# Patient Record
Sex: MALE | Race: WHITE | Employment: OTHER
[De-identification: names, ages, dates, MRNs, and addresses within clinical notes are randomized per-mention and may not be internally consistent; named-entity substitution may affect disease eponyms.]

---

## 2019-01-01 ENCOUNTER — HOME CARE VISIT (OUTPATIENT)
Dept: SCHEDULING | Facility: HOME HEALTH | Age: 69
End: 2019-01-01
Payer: MEDICARE

## 2019-01-01 ENCOUNTER — APPOINTMENT (OUTPATIENT)
Dept: GENERAL RADIOLOGY | Age: 69
DRG: 603 | End: 2019-01-01
Attending: INTERNAL MEDICINE
Payer: MEDICARE

## 2019-01-01 ENCOUNTER — HOME CARE VISIT (OUTPATIENT)
Dept: HOSPICE | Facility: HOSPICE | Age: 69
End: 2019-01-01
Payer: MEDICARE

## 2019-01-01 ENCOUNTER — HOSPITAL ENCOUNTER (INPATIENT)
Age: 69
End: 2019-01-01
Attending: INTERNAL MEDICINE | Admitting: INTERNAL MEDICINE
Payer: MEDICARE

## 2019-01-01 ENCOUNTER — APPOINTMENT (OUTPATIENT)
Dept: CT IMAGING | Age: 69
DRG: 603 | End: 2019-01-01
Attending: EMERGENCY MEDICINE
Payer: MEDICARE

## 2019-01-01 ENCOUNTER — APPOINTMENT (OUTPATIENT)
Dept: MRI IMAGING | Age: 69
DRG: 603 | End: 2019-01-01
Attending: INTERNAL MEDICINE
Payer: MEDICARE

## 2019-01-01 ENCOUNTER — APPOINTMENT (OUTPATIENT)
Dept: GENERAL RADIOLOGY | Age: 69
DRG: 603 | End: 2019-01-01
Attending: EMERGENCY MEDICINE
Payer: MEDICARE

## 2019-01-01 ENCOUNTER — HOSPICE ADMISSION (OUTPATIENT)
Dept: HOSPICE | Facility: HOSPICE | Age: 69
End: 2019-01-01
Payer: MEDICARE

## 2019-01-01 ENCOUNTER — HOSPITAL ENCOUNTER (INPATIENT)
Age: 69
LOS: 5 days | Discharge: HOSPICE | DRG: 603 | End: 2019-09-30
Attending: EMERGENCY MEDICINE | Admitting: INTERNAL MEDICINE
Payer: MEDICARE

## 2019-01-01 ENCOUNTER — HOSPITAL ENCOUNTER (INPATIENT)
Age: 69
LOS: 7 days | Discharge: HOME | End: 2019-10-07
Attending: INTERNAL MEDICINE | Admitting: INTERNAL MEDICINE
Payer: MEDICARE

## 2019-01-01 VITALS
SYSTOLIC BLOOD PRESSURE: 122 MMHG | HEART RATE: 72 BPM | RESPIRATION RATE: 22 BRPM | TEMPERATURE: 98.2 F | DIASTOLIC BLOOD PRESSURE: 72 MMHG

## 2019-01-01 VITALS
HEART RATE: 80 BPM | SYSTOLIC BLOOD PRESSURE: 160 MMHG | TEMPERATURE: 98.4 F | RESPIRATION RATE: 32 BRPM | DIASTOLIC BLOOD PRESSURE: 90 MMHG

## 2019-01-01 VITALS
TEMPERATURE: 98.4 F | HEART RATE: 88 BPM | DIASTOLIC BLOOD PRESSURE: 80 MMHG | RESPIRATION RATE: 20 BRPM | SYSTOLIC BLOOD PRESSURE: 128 MMHG

## 2019-01-01 VITALS
HEART RATE: 92 BPM | TEMPERATURE: 98.9 F | RESPIRATION RATE: 24 BRPM | SYSTOLIC BLOOD PRESSURE: 160 MMHG | DIASTOLIC BLOOD PRESSURE: 90 MMHG

## 2019-01-01 VITALS
DIASTOLIC BLOOD PRESSURE: 80 MMHG | TEMPERATURE: 98.6 F | SYSTOLIC BLOOD PRESSURE: 130 MMHG | HEART RATE: 72 BPM | RESPIRATION RATE: 22 BRPM

## 2019-01-01 VITALS
HEART RATE: 96 BPM | RESPIRATION RATE: 24 BRPM | SYSTOLIC BLOOD PRESSURE: 120 MMHG | DIASTOLIC BLOOD PRESSURE: 70 MMHG | TEMPERATURE: 98.6 F

## 2019-01-01 VITALS
HEART RATE: 72 BPM | DIASTOLIC BLOOD PRESSURE: 80 MMHG | SYSTOLIC BLOOD PRESSURE: 130 MMHG | TEMPERATURE: 97.6 F | RESPIRATION RATE: 24 BRPM

## 2019-01-01 VITALS
HEART RATE: 68 BPM | SYSTOLIC BLOOD PRESSURE: 138 MMHG | DIASTOLIC BLOOD PRESSURE: 80 MMHG | OXYGEN SATURATION: 98 % | RESPIRATION RATE: 20 BRPM | TEMPERATURE: 97.6 F

## 2019-01-01 VITALS
TEMPERATURE: 98.4 F | SYSTOLIC BLOOD PRESSURE: 136 MMHG | RESPIRATION RATE: 22 BRPM | DIASTOLIC BLOOD PRESSURE: 76 MMHG | HEART RATE: 92 BPM

## 2019-01-01 VITALS
HEART RATE: 84 BPM | DIASTOLIC BLOOD PRESSURE: 80 MMHG | TEMPERATURE: 98.1 F | SYSTOLIC BLOOD PRESSURE: 120 MMHG | RESPIRATION RATE: 20 BRPM

## 2019-01-01 VITALS
DIASTOLIC BLOOD PRESSURE: 80 MMHG | SYSTOLIC BLOOD PRESSURE: 122 MMHG | HEART RATE: 108 BPM | RESPIRATION RATE: 20 BRPM | TEMPERATURE: 98.6 F

## 2019-01-01 VITALS
RESPIRATION RATE: 18 BRPM | SYSTOLIC BLOOD PRESSURE: 135 MMHG | TEMPERATURE: 96.5 F | HEART RATE: 90 BPM | DIASTOLIC BLOOD PRESSURE: 74 MMHG

## 2019-01-01 VITALS
SYSTOLIC BLOOD PRESSURE: 150 MMHG | TEMPERATURE: 46.9 F | RESPIRATION RATE: 32 BRPM | DIASTOLIC BLOOD PRESSURE: 90 MMHG | HEART RATE: 92 BPM

## 2019-01-01 VITALS
RESPIRATION RATE: 28 BRPM | DIASTOLIC BLOOD PRESSURE: 80 MMHG | SYSTOLIC BLOOD PRESSURE: 140 MMHG | HEART RATE: 92 BPM | TEMPERATURE: 97.9 F

## 2019-01-01 VITALS
SYSTOLIC BLOOD PRESSURE: 145 MMHG | TEMPERATURE: 98.1 F | HEART RATE: 65 BPM | RESPIRATION RATE: 16 BRPM | DIASTOLIC BLOOD PRESSURE: 90 MMHG

## 2019-01-01 VITALS
DIASTOLIC BLOOD PRESSURE: 86 MMHG | RESPIRATION RATE: 28 BRPM | SYSTOLIC BLOOD PRESSURE: 140 MMHG | HEART RATE: 100 BPM | TEMPERATURE: 98 F

## 2019-01-01 VITALS
HEART RATE: 76 BPM | HEIGHT: 68 IN | BODY MASS INDEX: 24.71 KG/M2 | DIASTOLIC BLOOD PRESSURE: 76 MMHG | OXYGEN SATURATION: 98 % | WEIGHT: 163 LBS | TEMPERATURE: 97.6 F | RESPIRATION RATE: 16 BRPM | SYSTOLIC BLOOD PRESSURE: 116 MMHG

## 2019-01-01 VITALS
RESPIRATION RATE: 20 BRPM | BODY MASS INDEX: 24.25 KG/M2 | SYSTOLIC BLOOD PRESSURE: 141 MMHG | DIASTOLIC BLOOD PRESSURE: 80 MMHG | HEIGHT: 68 IN | TEMPERATURE: 96 F | WEIGHT: 160 LBS | OXYGEN SATURATION: 98 % | HEART RATE: 78 BPM

## 2019-01-01 VITALS
TEMPERATURE: 97.7 F | DIASTOLIC BLOOD PRESSURE: 76 MMHG | SYSTOLIC BLOOD PRESSURE: 138 MMHG | HEART RATE: 100 BPM | RESPIRATION RATE: 24 BRPM

## 2019-01-01 VITALS
SYSTOLIC BLOOD PRESSURE: 94 MMHG | HEART RATE: 90 BPM | RESPIRATION RATE: 26 BRPM | DIASTOLIC BLOOD PRESSURE: 64 MMHG | TEMPERATURE: 98.6 F

## 2019-01-01 VITALS
SYSTOLIC BLOOD PRESSURE: 110 MMHG | TEMPERATURE: 98.3 F | HEART RATE: 84 BPM | RESPIRATION RATE: 20 BRPM | DIASTOLIC BLOOD PRESSURE: 60 MMHG

## 2019-01-01 VITALS
TEMPERATURE: 98.5 F | DIASTOLIC BLOOD PRESSURE: 60 MMHG | RESPIRATION RATE: 20 BRPM | HEART RATE: 76 BPM | SYSTOLIC BLOOD PRESSURE: 80 MMHG

## 2019-01-01 VITALS
HEART RATE: 108 BPM | SYSTOLIC BLOOD PRESSURE: 140 MMHG | TEMPERATURE: 98.4 F | RESPIRATION RATE: 36 BRPM | DIASTOLIC BLOOD PRESSURE: 80 MMHG

## 2019-01-01 VITALS
TEMPERATURE: 98.6 F | RESPIRATION RATE: 28 BRPM | HEART RATE: 96 BPM | DIASTOLIC BLOOD PRESSURE: 80 MMHG | SYSTOLIC BLOOD PRESSURE: 140 MMHG

## 2019-01-01 VITALS
SYSTOLIC BLOOD PRESSURE: 120 MMHG | DIASTOLIC BLOOD PRESSURE: 70 MMHG | HEART RATE: 60 BPM | TEMPERATURE: 98.2 F | RESPIRATION RATE: 20 BRPM

## 2019-01-01 VITALS
SYSTOLIC BLOOD PRESSURE: 134 MMHG | DIASTOLIC BLOOD PRESSURE: 80 MMHG | TEMPERATURE: 98.4 F | HEART RATE: 84 BPM | RESPIRATION RATE: 32 BRPM

## 2019-01-01 VITALS
HEART RATE: 96 BPM | SYSTOLIC BLOOD PRESSURE: 155 MMHG | RESPIRATION RATE: 22 BRPM | DIASTOLIC BLOOD PRESSURE: 113 MMHG | TEMPERATURE: 98.9 F

## 2019-01-01 VITALS
TEMPERATURE: 98.2 F | SYSTOLIC BLOOD PRESSURE: 130 MMHG | HEART RATE: 86 BPM | DIASTOLIC BLOOD PRESSURE: 85 MMHG | RESPIRATION RATE: 20 BRPM

## 2019-01-01 VITALS
TEMPERATURE: 98.2 F | SYSTOLIC BLOOD PRESSURE: 110 MMHG | RESPIRATION RATE: 32 BRPM | DIASTOLIC BLOOD PRESSURE: 80 MMHG | HEART RATE: 92 BPM

## 2019-01-01 VITALS
SYSTOLIC BLOOD PRESSURE: 124 MMHG | DIASTOLIC BLOOD PRESSURE: 80 MMHG | HEART RATE: 84 BPM | TEMPERATURE: 98.2 F | RESPIRATION RATE: 26 BRPM

## 2019-01-01 DIAGNOSIS — L03.115 CELLULITIS OF RIGHT LEG: ICD-10-CM

## 2019-01-01 DIAGNOSIS — L03.119 CELLULITIS AND ABSCESS OF LEG, EXCEPT FOOT: Primary | ICD-10-CM

## 2019-01-01 DIAGNOSIS — N31.9 NEUROGENIC BLADDER DISORDER: ICD-10-CM

## 2019-01-01 DIAGNOSIS — R53.81 DEBILITY: ICD-10-CM

## 2019-01-01 DIAGNOSIS — F13.20 BENZODIAZEPINE DEPENDENCE (HCC): Primary | ICD-10-CM

## 2019-01-01 DIAGNOSIS — J96.22 ACUTE ON CHRONIC RESPIRATORY FAILURE WITH HYPERCAPNIA (HCC): ICD-10-CM

## 2019-01-01 DIAGNOSIS — J96.11 CHRONIC RESPIRATORY FAILURE WITH HYPOXIA (HCC): ICD-10-CM

## 2019-01-01 DIAGNOSIS — J43.1 PANLOBULAR EMPHYSEMA (HCC): ICD-10-CM

## 2019-01-01 DIAGNOSIS — L02.419 CELLULITIS AND ABSCESS OF LEG, EXCEPT FOOT: Primary | ICD-10-CM

## 2019-01-01 LAB
ACC. NO. FROM MICRO ORDER, ACCP: ABNORMAL
ACC. NO. FROM MICRO ORDER, ACCP: ABNORMAL
ALBUMIN SERPL-MCNC: 3 G/DL (ref 3.2–4.6)
ALBUMIN/GLOB SERPL: 0.6 {RATIO} (ref 1.2–3.5)
ALP SERPL-CCNC: 114 U/L (ref 50–136)
ALT SERPL-CCNC: 18 U/L (ref 12–65)
ANION GAP SERPL CALC-SCNC: 0 MMOL/L (ref 7–16)
ANION GAP SERPL CALC-SCNC: 2 MMOL/L (ref 7–16)
ANION GAP SERPL CALC-SCNC: 3 MMOL/L (ref 7–16)
ANION GAP SERPL CALC-SCNC: 3 MMOL/L (ref 7–16)
ANION GAP SERPL CALC-SCNC: 4 MMOL/L (ref 7–16)
ANION GAP SERPL CALC-SCNC: ABNORMAL MMOL/L (ref 7–16)
APPEARANCE UR: ABNORMAL
ARTERIAL PATENCY WRIST A: YES
AST SERPL-CCNC: 20 U/L (ref 15–37)
BACTERIA SPEC CULT: ABNORMAL
BACTERIA SPEC CULT: NORMAL
BACTERIA SPEC CULT: NORMAL
BACTERIA URNS QL MICRO: ABNORMAL /HPF
BASE EXCESS BLD CALC-SCNC: 7 MMOL/L
BASOPHILS # BLD: 0 K/UL (ref 0–0.2)
BASOPHILS NFR BLD: 0 % (ref 0–2)
BDY SITE: ABNORMAL
BILIRUB SERPL-MCNC: 0.4 MG/DL (ref 0.2–1.1)
BILIRUB UR QL: NEGATIVE
BODY TEMPERATURE: 98.6
BUN SERPL-MCNC: 15 MG/DL (ref 8–23)
BUN SERPL-MCNC: 17 MG/DL (ref 8–23)
BUN SERPL-MCNC: 18 MG/DL (ref 8–23)
BUN SERPL-MCNC: 23 MG/DL (ref 8–23)
BUN SERPL-MCNC: 24 MG/DL (ref 8–23)
BUN SERPL-MCNC: 25 MG/DL (ref 8–23)
CALCIUM SERPL-MCNC: 8.9 MG/DL (ref 8.3–10.4)
CALCIUM SERPL-MCNC: 8.9 MG/DL (ref 8.3–10.4)
CALCIUM SERPL-MCNC: 9.1 MG/DL (ref 8.3–10.4)
CALCIUM SERPL-MCNC: 9.1 MG/DL (ref 8.3–10.4)
CALCIUM SERPL-MCNC: 9.2 MG/DL (ref 8.3–10.4)
CALCIUM SERPL-MCNC: 9.6 MG/DL (ref 8.3–10.4)
CASTS URNS QL MICRO: ABNORMAL /LPF
CHLORIDE SERPL-SCNC: 101 MMOL/L (ref 98–107)
CHLORIDE SERPL-SCNC: 102 MMOL/L (ref 98–107)
CHLORIDE SERPL-SCNC: 105 MMOL/L (ref 98–107)
CHLORIDE SERPL-SCNC: 105 MMOL/L (ref 98–107)
CHLORIDE SERPL-SCNC: 107 MMOL/L (ref 98–107)
CHLORIDE SERPL-SCNC: 99 MMOL/L (ref 98–107)
CO2 BLD-SCNC: 37 MMOL/L
CO2 SERPL-SCNC: 35 MMOL/L (ref 21–32)
CO2 SERPL-SCNC: 36 MMOL/L (ref 21–32)
CO2 SERPL-SCNC: 37 MMOL/L (ref 21–32)
CO2 SERPL-SCNC: 38 MMOL/L (ref 21–32)
COLLECT TIME,HTIME: 1530
COLOR UR: YELLOW
CREAT SERPL-MCNC: 0.84 MG/DL (ref 0.8–1.5)
CREAT SERPL-MCNC: 0.84 MG/DL (ref 0.8–1.5)
CREAT SERPL-MCNC: 1 MG/DL (ref 0.8–1.5)
CREAT SERPL-MCNC: 1.02 MG/DL (ref 0.8–1.5)
CREAT SERPL-MCNC: 1.15 MG/DL (ref 0.8–1.5)
CREAT SERPL-MCNC: 1.17 MG/DL (ref 0.8–1.5)
DIFFERENTIAL METHOD BLD: ABNORMAL
EOSINOPHIL # BLD: 0 K/UL (ref 0–0.8)
EOSINOPHIL # BLD: 0.1 K/UL (ref 0–0.8)
EOSINOPHIL NFR BLD: 0 % (ref 0.5–7.8)
EOSINOPHIL NFR BLD: 1 % (ref 0.5–7.8)
EPI CELLS #/AREA URNS HPF: 0 /HPF
ERYTHROCYTE [DISTWIDTH] IN BLOOD BY AUTOMATED COUNT: 13.8 % (ref 11.9–14.6)
ERYTHROCYTE [DISTWIDTH] IN BLOOD BY AUTOMATED COUNT: 13.9 % (ref 11.9–14.6)
ERYTHROCYTE [DISTWIDTH] IN BLOOD BY AUTOMATED COUNT: 14 % (ref 11.9–14.6)
ERYTHROCYTE [DISTWIDTH] IN BLOOD BY AUTOMATED COUNT: 14.1 % (ref 11.9–14.6)
ERYTHROCYTE [DISTWIDTH] IN BLOOD BY AUTOMATED COUNT: 14.3 % (ref 11.9–14.6)
ERYTHROCYTE [DISTWIDTH] IN BLOOD BY AUTOMATED COUNT: 14.3 % (ref 11.9–14.6)
ESCHERICHIA COLI: DETECTED
ESCHERICHIA COLI: DETECTED
FLOW RATE ISTAT,IFRATE: 3.5 L/MIN
GAS FLOW.O2 O2 DELIVERY SYS: ABNORMAL L/MIN
GLOBULIN SER CALC-MCNC: 4.9 G/DL (ref 2.3–3.5)
GLUCOSE SERPL-MCNC: 113 MG/DL (ref 65–100)
GLUCOSE SERPL-MCNC: 132 MG/DL (ref 65–100)
GLUCOSE SERPL-MCNC: 132 MG/DL (ref 65–100)
GLUCOSE SERPL-MCNC: 141 MG/DL (ref 65–100)
GLUCOSE SERPL-MCNC: 88 MG/DL (ref 65–100)
GLUCOSE SERPL-MCNC: 95 MG/DL (ref 65–100)
GLUCOSE UR STRIP.AUTO-MCNC: NEGATIVE MG/DL
GRAM STN SPEC: ABNORMAL
HCO3 BLD-SCNC: 35.1 MMOL/L (ref 22–26)
HCT VFR BLD AUTO: 36.3 % (ref 41.1–50.3)
HCT VFR BLD AUTO: 38.8 % (ref 41.1–50.3)
HCT VFR BLD AUTO: 39.1 % (ref 41.1–50.3)
HCT VFR BLD AUTO: 39.7 % (ref 41.1–50.3)
HCT VFR BLD AUTO: 40.1 % (ref 41.1–50.3)
HCT VFR BLD AUTO: 44.2 % (ref 41.1–50.3)
HGB BLD-MCNC: 10.7 G/DL (ref 13.6–17.2)
HGB BLD-MCNC: 11.4 G/DL (ref 13.6–17.2)
HGB BLD-MCNC: 11.5 G/DL (ref 13.6–17.2)
HGB BLD-MCNC: 13.3 G/DL (ref 13.6–17.2)
HGB UR QL STRIP: ABNORMAL
IMM GRANULOCYTES # BLD AUTO: 0.1 K/UL (ref 0–0.5)
IMM GRANULOCYTES # BLD AUTO: 0.2 K/UL (ref 0–0.5)
IMM GRANULOCYTES # BLD AUTO: 0.2 K/UL (ref 0–0.5)
IMM GRANULOCYTES NFR BLD AUTO: 1 % (ref 0–5)
INTERPRETATION: ABNORMAL
INTERPRETATION: ABNORMAL
KETONES UR QL STRIP.AUTO: NEGATIVE MG/DL
KPC (CARBAPENEM RESISTANCE GENE): NOT DETECTED
KPC (CARBAPENEM RESISTANCE GENE): NOT DETECTED
LACTATE BLD-SCNC: 0.99 MMOL/L (ref 0.5–1.9)
LEUKOCYTE ESTERASE UR QL STRIP.AUTO: ABNORMAL
LYMPHOCYTES # BLD: 0.3 K/UL (ref 0.5–4.6)
LYMPHOCYTES # BLD: 0.6 K/UL (ref 0.5–4.6)
LYMPHOCYTES # BLD: 0.7 K/UL (ref 0.5–4.6)
LYMPHOCYTES # BLD: 0.7 K/UL (ref 0.5–4.6)
LYMPHOCYTES # BLD: 0.8 K/UL (ref 0.5–4.6)
LYMPHOCYTES # BLD: 0.9 K/UL (ref 0.5–4.6)
LYMPHOCYTES NFR BLD: 2 % (ref 13–44)
LYMPHOCYTES NFR BLD: 4 % (ref 13–44)
LYMPHOCYTES NFR BLD: 5 % (ref 13–44)
LYMPHOCYTES NFR BLD: 5 % (ref 13–44)
LYMPHOCYTES NFR BLD: 7 % (ref 13–44)
LYMPHOCYTES NFR BLD: 9 % (ref 13–44)
MCH RBC QN AUTO: 28.2 PG (ref 26.1–32.9)
MCH RBC QN AUTO: 28.4 PG (ref 26.1–32.9)
MCH RBC QN AUTO: 28.5 PG (ref 26.1–32.9)
MCH RBC QN AUTO: 28.6 PG (ref 26.1–32.9)
MCH RBC QN AUTO: 28.6 PG (ref 26.1–32.9)
MCH RBC QN AUTO: 28.8 PG (ref 26.1–32.9)
MCHC RBC AUTO-ENTMCNC: 28.7 G/DL (ref 31.4–35)
MCHC RBC AUTO-ENTMCNC: 29 G/DL (ref 31.4–35)
MCHC RBC AUTO-ENTMCNC: 29.2 G/DL (ref 31.4–35)
MCHC RBC AUTO-ENTMCNC: 29.5 G/DL (ref 31.4–35)
MCHC RBC AUTO-ENTMCNC: 29.6 G/DL (ref 31.4–35)
MCHC RBC AUTO-ENTMCNC: 30.1 G/DL (ref 31.4–35)
MCV RBC AUTO: 95.1 FL (ref 79.6–97.8)
MCV RBC AUTO: 96.5 FL (ref 79.6–97.8)
MCV RBC AUTO: 96.8 FL (ref 79.6–97.8)
MCV RBC AUTO: 97.5 FL (ref 79.6–97.8)
MCV RBC AUTO: 98.3 FL (ref 79.6–97.8)
MCV RBC AUTO: 99.3 FL (ref 79.6–97.8)
MM INDURATION POC: 0 MM (ref 0–5)
MONOCYTES # BLD: 0.6 K/UL (ref 0.1–1.3)
MONOCYTES # BLD: 0.7 K/UL (ref 0.1–1.3)
MONOCYTES # BLD: 0.8 K/UL (ref 0.1–1.3)
MONOCYTES # BLD: 0.9 K/UL (ref 0.1–1.3)
MONOCYTES # BLD: 0.9 K/UL (ref 0.1–1.3)
MONOCYTES # BLD: 1.2 K/UL (ref 0.1–1.3)
MONOCYTES NFR BLD: 4 % (ref 4–12)
MONOCYTES NFR BLD: 5 % (ref 4–12)
MONOCYTES NFR BLD: 6 % (ref 4–12)
MONOCYTES NFR BLD: 7 % (ref 4–12)
MONOCYTES NFR BLD: 7 % (ref 4–12)
MONOCYTES NFR BLD: 8 % (ref 4–12)
NEUTS SEG # BLD: 10.4 K/UL (ref 1.7–8.2)
NEUTS SEG # BLD: 11.7 K/UL (ref 1.7–8.2)
NEUTS SEG # BLD: 11.9 K/UL (ref 1.7–8.2)
NEUTS SEG # BLD: 13.4 K/UL (ref 1.7–8.2)
NEUTS SEG # BLD: 17 K/UL (ref 1.7–8.2)
NEUTS SEG # BLD: 8.1 K/UL (ref 1.7–8.2)
NEUTS SEG NFR BLD: 83 % (ref 43–78)
NEUTS SEG NFR BLD: 85 % (ref 43–78)
NEUTS SEG NFR BLD: 87 % (ref 43–78)
NEUTS SEG NFR BLD: 87 % (ref 43–78)
NEUTS SEG NFR BLD: 88 % (ref 43–78)
NEUTS SEG NFR BLD: 93 % (ref 43–78)
NITRITE UR QL STRIP.AUTO: POSITIVE
NRBC # BLD: 0 K/UL (ref 0–0.2)
PCO2 BLD: 64.3 MMHG (ref 35–45)
PH BLD: 7.34 [PH] (ref 7.35–7.45)
PH UR STRIP: 5 [PH] (ref 5–9)
PLATELET # BLD AUTO: 273 K/UL (ref 150–450)
PLATELET # BLD AUTO: 291 K/UL (ref 150–450)
PLATELET # BLD AUTO: 302 K/UL (ref 150–450)
PLATELET # BLD AUTO: 314 K/UL (ref 150–450)
PLATELET # BLD AUTO: 330 K/UL (ref 150–450)
PLATELET # BLD AUTO: 374 K/UL (ref 150–450)
PMV BLD AUTO: 10 FL (ref 9.4–12.3)
PMV BLD AUTO: 9.9 FL (ref 9.4–12.3)
PMV BLD AUTO: 9.9 FL (ref 9.4–12.3)
PO2 BLD: 98 MMHG (ref 75–100)
POTASSIUM SERPL-SCNC: 3.5 MMOL/L (ref 3.5–5.1)
POTASSIUM SERPL-SCNC: 3.8 MMOL/L (ref 3.5–5.1)
POTASSIUM SERPL-SCNC: 4 MMOL/L (ref 3.5–5.1)
POTASSIUM SERPL-SCNC: 4 MMOL/L (ref 3.5–5.1)
POTASSIUM SERPL-SCNC: 4.1 MMOL/L (ref 3.5–5.1)
POTASSIUM SERPL-SCNC: 4.4 MMOL/L (ref 3.5–5.1)
PPD POC: NEGATIVE NEGATIVE
PROT SERPL-MCNC: 7.9 G/DL (ref 6.3–8.2)
PROT UR STRIP-MCNC: ABNORMAL MG/DL
RBC # BLD AUTO: 3.75 M/UL (ref 4.23–5.6)
RBC # BLD AUTO: 4 M/UL (ref 4.23–5.6)
RBC # BLD AUTO: 4.01 M/UL (ref 4.23–5.6)
RBC # BLD AUTO: 4.02 M/UL (ref 4.23–5.6)
RBC # BLD AUTO: 4.08 M/UL (ref 4.23–5.6)
RBC # BLD AUTO: 4.65 M/UL (ref 4.23–5.6)
RBC #/AREA URNS HPF: ABNORMAL /HPF
SAO2 % BLD: 97 % (ref 95–98)
SERVICE CMNT-IMP: ABNORMAL
SERVICE CMNT-IMP: NORMAL
SERVICE CMNT-IMP: NORMAL
SODIUM SERPL-SCNC: 137 MMOL/L (ref 136–145)
SODIUM SERPL-SCNC: 139 MMOL/L (ref 136–145)
SODIUM SERPL-SCNC: 139 MMOL/L (ref 136–145)
SODIUM SERPL-SCNC: 141 MMOL/L (ref 136–145)
SODIUM SERPL-SCNC: 145 MMOL/L (ref 136–145)
SODIUM SERPL-SCNC: 147 MMOL/L (ref 136–145)
SP GR UR REFRACTOMETRY: 1.03 (ref 1–1.02)
SPECIMEN TYPE: ABNORMAL
UROBILINOGEN UR QL STRIP.AUTO: 0.2 EU/DL (ref 0.2–1)
VANCOMYCIN TROUGH SERPL-MCNC: 12.8 UG/ML (ref 5–20)
VANCOMYCIN TROUGH SERPL-MCNC: 16.4 UG/ML (ref 5–20)
WBC # BLD AUTO: 12.3 K/UL (ref 4.3–11.1)
WBC # BLD AUTO: 13.3 K/UL (ref 4.3–11.1)
WBC # BLD AUTO: 13.7 K/UL (ref 4.3–11.1)
WBC # BLD AUTO: 15.3 K/UL (ref 4.3–11.1)
WBC # BLD AUTO: 18.2 K/UL (ref 4.3–11.1)
WBC # BLD AUTO: 9.7 K/UL (ref 4.3–11.1)
WBC URNS QL MICRO: ABNORMAL /HPF

## 2019-01-01 PROCEDURE — 74011000250 HC RX REV CODE- 250: Performed by: NURSE PRACTITIONER

## 2019-01-01 PROCEDURE — G0156 HHCP-SVS OF AIDE,EA 15 MIN: HCPCS

## 2019-01-01 PROCEDURE — 77010033678 HC OXYGEN DAILY

## 2019-01-01 PROCEDURE — 74011636637 HC RX REV CODE- 636/637: Performed by: INTERNAL MEDICINE

## 2019-01-01 PROCEDURE — 65270000029 HC RM PRIVATE

## 2019-01-01 PROCEDURE — 0651 HSPC ROUTINE HOME CARE

## 2019-01-01 PROCEDURE — HOSPICE MEDICATION HC HH HOSPICE MEDICATION

## 2019-01-01 PROCEDURE — G0299 HHS/HOSPICE OF RN EA 15 MIN: HCPCS

## 2019-01-01 PROCEDURE — 85025 COMPLETE CBC W/AUTO DIFF WBC: CPT

## 2019-01-01 PROCEDURE — T4527 ADULT SIZE PULL-ON LG: HCPCS

## 2019-01-01 PROCEDURE — 74011250637 HC RX REV CODE- 250/637: Performed by: FAMILY MEDICINE

## 2019-01-01 PROCEDURE — 36415 COLL VENOUS BLD VENIPUNCTURE: CPT

## 2019-01-01 PROCEDURE — 74011250636 HC RX REV CODE- 250/636: Performed by: INTERNAL MEDICINE

## 2019-01-01 PROCEDURE — 77010033711 HC HIGH FLOW OXYGEN

## 2019-01-01 PROCEDURE — 80048 BASIC METABOLIC PNL TOTAL CA: CPT

## 2019-01-01 PROCEDURE — 74011000258 HC RX REV CODE- 258: Performed by: INTERNAL MEDICINE

## 2019-01-01 PROCEDURE — 74011250637 HC RX REV CODE- 250/637: Performed by: INTERNAL MEDICINE

## 2019-01-01 PROCEDURE — 3336590001 HSPC ROOM AND BOARD

## 2019-01-01 PROCEDURE — 87150 DNA/RNA AMPLIFIED PROBE: CPT

## 2019-01-01 PROCEDURE — 83605 ASSAY OF LACTIC ACID: CPT

## 2019-01-01 PROCEDURE — 74011250637 HC RX REV CODE- 250/637: Performed by: NURSE PRACTITIONER

## 2019-01-01 PROCEDURE — 74011000250 HC RX REV CODE- 250: Performed by: INTERNAL MEDICINE

## 2019-01-01 PROCEDURE — 74011250636 HC RX REV CODE- 250/636: Performed by: NURSE PRACTITIONER

## 2019-01-01 PROCEDURE — 87086 URINE CULTURE/COLONY COUNT: CPT

## 2019-01-01 PROCEDURE — 0656 HSPC GENERAL INPATIENT

## 2019-01-01 PROCEDURE — 77030011256 HC DRSG MEPILEX <16IN NO BORD MOLN -A

## 2019-01-01 PROCEDURE — 80202 ASSAY OF VANCOMYCIN: CPT

## 2019-01-01 PROCEDURE — G0155 HHCP-SVS OF CSW,EA 15 MIN: HCPCS

## 2019-01-01 PROCEDURE — A6446 CONFORM BAND S W>=3" <5"/YD: HCPCS

## 2019-01-01 PROCEDURE — 80053 COMPREHEN METABOLIC PANEL: CPT

## 2019-01-01 PROCEDURE — 94760 N-INVAS EAR/PLS OXIMETRY 1: CPT

## 2019-01-01 PROCEDURE — 74011636637 HC RX REV CODE- 636/637: Performed by: NURSE PRACTITIONER

## 2019-01-01 PROCEDURE — 3336500001 HSPC ELECTION

## 2019-01-01 PROCEDURE — 87088 URINE BACTERIA CULTURE: CPT

## 2019-01-01 PROCEDURE — A4358 URINARY LEG OR ABDOMEN BAG: HCPCS

## 2019-01-01 PROCEDURE — 96365 THER/PROPH/DIAG IV INF INIT: CPT | Performed by: EMERGENCY MEDICINE

## 2019-01-01 PROCEDURE — 94640 AIRWAY INHALATION TREATMENT: CPT

## 2019-01-01 PROCEDURE — 36600 WITHDRAWAL OF ARTERIAL BLOOD: CPT

## 2019-01-01 PROCEDURE — T4523 ADULT SIZE BRIEF/DIAPER LG: HCPCS

## 2019-01-01 PROCEDURE — A9575 INJ GADOTERATE MEGLUMI 0.1ML: HCPCS | Performed by: INTERNAL MEDICINE

## 2019-01-01 PROCEDURE — 87040 BLOOD CULTURE FOR BACTERIA: CPT

## 2019-01-01 PROCEDURE — T4541 LARGE DISPOSABLE UNDERPAD: HCPCS

## 2019-01-01 PROCEDURE — 74011000258 HC RX REV CODE- 258: Performed by: EMERGENCY MEDICINE

## 2019-01-01 PROCEDURE — A6260 WOUND CLEANSER ANY TYPE/SIZE: HCPCS

## 2019-01-01 PROCEDURE — 94664 DEMO&/EVAL PT USE INHALER: CPT

## 2019-01-01 PROCEDURE — 74011250636 HC RX REV CODE- 250/636: Performed by: EMERGENCY MEDICINE

## 2019-01-01 PROCEDURE — A6216 NON-STERILE GAUZE<=16 SQ IN: HCPCS

## 2019-01-01 PROCEDURE — A9270 NON-COVERED ITEM OR SERVICE: HCPCS

## 2019-01-01 PROCEDURE — 99285 EMERGENCY DEPT VISIT HI MDM: CPT | Performed by: EMERGENCY MEDICINE

## 2019-01-01 PROCEDURE — 74011250636 HC RX REV CODE- 250/636: Performed by: FAMILY MEDICINE

## 2019-01-01 PROCEDURE — 73723 MRI JOINT LWR EXTR W/O&W/DYE: CPT

## 2019-01-01 PROCEDURE — 3331090004 HSPC SERVICE INTENSITY ADD-ON

## 2019-01-01 PROCEDURE — 77030013140 HC MSK NEB VYRM -A

## 2019-01-01 PROCEDURE — 87077 CULTURE AEROBIC IDENTIFY: CPT

## 2019-01-01 PROCEDURE — 86580 TB INTRADERMAL TEST: CPT | Performed by: INTERNAL MEDICINE

## 2019-01-01 PROCEDURE — A4357 BEDSIDE DRAINAGE BAG: HCPCS

## 2019-01-01 PROCEDURE — 71250 CT THORAX DX C-: CPT

## 2019-01-01 PROCEDURE — 81001 URINALYSIS AUTO W/SCOPE: CPT

## 2019-01-01 PROCEDURE — 87205 SMEAR GRAM STAIN: CPT

## 2019-01-01 PROCEDURE — 77030021668 HC NEB PREFIL KT VYRM -A

## 2019-01-01 PROCEDURE — 71045 X-RAY EXAM CHEST 1 VIEW: CPT

## 2019-01-01 PROCEDURE — 74011000302 HC RX REV CODE- 302: Performed by: INTERNAL MEDICINE

## 2019-01-01 PROCEDURE — 82803 BLOOD GASES ANY COMBINATION: CPT

## 2019-01-01 PROCEDURE — 96367 TX/PROPH/DG ADDL SEQ IV INF: CPT | Performed by: EMERGENCY MEDICINE

## 2019-01-01 PROCEDURE — 87186 SC STD MICRODIL/AGAR DIL: CPT

## 2019-01-01 RX ORDER — GLYCOPYRROLATE 0.2 MG/ML
0.2 INJECTION INTRAMUSCULAR; INTRAVENOUS
Status: DISCONTINUED | OUTPATIENT
Start: 2019-01-01 | End: 2019-01-01

## 2019-01-01 RX ORDER — CEPHALEXIN 500 MG/1
500 CAPSULE ORAL EVERY 6 HOURS
Status: DISCONTINUED | OUTPATIENT
Start: 2019-01-01 | End: 2019-01-01 | Stop reason: HOSPADM

## 2019-01-01 RX ORDER — SERTRALINE HYDROCHLORIDE 50 MG/1
100 TABLET, FILM COATED ORAL DAILY
Status: DISCONTINUED | OUTPATIENT
Start: 2019-01-01 | End: 2019-01-01 | Stop reason: HOSPADM

## 2019-01-01 RX ORDER — CLONAZEPAM 1 MG/1
1 TABLET ORAL 3 TIMES DAILY
Status: DISCONTINUED | OUTPATIENT
Start: 2019-01-01 | End: 2019-01-01 | Stop reason: HOSPADM

## 2019-01-01 RX ORDER — LORAZEPAM 2 MG/ML
1 INJECTION INTRAMUSCULAR EVERY 8 HOURS
Status: DISCONTINUED | OUTPATIENT
Start: 2019-01-01 | End: 2019-01-01

## 2019-01-01 RX ORDER — LORAZEPAM 1 MG/1
1 TABLET ORAL ONCE
Status: COMPLETED | OUTPATIENT
Start: 2019-01-01 | End: 2019-01-01

## 2019-01-01 RX ORDER — MORPHINE SULFATE 2 MG/ML
0.5 INJECTION, SOLUTION INTRAMUSCULAR; INTRAVENOUS ONCE
Status: COMPLETED | OUTPATIENT
Start: 2019-01-01 | End: 2019-01-01

## 2019-01-01 RX ORDER — HYDRALAZINE HYDROCHLORIDE 20 MG/ML
10 INJECTION INTRAMUSCULAR; INTRAVENOUS
Status: DISCONTINUED | OUTPATIENT
Start: 2019-01-01 | End: 2019-01-01 | Stop reason: HOSPADM

## 2019-01-01 RX ORDER — PREDNISONE 10 MG/1
10 TABLET ORAL
Status: DISCONTINUED | OUTPATIENT
Start: 2019-01-01 | End: 2019-01-01 | Stop reason: HOSPADM

## 2019-01-01 RX ORDER — MORPHINE SULFATE 20 MG/ML
10 SOLUTION ORAL
Qty: 30 ML | Refills: 0 | Status: SHIPPED | OUTPATIENT
Start: 2019-01-01 | End: 2019-01-01

## 2019-01-01 RX ORDER — MORPHINE SULFATE 2 MG/ML
0.5 INJECTION, SOLUTION INTRAMUSCULAR; INTRAVENOUS
Status: DISCONTINUED | OUTPATIENT
Start: 2019-01-01 | End: 2019-01-01

## 2019-01-01 RX ORDER — FACIAL-BODY WIPES
10 EACH TOPICAL AS NEEDED
Status: DISCONTINUED | OUTPATIENT
Start: 2019-01-01 | End: 2019-01-01 | Stop reason: HOSPADM

## 2019-01-01 RX ORDER — SODIUM CHLORIDE 0.9 % (FLUSH) 0.9 %
10 SYRINGE (ML) INJECTION
Status: COMPLETED | OUTPATIENT
Start: 2019-01-01 | End: 2019-01-01

## 2019-01-01 RX ORDER — ACETAMINOPHEN 325 MG/1
650 TABLET ORAL
Status: DISCONTINUED | OUTPATIENT
Start: 2019-01-01 | End: 2019-01-01 | Stop reason: HOSPADM

## 2019-01-01 RX ORDER — PRAMIPEXOLE DIHYDROCHLORIDE 1 MG/1
1 TABLET ORAL
Status: DISCONTINUED | OUTPATIENT
Start: 2019-01-01 | End: 2019-01-01 | Stop reason: HOSPADM

## 2019-01-01 RX ORDER — SULFAMETHOXAZOLE AND TRIMETHOPRIM 800; 160 MG/1; MG/1
1 TABLET ORAL EVERY 12 HOURS
Status: DISCONTINUED | OUTPATIENT
Start: 2019-01-01 | End: 2019-01-01 | Stop reason: HOSPADM

## 2019-01-01 RX ORDER — DOXYCYCLINE 100 MG/1
100 CAPSULE ORAL EVERY 12 HOURS
Status: DISCONTINUED | OUTPATIENT
Start: 2019-01-01 | End: 2019-01-01 | Stop reason: HOSPADM

## 2019-01-01 RX ORDER — SAME BUTANEDISULFONATE/BETAINE 400-600 MG
500 POWDER IN PACKET (EA) ORAL DAILY
Qty: 14 CAP | Refills: 0 | Status: SHIPPED | OUTPATIENT
Start: 2019-01-01 | End: 2019-01-01

## 2019-01-01 RX ORDER — SENNOSIDES 8.6 MG/1
1 TABLET ORAL DAILY
Status: DISCONTINUED | OUTPATIENT
Start: 2019-01-01 | End: 2019-01-01 | Stop reason: HOSPADM

## 2019-01-01 RX ORDER — SULFAMETHOXAZOLE AND TRIMETHOPRIM 800; 160 MG/1; MG/1
1 TABLET ORAL EVERY 12 HOURS
Qty: 12 TAB | Refills: 0 | Status: ON HOLD | OUTPATIENT
Start: 2019-01-01 | End: 2019-01-01

## 2019-01-01 RX ORDER — SENNOSIDES 8.6 MG/1
1 TABLET ORAL 2 TIMES DAILY
Status: DISCONTINUED | OUTPATIENT
Start: 2019-01-01 | End: 2019-01-01 | Stop reason: HOSPADM

## 2019-01-01 RX ORDER — IPRATROPIUM BROMIDE AND ALBUTEROL SULFATE 2.5; .5 MG/3ML; MG/3ML
3 SOLUTION RESPIRATORY (INHALATION)
Status: DISCONTINUED | OUTPATIENT
Start: 2019-01-01 | End: 2019-01-01 | Stop reason: HOSPADM

## 2019-01-01 RX ORDER — SODIUM CHLORIDE 0.9 % (FLUSH) 0.9 %
5-40 SYRINGE (ML) INJECTION EVERY 8 HOURS
Status: DISCONTINUED | OUTPATIENT
Start: 2019-01-01 | End: 2019-01-01 | Stop reason: HOSPADM

## 2019-01-01 RX ORDER — BENZONATATE 100 MG/1
200 CAPSULE ORAL 3 TIMES DAILY
Status: DISCONTINUED | OUTPATIENT
Start: 2019-01-01 | End: 2019-01-01 | Stop reason: HOSPADM

## 2019-01-01 RX ORDER — MORPHINE SULFATE 100 MG/5ML
5 SOLUTION ORAL
Status: DISCONTINUED | OUTPATIENT
Start: 2019-01-01 | End: 2019-01-01 | Stop reason: HOSPADM

## 2019-01-01 RX ORDER — SAME BUTANEDISULFONATE/BETAINE 400-600 MG
500 POWDER IN PACKET (EA) ORAL DAILY
Status: DISCONTINUED | OUTPATIENT
Start: 2019-01-01 | End: 2019-01-01 | Stop reason: HOSPADM

## 2019-01-01 RX ORDER — VANCOMYCIN 1.75 GRAM/500 ML IN 0.9 % SODIUM CHLORIDE INTRAVENOUS
1750
Status: COMPLETED | OUTPATIENT
Start: 2019-01-01 | End: 2019-01-01

## 2019-01-01 RX ORDER — HEPARIN SODIUM 5000 [USP'U]/ML
5000 INJECTION, SOLUTION INTRAVENOUS; SUBCUTANEOUS EVERY 8 HOURS
Status: DISCONTINUED | OUTPATIENT
Start: 2019-01-01 | End: 2019-01-01 | Stop reason: HOSPADM

## 2019-01-01 RX ORDER — LORAZEPAM 2 MG/ML
1 INJECTION INTRAMUSCULAR ONCE
Status: COMPLETED | OUTPATIENT
Start: 2019-01-01 | End: 2019-01-01

## 2019-01-01 RX ORDER — SENNOSIDES 8.6 MG/1
1 TABLET ORAL DAILY
Status: DISCONTINUED | OUTPATIENT
Start: 2019-01-01 | End: 2019-01-01

## 2019-01-01 RX ORDER — LORAZEPAM 2 MG/ML
1 INJECTION INTRAMUSCULAR
Status: DISCONTINUED | OUTPATIENT
Start: 2019-01-01 | End: 2019-01-01

## 2019-01-01 RX ORDER — FAMOTIDINE 20 MG/1
20 TABLET, FILM COATED ORAL 2 TIMES DAILY
Status: DISCONTINUED | OUTPATIENT
Start: 2019-01-01 | End: 2019-01-01

## 2019-01-01 RX ORDER — ACETAMINOPHEN 650 MG/1
650 SUPPOSITORY RECTAL
Status: DISCONTINUED | OUTPATIENT
Start: 2019-01-01 | End: 2019-01-01 | Stop reason: HOSPADM

## 2019-01-01 RX ORDER — MORPHINE SULFATE 2 MG/ML
1 INJECTION, SOLUTION INTRAMUSCULAR; INTRAVENOUS ONCE
Status: COMPLETED | OUTPATIENT
Start: 2019-01-01 | End: 2019-01-01

## 2019-01-01 RX ORDER — PANTOPRAZOLE SODIUM 40 MG/1
40 TABLET, DELAYED RELEASE ORAL
Status: DISCONTINUED | OUTPATIENT
Start: 2019-01-01 | End: 2019-01-01 | Stop reason: HOSPADM

## 2019-01-01 RX ORDER — SENNOSIDES 8.6 MG/1
1 TABLET ORAL 2 TIMES DAILY
Status: DISCONTINUED | OUTPATIENT
Start: 2019-01-01 | End: 2019-01-01

## 2019-01-01 RX ORDER — BENZONATATE 200 MG/1
200 CAPSULE ORAL 3 TIMES DAILY
Status: ON HOLD | COMMUNITY
End: 2019-01-01

## 2019-01-01 RX ORDER — FLUTICASONE PROPIONATE AND SALMETEROL 250; 50 UG/1; UG/1
1 POWDER RESPIRATORY (INHALATION) 2 TIMES DAILY
Status: DISCONTINUED | OUTPATIENT
Start: 2019-01-01 | End: 2019-01-01 | Stop reason: HOSPADM

## 2019-01-01 RX ORDER — MORPHINE SULFATE 2 MG/ML
2 INJECTION, SOLUTION INTRAMUSCULAR; INTRAVENOUS
Status: DISCONTINUED | OUTPATIENT
Start: 2019-01-01 | End: 2019-01-01 | Stop reason: HOSPADM

## 2019-01-01 RX ORDER — GUAIFENESIN/DEXTROMETHORPHAN 100-10MG/5
10 SYRUP ORAL
Qty: 1 BOTTLE | Refills: 0 | Status: SHIPPED | OUTPATIENT
Start: 2019-01-01 | End: 2019-01-01

## 2019-01-01 RX ORDER — TRAZODONE HYDROCHLORIDE 50 MG/1
150 TABLET ORAL
Status: DISCONTINUED | OUTPATIENT
Start: 2019-01-01 | End: 2019-01-01 | Stop reason: HOSPADM

## 2019-01-01 RX ORDER — GUAIFENESIN/DEXTROMETHORPHAN 100-10MG/5
10 SYRUP ORAL
Status: DISCONTINUED | OUTPATIENT
Start: 2019-01-01 | End: 2019-01-01 | Stop reason: HOSPADM

## 2019-01-01 RX ORDER — MORPHINE SULFATE 2 MG/ML
2 INJECTION, SOLUTION INTRAMUSCULAR; INTRAVENOUS
Status: DISCONTINUED | OUTPATIENT
Start: 2019-01-01 | End: 2019-01-01

## 2019-01-01 RX ORDER — BUDESONIDE 0.5 MG/2ML
500 INHALANT ORAL
Status: DISCONTINUED | OUTPATIENT
Start: 2019-01-01 | End: 2019-01-01 | Stop reason: HOSPADM

## 2019-01-01 RX ORDER — PREDNISONE 10 MG/1
20 TABLET ORAL DAILY
Status: DISCONTINUED | OUTPATIENT
Start: 2019-01-01 | End: 2019-01-01 | Stop reason: HOSPADM

## 2019-01-01 RX ORDER — IPRATROPIUM BROMIDE AND ALBUTEROL SULFATE 2.5; .5 MG/3ML; MG/3ML
3 SOLUTION RESPIRATORY (INHALATION)
Qty: 30 NEBULE | Refills: 0 | Status: SHIPPED | OUTPATIENT
Start: 2019-01-01 | End: 2019-11-06

## 2019-01-01 RX ORDER — FAMOTIDINE 20 MG/1
20 TABLET, FILM COATED ORAL DAILY
Status: DISCONTINUED | OUTPATIENT
Start: 2019-01-01 | End: 2019-01-01 | Stop reason: HOSPADM

## 2019-01-01 RX ORDER — SODIUM CHLORIDE 0.9 % (FLUSH) 0.9 %
3 SYRINGE (ML) INJECTION EVERY 12 HOURS
Status: DISCONTINUED | OUTPATIENT
Start: 2019-01-01 | End: 2019-01-01

## 2019-01-01 RX ORDER — CEPHALEXIN 500 MG/1
500 CAPSULE ORAL EVERY 6 HOURS
Qty: 24 CAP | Refills: 0 | Status: ON HOLD | OUTPATIENT
Start: 2019-01-01 | End: 2019-01-01

## 2019-01-01 RX ORDER — IPRATROPIUM BROMIDE AND ALBUTEROL SULFATE 2.5; .5 MG/3ML; MG/3ML
3 SOLUTION RESPIRATORY (INHALATION) EVERY 6 HOURS
Qty: 30 NEBULE | Refills: 0 | Status: SHIPPED | OUTPATIENT
Start: 2019-01-01

## 2019-01-01 RX ORDER — SODIUM CHLORIDE 0.9 % (FLUSH) 0.9 %
3 SYRINGE (ML) INJECTION AS NEEDED
Status: DISCONTINUED | OUTPATIENT
Start: 2019-01-01 | End: 2019-01-01

## 2019-01-01 RX ORDER — SAME BUTANEDISULFONATE/BETAINE 400-600 MG
250 POWDER IN PACKET (EA) ORAL 2 TIMES DAILY
Status: DISCONTINUED | OUTPATIENT
Start: 2019-01-01 | End: 2019-01-01 | Stop reason: HOSPADM

## 2019-01-01 RX ORDER — LORAZEPAM 1 MG/1
1 TABLET ORAL
Status: DISCONTINUED | OUTPATIENT
Start: 2019-01-01 | End: 2019-01-01 | Stop reason: HOSPADM

## 2019-01-01 RX ORDER — LORAZEPAM 2 MG/ML
0.5 INJECTION INTRAMUSCULAR
Status: DISCONTINUED | OUTPATIENT
Start: 2019-01-01 | End: 2019-01-01 | Stop reason: HOSPADM

## 2019-01-01 RX ORDER — FAMOTIDINE 20 MG/1
20 TABLET, FILM COATED ORAL 2 TIMES DAILY
Status: DISCONTINUED | OUTPATIENT
Start: 2019-01-01 | End: 2019-01-01 | Stop reason: HOSPADM

## 2019-01-01 RX ORDER — DEXTROSE MONOHYDRATE AND SODIUM CHLORIDE 5; .45 G/100ML; G/100ML
50 INJECTION, SOLUTION INTRAVENOUS CONTINUOUS
Status: DISCONTINUED | OUTPATIENT
Start: 2019-01-01 | End: 2019-01-01

## 2019-01-01 RX ORDER — SERTRALINE HYDROCHLORIDE 100 MG/1
100 TABLET, FILM COATED ORAL DAILY
Status: DISCONTINUED | OUTPATIENT
Start: 2019-01-01 | End: 2019-01-01 | Stop reason: HOSPADM

## 2019-01-01 RX ORDER — CIPROFLOXACIN 500 MG/1
500 TABLET ORAL EVERY 12 HOURS
Status: DISCONTINUED | OUTPATIENT
Start: 2019-01-01 | End: 2019-01-01 | Stop reason: HOSPADM

## 2019-01-01 RX ORDER — PREDNISONE 10 MG/1
10 TABLET ORAL DAILY
Qty: 5 TAB | Refills: 0 | Status: SHIPPED | OUTPATIENT
Start: 2019-01-01

## 2019-01-01 RX ORDER — HYDROCODONE BITARTRATE AND HOMATROPINE METHYLBROMIDE 1.5; 5 MG/5ML; MG/5ML
5 SYRUP ORAL ONCE
Status: COMPLETED | OUTPATIENT
Start: 2019-01-01 | End: 2019-01-01

## 2019-01-01 RX ORDER — PREDNISONE 10 MG/1
20 TABLET ORAL
Status: DISCONTINUED | OUTPATIENT
Start: 2019-01-01 | End: 2019-01-01

## 2019-01-01 RX ORDER — CIPROFLOXACIN 500 MG/1
500 TABLET ORAL EVERY 12 HOURS
Status: DISCONTINUED | OUTPATIENT
Start: 2019-01-01 | End: 2019-01-01

## 2019-01-01 RX ORDER — CLINDAMYCIN PHOSPHATE 600 MG/50ML
600 INJECTION INTRAVENOUS
Status: COMPLETED | OUTPATIENT
Start: 2019-01-01 | End: 2019-01-01

## 2019-01-01 RX ORDER — LORAZEPAM 1 MG/1
1 TABLET ORAL
Qty: 30 TAB | Refills: 0 | Status: SHIPPED | OUTPATIENT
Start: 2019-01-01

## 2019-01-01 RX ORDER — SODIUM CHLORIDE 0.9 % (FLUSH) 0.9 %
5-40 SYRINGE (ML) INJECTION AS NEEDED
Status: DISCONTINUED | OUTPATIENT
Start: 2019-01-01 | End: 2019-01-01 | Stop reason: HOSPADM

## 2019-01-01 RX ORDER — SODIUM CHLORIDE 9 MG/ML
150 INJECTION, SOLUTION INTRAVENOUS ONCE
Status: COMPLETED | OUTPATIENT
Start: 2019-01-01 | End: 2019-01-01

## 2019-01-01 RX ORDER — LORAZEPAM 1 MG/1
1 TABLET ORAL EVERY 8 HOURS
Status: DISCONTINUED | OUTPATIENT
Start: 2019-01-01 | End: 2019-01-01 | Stop reason: HOSPADM

## 2019-01-01 RX ORDER — GADOTERATE MEGLUMINE 376.9 MG/ML
15 INJECTION INTRAVENOUS
Status: COMPLETED | OUTPATIENT
Start: 2019-01-01 | End: 2019-01-01

## 2019-01-01 RX ADMIN — IPRATROPIUM BROMIDE AND ALBUTEROL SULFATE 3 ML: .5; 3 SOLUTION RESPIRATORY (INHALATION) at 21:26

## 2019-01-01 RX ADMIN — FAMOTIDINE 20 MG: 20 TABLET ORAL at 20:41

## 2019-01-01 RX ADMIN — CIPROFLOXACIN HYDROCHLORIDE 500 MG: 500 TABLET, FILM COATED ORAL at 21:46

## 2019-01-01 RX ADMIN — DOXYCYCLINE HYCLATE 100 MG: 100 CAPSULE, GELATIN COATED ORAL at 08:55

## 2019-01-01 RX ADMIN — CLONAZEPAM 1 MG: 1 TABLET ORAL at 14:26

## 2019-01-01 RX ADMIN — CLONAZEPAM 1 MG: 1 TABLET ORAL at 15:25

## 2019-01-01 RX ADMIN — PRAMIPEXOLE DIHYDROCHLORIDE 1 MG: 1 TABLET ORAL at 21:00

## 2019-01-01 RX ADMIN — PANTOPRAZOLE SODIUM 40 MG: 40 TABLET, DELAYED RELEASE ORAL at 08:17

## 2019-01-01 RX ADMIN — SENNOSIDES 8.6 MG: 8.6 TABLET, FILM COATED ORAL at 08:48

## 2019-01-01 RX ADMIN — PREDNISONE 20 MG: 10 TABLET ORAL at 09:34

## 2019-01-01 RX ADMIN — MORPHINE SULFATE 5 MG: 100 SOLUTION ORAL at 21:02

## 2019-01-01 RX ADMIN — DOXYCYCLINE HYCLATE 100 MG: 100 CAPSULE, GELATIN COATED ORAL at 10:10

## 2019-01-01 RX ADMIN — Medication 250 MG: at 21:00

## 2019-01-01 RX ADMIN — MORPHINE SULFATE 5 MG: 100 SOLUTION ORAL at 20:27

## 2019-01-01 RX ADMIN — SULFAMETHOXAZOLE AND TRIMETHOPRIM 1 TABLET: 800; 160 TABLET ORAL at 20:28

## 2019-01-01 RX ADMIN — CEPHALEXIN 500 MG: 500 CAPSULE ORAL at 12:14

## 2019-01-01 RX ADMIN — IPRATROPIUM BROMIDE AND ALBUTEROL SULFATE 3 ML: .5; 3 SOLUTION RESPIRATORY (INHALATION) at 07:36

## 2019-01-01 RX ADMIN — LORAZEPAM 1 MG: 1 TABLET ORAL at 16:14

## 2019-01-01 RX ADMIN — SERTRALINE HYDROCHLORIDE 100 MG: 50 TABLET ORAL at 11:05

## 2019-01-01 RX ADMIN — BUDESONIDE 500 MCG: 0.5 INHALANT RESPIRATORY (INHALATION) at 07:36

## 2019-01-01 RX ADMIN — CEPHALEXIN 500 MG: 500 CAPSULE ORAL at 05:29

## 2019-01-01 RX ADMIN — CEFTRIAXONE 1 G: 1 INJECTION, POWDER, FOR SOLUTION INTRAMUSCULAR; INTRAVENOUS at 19:47

## 2019-01-01 RX ADMIN — MORPHINE SULFATE 5 MG: 100 SOLUTION ORAL at 20:34

## 2019-01-01 RX ADMIN — SERTRALINE HYDROCHLORIDE 100 MG: 50 TABLET ORAL at 09:04

## 2019-01-01 RX ADMIN — IPRATROPIUM BROMIDE AND ALBUTEROL SULFATE 3 ML: .5; 3 SOLUTION RESPIRATORY (INHALATION) at 13:42

## 2019-01-01 RX ADMIN — MORPHINE SULFATE 0.5 MG: 2 INJECTION, SOLUTION INTRAMUSCULAR; INTRAVENOUS at 13:05

## 2019-01-01 RX ADMIN — FAMOTIDINE 20 MG: 20 TABLET, FILM COATED ORAL at 08:17

## 2019-01-01 RX ADMIN — Medication 10 ML: at 22:00

## 2019-01-01 RX ADMIN — HEPARIN SODIUM 5000 UNITS: 5000 INJECTION INTRAVENOUS; SUBCUTANEOUS at 11:37

## 2019-01-01 RX ADMIN — LORAZEPAM 1 MG: 1 TABLET ORAL at 02:21

## 2019-01-01 RX ADMIN — MORPHINE SULFATE 5 MG: 100 SOLUTION ORAL at 21:22

## 2019-01-01 RX ADMIN — Medication 10 ML: at 14:31

## 2019-01-01 RX ADMIN — MORPHINE SULFATE 0.5 MG: 2 INJECTION, SOLUTION INTRAMUSCULAR; INTRAVENOUS at 16:34

## 2019-01-01 RX ADMIN — IPRATROPIUM BROMIDE AND ALBUTEROL SULFATE 3 ML: .5; 3 SOLUTION RESPIRATORY (INHALATION) at 20:41

## 2019-01-01 RX ADMIN — SENNOSIDES 8.6 MG: 8.6 TABLET, FILM COATED ORAL at 10:05

## 2019-01-01 RX ADMIN — LORAZEPAM 1 MG: 1 TABLET ORAL at 22:10

## 2019-01-01 RX ADMIN — HYDROCODONE BITARTRATE AND HOMATROPINE METHYLBROMIDE 5 ML: 5; 1.5 SOLUTION ORAL at 21:55

## 2019-01-01 RX ADMIN — LORAZEPAM 1 MG: 1 TABLET ORAL at 00:49

## 2019-01-01 RX ADMIN — FAMOTIDINE 20 MG: 20 TABLET, FILM COATED ORAL at 08:35

## 2019-01-01 RX ADMIN — PREDNISONE 10 MG: 10 TABLET ORAL at 08:52

## 2019-01-01 RX ADMIN — LORAZEPAM 1 MG: 1 TABLET ORAL at 05:17

## 2019-01-01 RX ADMIN — FLUTICASONE PROPIONATE AND SALMETEROL 1 PUFF: 50; 250 POWDER RESPIRATORY (INHALATION) at 08:17

## 2019-01-01 RX ADMIN — CLONAZEPAM 1 MG: 1 TABLET ORAL at 20:26

## 2019-01-01 RX ADMIN — IPRATROPIUM BROMIDE AND ALBUTEROL SULFATE 3 ML: .5; 3 SOLUTION RESPIRATORY (INHALATION) at 11:03

## 2019-01-01 RX ADMIN — Medication 250 MG: at 08:35

## 2019-01-01 RX ADMIN — LORAZEPAM 1 MG: 1 TABLET ORAL at 16:27

## 2019-01-01 RX ADMIN — LORAZEPAM 1 MG: 1 TABLET ORAL at 20:06

## 2019-01-01 RX ADMIN — PREDNISONE 10 MG: 10 TABLET ORAL at 11:05

## 2019-01-01 RX ADMIN — CIPROFLOXACIN HYDROCHLORIDE 500 MG: 500 TABLET, FILM COATED ORAL at 11:04

## 2019-01-01 RX ADMIN — FAMOTIDINE 20 MG: 20 TABLET ORAL at 08:58

## 2019-01-01 RX ADMIN — LORAZEPAM 0.5 MG: 2 INJECTION INTRAMUSCULAR; INTRAVENOUS at 20:34

## 2019-01-01 RX ADMIN — LORAZEPAM 1 MG: 1 TABLET ORAL at 18:09

## 2019-01-01 RX ADMIN — LORAZEPAM 1 MG: 1 TABLET ORAL at 06:20

## 2019-01-01 RX ADMIN — FAMOTIDINE 20 MG: 20 TABLET ORAL at 19:59

## 2019-01-01 RX ADMIN — HEPARIN SODIUM 5000 UNITS: 5000 INJECTION INTRAVENOUS; SUBCUTANEOUS at 20:26

## 2019-01-01 RX ADMIN — LORAZEPAM 1 MG: 1 TABLET ORAL at 20:43

## 2019-01-01 RX ADMIN — SODIUM CHLORIDE, PRESERVATIVE FREE 3 ML: 5 INJECTION INTRAVENOUS at 20:14

## 2019-01-01 RX ADMIN — DOXYCYCLINE HYCLATE 100 MG: 100 CAPSULE, GELATIN COATED ORAL at 20:29

## 2019-01-01 RX ADMIN — MORPHINE SULFATE 2 MG: 2 INJECTION, SOLUTION INTRAMUSCULAR; INTRAVENOUS at 06:33

## 2019-01-01 RX ADMIN — Medication 250 MG: at 08:53

## 2019-01-01 RX ADMIN — SERTRALINE HYDROCHLORIDE 100 MG: 100 TABLET ORAL at 08:47

## 2019-01-01 RX ADMIN — LORAZEPAM 1 MG: 1 TABLET ORAL at 05:54

## 2019-01-01 RX ADMIN — IPRATROPIUM BROMIDE AND ALBUTEROL SULFATE 3 ML: .5; 3 SOLUTION RESPIRATORY (INHALATION) at 08:12

## 2019-01-01 RX ADMIN — PREDNISONE 10 MG: 10 TABLET ORAL at 09:04

## 2019-01-01 RX ADMIN — CIPROFLOXACIN HYDROCHLORIDE 500 MG: 500 TABLET, FILM COATED ORAL at 08:11

## 2019-01-01 RX ADMIN — IPRATROPIUM BROMIDE AND ALBUTEROL SULFATE 3 ML: .5; 3 SOLUTION RESPIRATORY (INHALATION) at 14:00

## 2019-01-01 RX ADMIN — LORAZEPAM 1 MG: 1 TABLET ORAL at 21:22

## 2019-01-01 RX ADMIN — VANCOMYCIN HYDROCHLORIDE 1000 MG: 1 INJECTION, POWDER, LYOPHILIZED, FOR SOLUTION INTRAVENOUS at 16:37

## 2019-01-01 RX ADMIN — SERTRALINE HYDROCHLORIDE 100 MG: 50 TABLET ORAL at 08:52

## 2019-01-01 RX ADMIN — MORPHINE SULFATE 5 MG: 100 SOLUTION ORAL at 16:36

## 2019-01-01 RX ADMIN — SENNOSIDES 8.6 MG: 8.6 TABLET, FILM COATED ORAL at 20:05

## 2019-01-01 RX ADMIN — MORPHINE SULFATE 5 MG: 100 SOLUTION ORAL at 00:51

## 2019-01-01 RX ADMIN — CLONAZEPAM 1 MG: 1 TABLET ORAL at 10:05

## 2019-01-01 RX ADMIN — SENNOSIDES 8.6 MG: 8.6 TABLET, FILM COATED ORAL at 08:12

## 2019-01-01 RX ADMIN — SENNOSIDES 8.6 MG: 8.6 TABLET, FILM COATED ORAL at 20:00

## 2019-01-01 RX ADMIN — PREDNISONE 20 MG: 10 TABLET ORAL at 08:34

## 2019-01-01 RX ADMIN — Medication 10 ML: at 23:40

## 2019-01-01 RX ADMIN — BENZONATATE 200 MG: 100 CAPSULE ORAL at 08:18

## 2019-01-01 RX ADMIN — BENZONATATE 200 MG: 100 CAPSULE ORAL at 14:25

## 2019-01-01 RX ADMIN — LORAZEPAM 1 MG: 1 TABLET ORAL at 21:46

## 2019-01-01 RX ADMIN — BUDESONIDE 500 MCG: 0.5 INHALANT RESPIRATORY (INHALATION) at 19:28

## 2019-01-01 RX ADMIN — MORPHINE SULFATE 0.5 MG: 2 INJECTION, SOLUTION INTRAMUSCULAR; INTRAVENOUS at 16:51

## 2019-01-01 RX ADMIN — CIPROFLOXACIN HYDROCHLORIDE 500 MG: 500 TABLET, FILM COATED ORAL at 20:00

## 2019-01-01 RX ADMIN — DOXYCYCLINE HYCLATE 100 MG: 100 CAPSULE, GELATIN COATED ORAL at 11:04

## 2019-01-01 RX ADMIN — FAMOTIDINE 20 MG: 20 TABLET ORAL at 20:29

## 2019-01-01 RX ADMIN — FLUTICASONE PROPIONATE AND SALMETEROL 1 PUFF: 50; 250 POWDER RESPIRATORY (INHALATION) at 20:42

## 2019-01-01 RX ADMIN — SENNOSIDES 8.6 MG: 8.6 TABLET, FILM COATED ORAL at 09:34

## 2019-01-01 RX ADMIN — MORPHINE SULFATE 5 MG: 100 SOLUTION ORAL at 03:02

## 2019-01-01 RX ADMIN — BENZONATATE 200 MG: 100 CAPSULE ORAL at 20:31

## 2019-01-01 RX ADMIN — IPRATROPIUM BROMIDE AND ALBUTEROL SULFATE 3 ML: .5; 3 SOLUTION RESPIRATORY (INHALATION) at 19:59

## 2019-01-01 RX ADMIN — Medication 5 ML: at 20:36

## 2019-01-01 RX ADMIN — CEFTRIAXONE 1 G: 1 INJECTION, POWDER, FOR SOLUTION INTRAMUSCULAR; INTRAVENOUS at 19:59

## 2019-01-01 RX ADMIN — VANCOMYCIN HYDROCHLORIDE 1000 MG: 1 INJECTION, POWDER, LYOPHILIZED, FOR SOLUTION INTRAVENOUS at 04:08

## 2019-01-01 RX ADMIN — FAMOTIDINE 20 MG: 20 TABLET, FILM COATED ORAL at 09:34

## 2019-01-01 RX ADMIN — MORPHINE SULFATE 5 MG: 100 SOLUTION ORAL at 10:11

## 2019-01-01 RX ADMIN — PANTOPRAZOLE SODIUM 40 MG: 40 TABLET, DELAYED RELEASE ORAL at 08:57

## 2019-01-01 RX ADMIN — IPRATROPIUM BROMIDE AND ALBUTEROL SULFATE 3 ML: .5; 3 SOLUTION RESPIRATORY (INHALATION) at 14:54

## 2019-01-01 RX ADMIN — HEPARIN SODIUM 5000 UNITS: 5000 INJECTION INTRAVENOUS; SUBCUTANEOUS at 05:29

## 2019-01-01 RX ADMIN — BENZONATATE 200 MG: 100 CAPSULE ORAL at 08:47

## 2019-01-01 RX ADMIN — SERTRALINE HYDROCHLORIDE 100 MG: 100 TABLET ORAL at 08:18

## 2019-01-01 RX ADMIN — SULFAMETHOXAZOLE AND TRIMETHOPRIM 1 TABLET: 800; 160 TABLET ORAL at 08:17

## 2019-01-01 RX ADMIN — LORAZEPAM 1 MG: 2 INJECTION INTRAMUSCULAR; INTRAVENOUS at 18:17

## 2019-01-01 RX ADMIN — MORPHINE SULFATE 5 MG: 100 SOLUTION ORAL at 18:08

## 2019-01-01 RX ADMIN — HEPARIN SODIUM 5000 UNITS: 5000 INJECTION INTRAVENOUS; SUBCUTANEOUS at 05:01

## 2019-01-01 RX ADMIN — LORAZEPAM 1 MG: 1 TABLET ORAL at 10:10

## 2019-01-01 RX ADMIN — PREDNISONE 20 MG: 10 TABLET ORAL at 10:05

## 2019-01-01 RX ADMIN — SODIUM CHLORIDE 150 ML/HR: 900 INJECTION, SOLUTION INTRAVENOUS at 15:20

## 2019-01-01 RX ADMIN — BENZONATATE 200 MG: 100 CAPSULE ORAL at 20:50

## 2019-01-01 RX ADMIN — BUDESONIDE 500 MCG: 0.5 INHALANT RESPIRATORY (INHALATION) at 08:17

## 2019-01-01 RX ADMIN — SODIUM CHLORIDE, PRESERVATIVE FREE 3 ML: 5 INJECTION INTRAVENOUS at 17:47

## 2019-01-01 RX ADMIN — TRAZODONE HYDROCHLORIDE 150 MG: 50 TABLET ORAL at 21:22

## 2019-01-01 RX ADMIN — TRAZODONE HYDROCHLORIDE 150 MG: 50 TABLET ORAL at 20:50

## 2019-01-01 RX ADMIN — LORAZEPAM 0.5 MG: 2 INJECTION INTRAMUSCULAR; INTRAVENOUS at 11:36

## 2019-01-01 RX ADMIN — Medication 5 ML: at 08:21

## 2019-01-01 RX ADMIN — IPRATROPIUM BROMIDE AND ALBUTEROL SULFATE 3 ML: .5; 3 SOLUTION RESPIRATORY (INHALATION) at 08:35

## 2019-01-01 RX ADMIN — LORAZEPAM 1 MG: 1 TABLET ORAL at 13:55

## 2019-01-01 RX ADMIN — CIPROFLOXACIN HYDROCHLORIDE 500 MG: 500 TABLET, FILM COATED ORAL at 08:34

## 2019-01-01 RX ADMIN — HEPARIN SODIUM 5000 UNITS: 5000 INJECTION INTRAVENOUS; SUBCUTANEOUS at 04:20

## 2019-01-01 RX ADMIN — CLONAZEPAM 1 MG: 1 TABLET ORAL at 16:54

## 2019-01-01 RX ADMIN — SENNOSIDES 8.6 MG: 8.6 TABLET, FILM COATED ORAL at 08:52

## 2019-01-01 RX ADMIN — MORPHINE SULFATE 0.5 MG: 2 INJECTION, SOLUTION INTRAMUSCULAR; INTRAVENOUS at 19:47

## 2019-01-01 RX ADMIN — MORPHINE SULFATE 0.5 MG: 2 INJECTION, SOLUTION INTRAMUSCULAR; INTRAVENOUS at 09:45

## 2019-01-01 RX ADMIN — FAMOTIDINE 20 MG: 20 TABLET ORAL at 20:25

## 2019-01-01 RX ADMIN — FLUTICASONE PROPIONATE AND SALMETEROL 1 PUFF: 50; 250 POWDER RESPIRATORY (INHALATION) at 09:00

## 2019-01-01 RX ADMIN — LORAZEPAM 1 MG: 1 TABLET ORAL at 13:28

## 2019-01-01 RX ADMIN — SERTRALINE HYDROCHLORIDE 100 MG: 50 TABLET ORAL at 08:17

## 2019-01-01 RX ADMIN — PRAMIPEXOLE DIHYDROCHLORIDE 1 MG: 1 TABLET ORAL at 20:07

## 2019-01-01 RX ADMIN — PRAMIPEXOLE DIHYDROCHLORIDE 1 MG: 1 TABLET ORAL at 20:24

## 2019-01-01 RX ADMIN — PANTOPRAZOLE SODIUM 40 MG: 40 TABLET, DELAYED RELEASE ORAL at 08:12

## 2019-01-01 RX ADMIN — PRAMIPEXOLE DIHYDROCHLORIDE 1 MG: 1 TABLET ORAL at 20:26

## 2019-01-01 RX ADMIN — MORPHINE SULFATE 2 MG: 2 INJECTION, SOLUTION INTRAMUSCULAR; INTRAVENOUS at 10:06

## 2019-01-01 RX ADMIN — MORPHINE SULFATE 5 MG: 100 SOLUTION ORAL at 11:06

## 2019-01-01 RX ADMIN — FLUTICASONE PROPIONATE AND SALMETEROL 1 PUFF: 50; 250 POWDER RESPIRATORY (INHALATION) at 10:11

## 2019-01-01 RX ADMIN — LORAZEPAM 1 MG: 1 TABLET ORAL at 11:05

## 2019-01-01 RX ADMIN — PRAMIPEXOLE DIHYDROCHLORIDE 1 MG: 1 TABLET ORAL at 21:46

## 2019-01-01 RX ADMIN — FLUTICASONE PROPIONATE AND SALMETEROL 1 PUFF: 50; 250 POWDER RESPIRATORY (INHALATION) at 20:00

## 2019-01-01 RX ADMIN — FLUTICASONE PROPIONATE AND SALMETEROL 1 PUFF: 50; 250 POWDER RESPIRATORY (INHALATION) at 11:04

## 2019-01-01 RX ADMIN — CIPROFLOXACIN HYDROCHLORIDE 500 MG: 500 TABLET, FILM COATED ORAL at 20:26

## 2019-01-01 RX ADMIN — Medication 10 ML: at 13:05

## 2019-01-01 RX ADMIN — CLONAZEPAM 1 MG: 1 TABLET ORAL at 20:50

## 2019-01-01 RX ADMIN — BENZONATATE 200 MG: 100 CAPSULE ORAL at 21:12

## 2019-01-01 RX ADMIN — Medication 250 MG: at 21:46

## 2019-01-01 RX ADMIN — FAMOTIDINE 20 MG: 20 TABLET ORAL at 09:04

## 2019-01-01 RX ADMIN — PRAMIPEXOLE DIHYDROCHLORIDE 1 MG: 1 TABLET ORAL at 20:31

## 2019-01-01 RX ADMIN — FLUTICASONE PROPIONATE AND SALMETEROL 1 PUFF: 50; 250 POWDER RESPIRATORY (INHALATION) at 20:07

## 2019-01-01 RX ADMIN — DOXYCYCLINE HYCLATE 100 MG: 100 CAPSULE, GELATIN COATED ORAL at 08:17

## 2019-01-01 RX ADMIN — IPRATROPIUM BROMIDE AND ALBUTEROL SULFATE 3 ML: .5; 3 SOLUTION RESPIRATORY (INHALATION) at 07:46

## 2019-01-01 RX ADMIN — DOXYCYCLINE HYCLATE 100 MG: 100 CAPSULE, GELATIN COATED ORAL at 08:36

## 2019-01-01 RX ADMIN — IPRATROPIUM BROMIDE AND ALBUTEROL SULFATE 3 ML: .5; 3 SOLUTION RESPIRATORY (INHALATION) at 20:29

## 2019-01-01 RX ADMIN — BENZONATATE 200 MG: 100 CAPSULE ORAL at 16:33

## 2019-01-01 RX ADMIN — IPRATROPIUM BROMIDE AND ALBUTEROL SULFATE 3 ML: .5; 3 SOLUTION RESPIRATORY (INHALATION) at 16:27

## 2019-01-01 RX ADMIN — Medication 250 MG: at 11:05

## 2019-01-01 RX ADMIN — CLONAZEPAM 1 MG: 1 TABLET ORAL at 16:33

## 2019-01-01 RX ADMIN — Medication 250 MG: at 20:00

## 2019-01-01 RX ADMIN — IPRATROPIUM BROMIDE AND ALBUTEROL SULFATE 3 ML: .5; 3 SOLUTION RESPIRATORY (INHALATION) at 19:42

## 2019-01-01 RX ADMIN — Medication 10 ML: at 11:13

## 2019-01-01 RX ADMIN — IPRATROPIUM BROMIDE AND ALBUTEROL SULFATE 3 ML: .5; 3 SOLUTION RESPIRATORY (INHALATION) at 08:17

## 2019-01-01 RX ADMIN — CIPROFLOXACIN HYDROCHLORIDE 500 MG: 500 TABLET, FILM COATED ORAL at 20:06

## 2019-01-01 RX ADMIN — LORAZEPAM 1 MG: 1 TABLET ORAL at 21:10

## 2019-01-01 RX ADMIN — MORPHINE SULFATE 1 MG: 2 INJECTION, SOLUTION INTRAMUSCULAR; INTRAVENOUS at 23:12

## 2019-01-01 RX ADMIN — FLUTICASONE PROPIONATE AND SALMETEROL 1 PUFF: 50; 250 POWDER RESPIRATORY (INHALATION) at 08:36

## 2019-01-01 RX ADMIN — FLUTICASONE PROPIONATE AND SALMETEROL 1 PUFF: 50; 250 POWDER RESPIRATORY (INHALATION) at 21:47

## 2019-01-01 RX ADMIN — FLUTICASONE PROPIONATE AND SALMETEROL 1 PUFF: 50; 250 POWDER RESPIRATORY (INHALATION) at 22:09

## 2019-01-01 RX ADMIN — TRAZODONE HYDROCHLORIDE 150 MG: 50 TABLET ORAL at 20:42

## 2019-01-01 RX ADMIN — BENZONATATE 200 MG: 100 CAPSULE ORAL at 16:55

## 2019-01-01 RX ADMIN — PREDNISONE 20 MG: 10 TABLET ORAL at 08:47

## 2019-01-01 RX ADMIN — CEFTRIAXONE 1 G: 1 INJECTION, POWDER, FOR SOLUTION INTRAMUSCULAR; INTRAVENOUS at 20:25

## 2019-01-01 RX ADMIN — SODIUM CHLORIDE, PRESERVATIVE FREE 3 ML: 5 INJECTION INTRAVENOUS at 12:27

## 2019-01-01 RX ADMIN — PREDNISONE 20 MG: 10 TABLET ORAL at 08:58

## 2019-01-01 RX ADMIN — SERTRALINE HYDROCHLORIDE 100 MG: 50 TABLET ORAL at 08:58

## 2019-01-01 RX ADMIN — SENNOSIDES 8.6 MG: 8.6 TABLET, FILM COATED ORAL at 08:18

## 2019-01-01 RX ADMIN — LORAZEPAM 1 MG: 1 TABLET ORAL at 13:33

## 2019-01-01 RX ADMIN — SENNOSIDES 8.6 MG: 8.6 TABLET, FILM COATED ORAL at 08:35

## 2019-01-01 RX ADMIN — Medication 500 MG: at 08:17

## 2019-01-01 RX ADMIN — FAMOTIDINE 20 MG: 20 TABLET ORAL at 20:06

## 2019-01-01 RX ADMIN — Medication 5 ML: at 21:00

## 2019-01-01 RX ADMIN — IPRATROPIUM BROMIDE AND ALBUTEROL SULFATE 3 ML: .5; 3 SOLUTION RESPIRATORY (INHALATION) at 14:27

## 2019-01-01 RX ADMIN — MORPHINE SULFATE 5 MG: 100 SOLUTION ORAL at 02:21

## 2019-01-01 RX ADMIN — FAMOTIDINE 20 MG: 20 TABLET ORAL at 08:12

## 2019-01-01 RX ADMIN — SERTRALINE HYDROCHLORIDE 100 MG: 100 TABLET ORAL at 09:34

## 2019-01-01 RX ADMIN — PANTOPRAZOLE SODIUM 40 MG: 40 TABLET, DELAYED RELEASE ORAL at 06:32

## 2019-01-01 RX ADMIN — IPRATROPIUM BROMIDE AND ALBUTEROL SULFATE 3 ML: .5; 3 SOLUTION RESPIRATORY (INHALATION) at 08:59

## 2019-01-01 RX ADMIN — CIPROFLOXACIN HYDROCHLORIDE 500 MG: 500 TABLET, FILM COATED ORAL at 20:29

## 2019-01-01 RX ADMIN — SERTRALINE HYDROCHLORIDE 100 MG: 50 TABLET ORAL at 08:34

## 2019-01-01 RX ADMIN — CIPROFLOXACIN HYDROCHLORIDE 500 MG: 500 TABLET, FILM COATED ORAL at 21:22

## 2019-01-01 RX ADMIN — TRAZODONE HYDROCHLORIDE 150 MG: 50 TABLET ORAL at 21:46

## 2019-01-01 RX ADMIN — IPRATROPIUM BROMIDE AND ALBUTEROL SULFATE 3 ML: .5; 3 SOLUTION RESPIRATORY (INHALATION) at 13:54

## 2019-01-01 RX ADMIN — MORPHINE SULFATE 2 MG: 2 INJECTION, SOLUTION INTRAMUSCULAR; INTRAVENOUS at 01:12

## 2019-01-01 RX ADMIN — IPRATROPIUM BROMIDE AND ALBUTEROL SULFATE 3 ML: .5; 3 SOLUTION RESPIRATORY (INHALATION) at 19:28

## 2019-01-01 RX ADMIN — IPRATROPIUM BROMIDE AND ALBUTEROL SULFATE 3 ML: .5; 3 SOLUTION RESPIRATORY (INHALATION) at 01:13

## 2019-01-01 RX ADMIN — MORPHINE SULFATE 2 MG: 2 INJECTION, SOLUTION INTRAMUSCULAR; INTRAVENOUS at 02:59

## 2019-01-01 RX ADMIN — LORAZEPAM 1 MG: 1 TABLET ORAL at 00:52

## 2019-01-01 RX ADMIN — Medication 10 ML: at 05:12

## 2019-01-01 RX ADMIN — LORAZEPAM 1 MG: 1 TABLET ORAL at 14:27

## 2019-01-01 RX ADMIN — FAMOTIDINE 20 MG: 20 TABLET ORAL at 11:04

## 2019-01-01 RX ADMIN — FAMOTIDINE 20 MG: 20 TABLET ORAL at 21:22

## 2019-01-01 RX ADMIN — MORPHINE SULFATE 5 MG: 100 SOLUTION ORAL at 22:55

## 2019-01-01 RX ADMIN — MORPHINE SULFATE 2 MG: 2 INJECTION, SOLUTION INTRAMUSCULAR; INTRAVENOUS at 14:29

## 2019-01-01 RX ADMIN — PREDNISONE 10 MG: 10 TABLET ORAL at 08:34

## 2019-01-01 RX ADMIN — MORPHINE SULFATE 5 MG: 100 SOLUTION ORAL at 13:32

## 2019-01-01 RX ADMIN — CLONAZEPAM 1 MG: 1 TABLET ORAL at 21:12

## 2019-01-01 RX ADMIN — MORPHINE SULFATE 0.5 MG: 2 INJECTION, SOLUTION INTRAMUSCULAR; INTRAVENOUS at 21:55

## 2019-01-01 RX ADMIN — DOXYCYCLINE HYCLATE 100 MG: 100 CAPSULE, GELATIN COATED ORAL at 20:42

## 2019-01-01 RX ADMIN — Medication 10 ML: at 06:00

## 2019-01-01 RX ADMIN — CLONAZEPAM 1 MG: 1 TABLET ORAL at 09:34

## 2019-01-01 RX ADMIN — CLONAZEPAM 1 MG: 1 TABLET ORAL at 20:24

## 2019-01-01 RX ADMIN — SERTRALINE HYDROCHLORIDE 100 MG: 100 TABLET ORAL at 08:35

## 2019-01-01 RX ADMIN — HEPARIN SODIUM 5000 UNITS: 5000 INJECTION INTRAVENOUS; SUBCUTANEOUS at 12:14

## 2019-01-01 RX ADMIN — FAMOTIDINE 20 MG: 20 TABLET, FILM COATED ORAL at 10:05

## 2019-01-01 RX ADMIN — HEPARIN SODIUM 5000 UNITS: 5000 INJECTION INTRAVENOUS; SUBCUTANEOUS at 20:25

## 2019-01-01 RX ADMIN — DOXYCYCLINE HYCLATE 100 MG: 100 CAPSULE, GELATIN COATED ORAL at 20:00

## 2019-01-01 RX ADMIN — SENNOSIDES 8.6 MG: 8.6 TABLET, FILM COATED ORAL at 11:05

## 2019-01-01 RX ADMIN — FAMOTIDINE 20 MG: 20 TABLET, FILM COATED ORAL at 08:47

## 2019-01-01 RX ADMIN — TRAZODONE HYDROCHLORIDE 150 MG: 50 TABLET ORAL at 21:12

## 2019-01-01 RX ADMIN — HEPARIN SODIUM 5000 UNITS: 5000 INJECTION INTRAVENOUS; SUBCUTANEOUS at 19:56

## 2019-01-01 RX ADMIN — CEPHALEXIN 500 MG: 500 CAPSULE ORAL at 00:42

## 2019-01-01 RX ADMIN — HEPARIN SODIUM 5000 UNITS: 5000 INJECTION INTRAVENOUS; SUBCUTANEOUS at 20:17

## 2019-01-01 RX ADMIN — SODIUM CHLORIDE, PRESERVATIVE FREE 3 ML: 5 INJECTION INTRAVENOUS at 21:31

## 2019-01-01 RX ADMIN — SENNOSIDES 8.6 MG: 8.6 TABLET, FILM COATED ORAL at 20:41

## 2019-01-01 RX ADMIN — FAMOTIDINE 20 MG: 20 TABLET ORAL at 21:45

## 2019-01-01 RX ADMIN — CEPHALEXIN 500 MG: 500 CAPSULE ORAL at 17:50

## 2019-01-01 RX ADMIN — SERTRALINE HYDROCHLORIDE 100 MG: 100 TABLET ORAL at 10:05

## 2019-01-01 RX ADMIN — CLONAZEPAM 1 MG: 1 TABLET ORAL at 08:47

## 2019-01-01 RX ADMIN — SENNOSIDES 8.6 MG: 8.6 TABLET, FILM COATED ORAL at 20:29

## 2019-01-01 RX ADMIN — SULFAMETHOXAZOLE AND TRIMETHOPRIM 1 TABLET: 800; 160 TABLET ORAL at 12:14

## 2019-01-01 RX ADMIN — MORPHINE SULFATE 5 MG: 100 SOLUTION ORAL at 00:53

## 2019-01-01 RX ADMIN — PANTOPRAZOLE SODIUM 40 MG: 40 TABLET, DELAYED RELEASE ORAL at 08:52

## 2019-01-01 RX ADMIN — HEPARIN SODIUM 5000 UNITS: 5000 INJECTION INTRAVENOUS; SUBCUTANEOUS at 04:12

## 2019-01-01 RX ADMIN — Medication 250 MG: at 10:11

## 2019-01-01 RX ADMIN — Medication 250 MG: at 08:17

## 2019-01-01 RX ADMIN — CLONAZEPAM 1 MG: 1 TABLET ORAL at 08:34

## 2019-01-01 RX ADMIN — LORAZEPAM 1 MG: 1 TABLET ORAL at 02:33

## 2019-01-01 RX ADMIN — IPRATROPIUM BROMIDE AND ALBUTEROL SULFATE 3 ML: .5; 3 SOLUTION RESPIRATORY (INHALATION) at 02:00

## 2019-01-01 RX ADMIN — IPRATROPIUM BROMIDE AND ALBUTEROL SULFATE 3 ML: .5; 3 SOLUTION RESPIRATORY (INHALATION) at 02:21

## 2019-01-01 RX ADMIN — SERTRALINE HYDROCHLORIDE 100 MG: 50 TABLET ORAL at 08:11

## 2019-01-01 RX ADMIN — SENNOSIDES 8.6 MG: 8.6 TABLET, FILM COATED ORAL at 20:25

## 2019-01-01 RX ADMIN — BENZONATATE 200 MG: 100 CAPSULE ORAL at 09:34

## 2019-01-01 RX ADMIN — MORPHINE SULFATE 2 MG: 2 INJECTION, SOLUTION INTRAMUSCULAR; INTRAVENOUS at 17:47

## 2019-01-01 RX ADMIN — DOXYCYCLINE HYCLATE 100 MG: 100 CAPSULE, GELATIN COATED ORAL at 09:05

## 2019-01-01 RX ADMIN — CLONAZEPAM 1 MG: 1 TABLET ORAL at 23:51

## 2019-01-01 RX ADMIN — TRAZODONE HYDROCHLORIDE 150 MG: 50 TABLET ORAL at 21:30

## 2019-01-01 RX ADMIN — LORAZEPAM 1 MG: 1 TABLET ORAL at 20:03

## 2019-01-01 RX ADMIN — LORAZEPAM 1 MG: 1 TABLET ORAL at 06:32

## 2019-01-01 RX ADMIN — TRAZODONE HYDROCHLORIDE 150 MG: 50 TABLET ORAL at 22:11

## 2019-01-01 RX ADMIN — TRAZODONE HYDROCHLORIDE 150 MG: 50 TABLET ORAL at 21:07

## 2019-01-01 RX ADMIN — PANTOPRAZOLE SODIUM 40 MG: 40 TABLET, DELAYED RELEASE ORAL at 09:04

## 2019-01-01 RX ADMIN — LORAZEPAM 1 MG: 1 TABLET ORAL at 08:52

## 2019-01-01 RX ADMIN — GUAIFENESIN AND DEXTROMETHORPHAN 10 ML: 100; 10 SYRUP ORAL at 16:51

## 2019-01-01 RX ADMIN — TRAZODONE HYDROCHLORIDE 150 MG: 50 TABLET ORAL at 20:24

## 2019-01-01 RX ADMIN — CIPROFLOXACIN HYDROCHLORIDE 500 MG: 500 TABLET, FILM COATED ORAL at 09:05

## 2019-01-01 RX ADMIN — MORPHINE SULFATE 2 MG: 2 INJECTION, SOLUTION INTRAMUSCULAR; INTRAVENOUS at 23:52

## 2019-01-01 RX ADMIN — Medication 250 MG: at 20:42

## 2019-01-01 RX ADMIN — CIPROFLOXACIN HYDROCHLORIDE 500 MG: 500 TABLET, FILM COATED ORAL at 08:52

## 2019-01-01 RX ADMIN — CLINDAMYCIN PHOSPHATE 600 MG: 600 INJECTION, SOLUTION INTRAVENOUS at 15:57

## 2019-01-01 RX ADMIN — IPRATROPIUM BROMIDE AND ALBUTEROL SULFATE 3 ML: .5; 3 SOLUTION RESPIRATORY (INHALATION) at 08:02

## 2019-01-01 RX ADMIN — MORPHINE SULFATE 5 MG: 100 SOLUTION ORAL at 16:15

## 2019-01-01 RX ADMIN — MORPHINE SULFATE 5 MG: 100 SOLUTION ORAL at 20:03

## 2019-01-01 RX ADMIN — HYDRALAZINE HYDROCHLORIDE 10 MG: 20 INJECTION INTRAMUSCULAR; INTRAVENOUS at 08:19

## 2019-01-01 RX ADMIN — MORPHINE SULFATE 5 MG: 100 SOLUTION ORAL at 22:11

## 2019-01-01 RX ADMIN — MORPHINE SULFATE 5 MG: 100 SOLUTION ORAL at 19:42

## 2019-01-01 RX ADMIN — FAMOTIDINE 20 MG: 20 TABLET ORAL at 08:52

## 2019-01-01 RX ADMIN — CEFTRIAXONE 1 G: 1 INJECTION, POWDER, FOR SOLUTION INTRAMUSCULAR; INTRAVENOUS at 20:17

## 2019-01-01 RX ADMIN — CIPROFLOXACIN HYDROCHLORIDE 500 MG: 500 TABLET, FILM COATED ORAL at 08:17

## 2019-01-01 RX ADMIN — LORAZEPAM 1 MG: 1 TABLET ORAL at 04:14

## 2019-01-01 RX ADMIN — BENZONATATE 200 MG: 100 CAPSULE ORAL at 21:20

## 2019-01-01 RX ADMIN — IPRATROPIUM BROMIDE AND ALBUTEROL SULFATE 3 ML: .5; 3 SOLUTION RESPIRATORY (INHALATION) at 08:54

## 2019-01-01 RX ADMIN — SODIUM CHLORIDE 4.5 G: 900 INJECTION, SOLUTION INTRAVENOUS at 15:20

## 2019-01-01 RX ADMIN — HEPARIN SODIUM 5000 UNITS: 5000 INJECTION INTRAVENOUS; SUBCUTANEOUS at 04:08

## 2019-01-01 RX ADMIN — SENNOSIDES 8.6 MG: 8.6 TABLET, FILM COATED ORAL at 08:57

## 2019-01-01 RX ADMIN — SENNOSIDES 8.6 MG: 8.6 TABLET, FILM COATED ORAL at 08:17

## 2019-01-01 RX ADMIN — CIPROFLOXACIN HYDROCHLORIDE 500 MG: 500 TABLET, FILM COATED ORAL at 20:41

## 2019-01-01 RX ADMIN — DOXYCYCLINE HYCLATE 100 MG: 100 CAPSULE, GELATIN COATED ORAL at 21:00

## 2019-01-01 RX ADMIN — MORPHINE SULFATE 2 MG: 2 INJECTION, SOLUTION INTRAMUSCULAR; INTRAVENOUS at 11:00

## 2019-01-01 RX ADMIN — PRAMIPEXOLE DIHYDROCHLORIDE 1 MG: 1 TABLET ORAL at 20:50

## 2019-01-01 RX ADMIN — LORAZEPAM 1 MG: 1 TABLET ORAL at 06:16

## 2019-01-01 RX ADMIN — LORAZEPAM 0.5 MG: 2 INJECTION INTRAMUSCULAR; INTRAVENOUS at 15:57

## 2019-01-01 RX ADMIN — FAMOTIDINE 20 MG: 20 TABLET ORAL at 08:34

## 2019-01-01 RX ADMIN — LORAZEPAM 1 MG: 1 TABLET ORAL at 14:00

## 2019-01-01 RX ADMIN — LORAZEPAM 1 MG: 1 TABLET ORAL at 21:30

## 2019-01-01 RX ADMIN — MORPHINE SULFATE 2 MG: 2 INJECTION, SOLUTION INTRAMUSCULAR; INTRAVENOUS at 23:17

## 2019-01-01 RX ADMIN — CIPROFLOXACIN HYDROCHLORIDE 500 MG: 500 TABLET, FILM COATED ORAL at 16:14

## 2019-01-01 RX ADMIN — BUDESONIDE 500 MCG: 0.5 INHALANT RESPIRATORY (INHALATION) at 07:45

## 2019-01-01 RX ADMIN — PREDNISONE 20 MG: 10 TABLET ORAL at 08:17

## 2019-01-01 RX ADMIN — IPRATROPIUM BROMIDE AND ALBUTEROL SULFATE 3 ML: .5; 3 SOLUTION RESPIRATORY (INHALATION) at 01:26

## 2019-01-01 RX ADMIN — VANCOMYCIN HYDROCHLORIDE 1000 MG: 1 INJECTION, POWDER, LYOPHILIZED, FOR SOLUTION INTRAVENOUS at 04:15

## 2019-01-01 RX ADMIN — Medication 250 MG: at 09:04

## 2019-01-01 RX ADMIN — GADOTERATE MEGLUMINE 15 ML: 376.9 INJECTION INTRAVENOUS at 11:13

## 2019-01-01 RX ADMIN — PREDNISONE 20 MG: 10 TABLET ORAL at 08:18

## 2019-01-01 RX ADMIN — PREDNISONE 20 MG: 10 TABLET ORAL at 08:12

## 2019-01-01 RX ADMIN — BENZONATATE 200 MG: 100 CAPSULE ORAL at 08:35

## 2019-01-01 RX ADMIN — BUDESONIDE 500 MCG: 0.5 INHALANT RESPIRATORY (INHALATION) at 20:10

## 2019-01-01 RX ADMIN — TUBERCULIN PURIFIED PROTEIN DERIVATIVE 5 UNITS: 5 INJECTION INTRADERMAL at 08:00

## 2019-01-01 RX ADMIN — SENNOSIDES 8.6 MG: 8.6 TABLET, FILM COATED ORAL at 09:04

## 2019-01-01 RX ADMIN — DOXYCYCLINE HYCLATE 100 MG: 100 CAPSULE, GELATIN COATED ORAL at 21:47

## 2019-01-01 RX ADMIN — PRAMIPEXOLE DIHYDROCHLORIDE 1 MG: 1 TABLET ORAL at 21:12

## 2019-01-01 RX ADMIN — MORPHINE SULFATE 5 MG: 100 SOLUTION ORAL at 09:04

## 2019-01-01 RX ADMIN — VANCOMYCIN HYDROCHLORIDE 1750 MG: 10 INJECTION, POWDER, LYOPHILIZED, FOR SOLUTION INTRAVENOUS at 16:45

## 2019-01-01 RX ADMIN — LORAZEPAM 1 MG: 1 TABLET ORAL at 05:45

## 2019-01-01 RX ADMIN — CLONAZEPAM 1 MG: 1 TABLET ORAL at 08:17

## 2019-01-01 RX ADMIN — IPRATROPIUM BROMIDE AND ALBUTEROL SULFATE 3 ML: .5; 3 SOLUTION RESPIRATORY (INHALATION) at 09:05

## 2019-01-01 RX ADMIN — SODIUM CHLORIDE, PRESERVATIVE FREE 3 ML: 5 INJECTION INTRAVENOUS at 08:20

## 2019-01-01 RX ADMIN — MORPHINE SULFATE 5 MG: 100 SOLUTION ORAL at 06:32

## 2019-01-01 RX ADMIN — IPRATROPIUM BROMIDE AND ALBUTEROL SULFATE 3 ML: .5; 3 SOLUTION RESPIRATORY (INHALATION) at 20:05

## 2019-01-01 RX ADMIN — SENNOSIDES 8.6 MG: 8.6 TABLET, FILM COATED ORAL at 21:45

## 2019-01-01 RX ADMIN — BENZONATATE 200 MG: 100 CAPSULE ORAL at 15:25

## 2019-01-01 RX ADMIN — Medication 250 MG: at 20:31

## 2019-01-01 RX ADMIN — VANCOMYCIN HYDROCHLORIDE 1000 MG: 1 INJECTION, POWDER, LYOPHILIZED, FOR SOLUTION INTRAVENOUS at 16:53

## 2019-01-01 RX ADMIN — HEPARIN SODIUM 5000 UNITS: 5000 INJECTION INTRAVENOUS; SUBCUTANEOUS at 20:00

## 2019-01-01 RX ADMIN — BENZONATATE 200 MG: 100 CAPSULE ORAL at 10:05

## 2019-01-01 RX ADMIN — IPRATROPIUM BROMIDE AND ALBUTEROL SULFATE 3 ML: .5; 3 SOLUTION RESPIRATORY (INHALATION) at 13:33

## 2019-01-01 RX ADMIN — TRAZODONE HYDROCHLORIDE 150 MG: 50 TABLET ORAL at 20:27

## 2019-01-01 RX ADMIN — CIPROFLOXACIN HYDROCHLORIDE 500 MG: 500 TABLET, FILM COATED ORAL at 08:58

## 2019-01-01 RX ADMIN — MORPHINE SULFATE 2 MG: 2 INJECTION, SOLUTION INTRAMUSCULAR; INTRAVENOUS at 12:27

## 2019-01-01 RX ADMIN — FAMOTIDINE 20 MG: 20 TABLET ORAL at 08:17

## 2019-01-01 RX ADMIN — IPRATROPIUM BROMIDE AND ALBUTEROL SULFATE 3 ML: .5; 3 SOLUTION RESPIRATORY (INHALATION) at 14:16

## 2019-01-01 RX ADMIN — HEPARIN SODIUM 5000 UNITS: 5000 INJECTION INTRAVENOUS; SUBCUTANEOUS at 15:24

## 2019-01-01 RX ADMIN — LORAZEPAM 1 MG: 1 TABLET ORAL at 06:19

## 2019-01-01 RX ADMIN — SENNOSIDES 8.6 MG: 8.6 TABLET, FILM COATED ORAL at 08:34

## 2019-01-01 RX ADMIN — LORAZEPAM 1 MG: 2 INJECTION INTRAMUSCULAR; INTRAVENOUS at 01:12

## 2019-01-01 RX ADMIN — IPRATROPIUM BROMIDE AND ALBUTEROL SULFATE 3 ML: .5; 3 SOLUTION RESPIRATORY (INHALATION) at 20:25

## 2019-01-01 RX ADMIN — FLUTICASONE PROPIONATE AND SALMETEROL 1 PUFF: 50; 250 POWDER RESPIRATORY (INHALATION) at 20:29

## 2019-01-01 RX ADMIN — Medication 5 ML: at 05:30

## 2019-01-01 RX ADMIN — SENNOSIDES 8.6 MG: 8.6 TABLET, FILM COATED ORAL at 21:22

## 2019-01-01 RX ADMIN — Medication 10 ML: at 05:03

## 2019-01-01 RX ADMIN — PRAMIPEXOLE DIHYDROCHLORIDE 1 MG: 1 TABLET ORAL at 20:00

## 2019-01-01 RX ADMIN — VANCOMYCIN HYDROCHLORIDE 1000 MG: 1 INJECTION, POWDER, LYOPHILIZED, FOR SOLUTION INTRAVENOUS at 12:14

## 2019-01-01 RX ADMIN — BUDESONIDE 500 MCG: 0.5 INHALANT RESPIRATORY (INHALATION) at 08:02

## 2019-01-01 RX ADMIN — FLUTICASONE PROPIONATE AND SALMETEROL 1 PUFF: 50; 250 POWDER RESPIRATORY (INHALATION) at 08:12

## 2019-01-01 RX ADMIN — MORPHINE SULFATE 5 MG: 100 SOLUTION ORAL at 08:51

## 2019-01-01 RX ADMIN — VANCOMYCIN HYDROCHLORIDE 1000 MG: 1 INJECTION, POWDER, LYOPHILIZED, FOR SOLUTION INTRAVENOUS at 04:42

## 2019-01-01 RX ADMIN — IPRATROPIUM BROMIDE AND ALBUTEROL SULFATE 3 ML: .5; 3 SOLUTION RESPIRATORY (INHALATION) at 13:28

## 2019-01-01 RX ADMIN — PRAMIPEXOLE DIHYDROCHLORIDE 1 MG: 1 TABLET ORAL at 20:42

## 2019-01-01 RX ADMIN — FLUTICASONE PROPIONATE AND SALMETEROL 1 PUFF: 50; 250 POWDER RESPIRATORY (INHALATION) at 09:05

## 2019-01-01 RX ADMIN — MORPHINE SULFATE 5 MG: 100 SOLUTION ORAL at 04:14

## 2019-01-01 RX ADMIN — TRAZODONE HYDROCHLORIDE 150 MG: 50 TABLET ORAL at 20:05

## 2019-06-19 PROBLEM — F51.01 PRIMARY INSOMNIA: Status: ACTIVE | Noted: 2018-01-01

## 2019-06-19 PROBLEM — G14 POST-POLIO SYNDROME: Status: ACTIVE | Noted: 2018-01-01

## 2019-06-19 PROBLEM — J96.11 CHRONIC HYPOXEMIC RESPIRATORY FAILURE (HCC): Status: ACTIVE | Noted: 2019-01-01

## 2019-06-19 PROBLEM — A80.9 POLIO: Status: ACTIVE | Noted: 2019-01-01

## 2019-06-19 PROBLEM — F32.A DEPRESSION: Status: ACTIVE | Noted: 2019-01-01

## 2019-06-19 PROBLEM — Z86.718 H/O BLOOD CLOTS: Status: ACTIVE | Noted: 2019-01-01

## 2019-06-19 PROBLEM — Z87.09 HISTORY OF PNEUMOTHORAX: Status: ACTIVE | Noted: 2019-01-01

## 2019-06-19 PROBLEM — J44.9 COPD (CHRONIC OBSTRUCTIVE PULMONARY DISEASE) (HCC): Status: ACTIVE | Noted: 2018-01-01

## 2019-06-19 PROBLEM — I10 BENIGN HYPERTENSION: Status: ACTIVE | Noted: 2018-01-01

## 2019-06-19 PROBLEM — N32.0 BLADDER OUTLET OBSTRUCTION: Status: ACTIVE | Noted: 2018-01-01

## 2019-06-19 PROBLEM — N40.0 BPH (BENIGN PROSTATIC HYPERPLASIA): Status: ACTIVE | Noted: 2019-01-01

## 2019-06-19 PROBLEM — N39.0 UTI (URINARY TRACT INFECTION): Status: ACTIVE | Noted: 2019-01-01

## 2019-06-19 PROBLEM — R53.81 DEBILITY: Status: ACTIVE | Noted: 2019-01-01

## 2019-06-23 PROBLEM — T83.510D: Status: ACTIVE | Noted: 2019-01-01

## 2019-06-23 PROBLEM — F10.11: Status: ACTIVE | Noted: 2019-01-01

## 2019-06-23 PROBLEM — F13.20 BENZODIAZEPINE DEPENDENCE (HCC): Status: ACTIVE | Noted: 2019-01-01

## 2019-06-23 PROBLEM — G25.81 RESTLESS LEGS SYNDROME (RLS): Status: ACTIVE | Noted: 2019-01-01

## 2019-06-23 PROBLEM — N31.9 NEUROGENIC BLADDER DISORDER: Status: ACTIVE | Noted: 2019-01-01

## 2019-06-23 PROBLEM — N36.8 URETHRAL OBSTRUCTION: Status: ACTIVE | Noted: 2019-01-01

## 2019-09-25 PROBLEM — L03.115 CELLULITIS OF RIGHT LEG: Status: ACTIVE | Noted: 2019-01-01

## 2019-09-25 NOTE — PROGRESS NOTES
TRANSFER - IN REPORT: 
 
Verbal report received from Parul(name) on [facility].  being received from ED(unit) for routine progression of care Report consisted of patients Situation, Background, Assessment and  
Recommendations(SBAR). Information from the following report(s) SBAR and ED Summary was reviewed with the receiving nurse. Opportunity for questions and clarification was provided. Assessment completed upon patients arrival to unit and care assumed. Pt from ED in soiled towels from home, incontinent of stool. Knee wound draining. Erythema and boil type pockets noted. Pt states he was told he had MRSA; contact isolation initiated on arrival to floor.

## 2019-09-25 NOTE — ED TRIAGE NOTES
Pt presents to the ED via EMS with cellulitis on the LLE x unknown amount of time,  Pt currently in treatment with hospice, however cellulitis not improving.

## 2019-09-25 NOTE — ED PROVIDER NOTES
66-year-old male who is end-stage COPD and mostly bed confined presenting for worsening redness, swelling, abscess on the right knee. He has had small boils and skin infections for the past couple of weeks that has been getting treated by hospice. Over the past 48 hours though he is developed a large purulent area in the right medial knee with surrounding erythema streaking down the leg. He is also had decreased mental status increased work of breathing at times. The home nurse felt that this was beyond their capacity for treating felt that he might need IV antibiotics. Wife called EMS and the patient was brought here for further evaluation and treatment. The history is provided by the spouse. Skin Infection This is a new problem. The current episode started more than 2 days ago. The problem occurs constantly. The problem has been gradually worsening. Associated symptoms include shortness of breath. Pertinent negatives include no chest pain, no abdominal pain and no headaches. The symptoms are aggravated by walking. Nothing relieves the symptoms. He has tried a warm compress and a cold compress (wound dressing) for the symptoms. The treatment provided no relief. Past Medical History:  
Diagnosis Date  Ill-defined condition No past surgical history on file. No family history on file. Social History Socioeconomic History  Marital status: LEGALLY  Spouse name: Not on file  Number of children: Not on file  Years of education: Not on file  Highest education level: Not on file Occupational History  Not on file Social Needs  Financial resource strain: Somewhat hard  Food insecurity:  
  Worry: Patient refused Inability: Patient refused  Transportation needs:  
  Medical: Patient refused Non-medical: Patient refused Tobacco Use  Smoking status: Former Smoker Packs/day: 0.50 Years: 40.00 Pack years: 20.00 Last attempt to quit: 2019 Years since quittin.4  Smokeless tobacco: Never Used Substance and Sexual Activity  Alcohol use: Not Currently Comment: chronic past abuse  Drug use: Never  Sexual activity: Not Currently Lifestyle  Physical activity:  
  Days per week: Patient refused Minutes per session: Patient refused  Stress: Rather much Relationships  Social connections:  
  Talks on phone: Patient refused Gets together: Patient refused Attends Judaism service: Patient refused Active member of club or organization: Patient refused Attends meetings of clubs or organizations: Patient refused Relationship status: Patient refused  Intimate partner violence:  
  Fear of current or ex partner: Patient refused Emotionally abused: Patient refused Physically abused: Patient refused Forced sexual activity: Patient refused Other Topics Concern   Service No  
 Blood Transfusions Not Asked  Caffeine Concern No  
 Occupational Exposure Not Asked Michail Hue Hazards Not Asked  Sleep Concern Not Asked  Stress Concern Yes  Weight Concern Not Asked  Special Diet Not Asked  Back Care Not Asked  Exercise Not Asked  Bike Helmet Not Asked  Seat Belt Not Asked  Self-Exams Not Asked Social History Narrative Is significant medical support from his sister,, who had previously volunteered with [facility]  
 
 
 
ALLERGIES: Patient has no known allergies. Review of Systems Constitutional: Positive for activity change and fatigue. Respiratory: Positive for shortness of breath. Cardiovascular: Negative for chest pain. Gastrointestinal: Negative for abdominal pain. Skin: Positive for color change and wound. Neurological: Negative for headaches. Psychiatric/Behavioral: Positive for agitation, confusion and decreased concentration. All other systems reviewed and are negative. Vitals: 09/25/19 1425 09/25/19 1502 09/25/19 1510 09/25/19 1511 BP:   152/82 Pulse:      
Resp:      
Temp:      
SpO2: 99% 99% 100% 100% Weight:      
Height:      
      
 
Physical Exam  
Constitutional: He is oriented to person, place, and time. Frail-appearing HENT:  
Head: Normocephalic and atraumatic. Eyes: Pupils are equal, round, and reactive to light. Conjunctivae and EOM are normal.  
Neck: Normal range of motion. Neck supple. Cardiovascular: Normal rate, regular rhythm, normal heart sounds and intact distal pulses. Pulmonary/Chest: No respiratory distress. He has wheezes. Abdominal: Soft. Bowel sounds are normal.  
Genitourinary: Penis normal.  
Genitourinary Comments: Castillo in place Musculoskeletal: He exhibits no deformity. Erythema predominantly in the right lower extremity extending from the need to the foot, some tenderness, some swelling, 4 x 4 abscess on the right medial knee. Several smaller follicular looking areas on the left upper extremity and lower extremity but nothing that appears abscessed Neurological: He is alert and oriented to person, place, and time. No cranial nerve deficit. Skin: Skin is warm and dry. Psychiatric: He has a normal mood and affect. His behavior is normal.  
Nursing note and vitals reviewed. MDM Number of Diagnoses or Management Options Cellulitis and abscess of leg, except foot:  
Chronic respiratory failure with hypoxia [facility]):  
Diagnosis management comments: 68-year-old chronically ill male presenting for what appears to be an abscess and cellulitis of the extremity. We will perform sepsis labs, chest x-ray, urinalysis and start antibiotics to cover for MRSA and/or strep infection. Amount and/or Complexity of Data Reviewed Clinical lab tests: ordered and reviewed (Results for orders placed or performed during the hospital encounter of 09/25/19 
-CBC WITH AUTOMATED DIFF 
 Result                      Value             Ref Range WBC                         18.2 (H)          4.3 - 11.1 K* 
     RBC                         4.65              4.23 - 5.6 M* HGB                         13.3 (L)          13.6 - 17.2 * HCT                         44.2              41.1 - 50.3 % MCV                         95.1              79.6 - 97.8 * MCH                         28.6              26.1 - 32.9 * MCHC                        30.1 (L)          31.4 - 35.0 * RDW                         13.9              11.9 - 14.6 % PLATELET                    314               150 - 450 K/* MPV                         10.0              9.4 - 12.3 FL ABSOLUTE NRBC               0.00              0.0 - 0.2 K/* DF                          AUTOMATED NEUTROPHILS                 93 (H)            43 - 78 % LYMPHOCYTES                 2 (L)             13 - 44 % MONOCYTES                   4                 4.0 - 12.0 % EOSINOPHILS                 0 (L)             0.5 - 7.8 % BASOPHILS                   0                 0.0 - 2.0 % IMMATURE GRANULOCYTES       1                 0.0 - 5.0 %   
     ABS. NEUTROPHILS            17.0 (H)          1.7 - 8.2 K/* ABS. LYMPHOCYTES            0.3 (L)           0.5 - 4.6 K/* ABS. MONOCYTES              0.8               0.1 - 1.3 K/* ABS. EOSINOPHILS            0.0               0.0 - 0.8 K/* ABS. BASOPHILS              0.0               0.0 - 0.2 K/* ABS. IMM. GRANS.            0.1               0.0 - 0.5 K/* 
-METABOLIC PANEL, COMPREHENSIVE Result                      Value             Ref Range Sodium                      139               136 - 145 mm* Potassium                   4.1               3.5 - 5.1 mm*      Chloride                    99                98 - 107 mmo* 
 CO2                         37 (H)            21 - 32 mmol* Anion gap                   3 (L)             7 - 16 mmol/L Glucose                     132 (H)           65 - 100 mg/* BUN                         23                8 - 23 MG/DL Creatinine                  1.15              0.8 - 1.5 MG* 
     GFR est AA                  >60               >60 ml/min/1* GFR est non-AA              >60               >60 ml/min/1* Calcium                     9.6               8.3 - 10.4 M* Bilirubin, total            0.4               0.2 - 1.1 MG* ALT (SGPT)                  18                12 - 65 U/L   
     AST (SGOT)                  20                15 - 37 U/L Alk. phosphatase            114               50 - 136 U/L Protein, total              7.9               6.3 - 8.2 g/* Albumin                     3.0 (L)           3.2 - 4.6 g/* Globulin                    4.9 (H)           2.3 - 3.5 g/* A-G Ratio                   0.6 (L)           1.2 - 3.5     
-POC LACTIC ACID Result                      Value             Ref Range Lactic Acid (POC)           0.99              0.5 - 1.9 mm* 
-POC G3 Result                      Value             Ref Range Device:                     NASAL CANNULA pH (POC)                    7.345 (L)         7.35 - 7.45   
     pCO2 (POC)                  64.3 (HH)         35 - 45 MMHG  
     pO2 (POC)                   98                75 - 100 MMHG 
     HCO3 (POC)                  35.1 (H)          22 - 26 MMOL* 
     sO2 (POC)                   97                95 - 98 % Base excess (POC)           7                 mmol/L Allens test (POC)           YES Site                        LEFT RADIAL Patient temp. 98.6 Specimen type (POC)         ARTERIAL Performed by                                                
 [place] 
     CO2, POC                    37                MMOL/L Flow rate (POC)             3.500             L/min Critical value read ba*     00:01 COLLECT TIME                1,530                           
) 
Tests in the radiology section of CPT®: ordered and reviewed (Ct Chest Wo Cont Result Date: 9/25/2019 CT CHEST INDICATION: Weakness, bullous change versus pneumothorax noted on chest x-ray Multiple axial images were obtained through the chest without IV contrast. Radiation dose reduction techniques were used for this study: All CT scans performed at this facility use one or all of the following: Automated exposure control, adjustment of the mA and/or kVp according to patient's size, iterative reconstruction. COMPARISON: Earlier chest x-ray FINDINGS: -LUNGS: There is bullous change in both lung bases, left more than right. This accounts for the area of lucency seen on the chest x-ray. There is no pneumothorax. There is an area of linear density in the left upper lobe adjacent to the fissure, most likely scarring or atelectasis. -MEDIASTINUM/AXILLA: No significant adenopathy. -HEART/VESSELS: Normal. -CHEST WALL/BONES: Bones are osteopenic. No fractures. -UPPER ABDOMEN: No acute findings. IMPRESSION: 1.  Bullous change in both lung bases, left greater than right. 2.  Linear scarring/atelectasis in the left upper lobe adjacent to the fissure. Xr Chest [facility] Result Date: 9/25/2019 Chest X-ray INDICATION: Altered mental status A portable AP view of the chest was obtained. FINDINGS: There is hyperlucency of the left lower chest.  Right lung is clear. The heart size is normal.  The bony thorax is intact.    
 
IMPRESSION: Hyperlucent left lower chest, bullous change versus loculated pneumothorax. ) Tests in the medicine section of CPT®: ordered and reviewed Discuss the patient with other providers: yes (Discussed with hospitalist will assume care) Independent visualization of images, tracings, or specimens: yes Risk of Complications, Morbidity, and/or Mortality Presenting problems: moderate Diagnostic procedures: high Management options: high General comments: I personally reviewed the patient's vital signs, laboratory tests, and/or radiological findings. I discussed these findings with the patient and their significance. I answered all questions and explained that given these findings there is significant concern for increased morbidity and/or mortality without immediate intervention. As a result, I recommended admission to the hospital, consulted the appropriate service, and transitioned care to that service in improved condition Patient Progress Patient progress: stable ED Course as of Sep 25 1720 Wed Sep 25, 2019  
1511 Wbc count significantly elevated [JS] 46 Performed a needle aspiration of the blister and all I pulled out was some blood. [JS] ED Course User Index [JS] Mary Cancino MD  
 
 
Procedures

## 2019-09-25 NOTE — PROGRESS NOTES
Vancomycin Consult MD ordering: Leana RODRIGUEZ following? no Indication: SSTI 
DOT:  7  days Goal level(s): 10 - 20 Ht Readings from Last 1 Encounters:  
19 5' 8\" (1.727 m) Wt Readings from Last 1 Encounters:  
19 72.6 kg (160 lb) Temp (24hrs), Av.1 °F (36.7 °C), Min:98.1 °F (36.7 °C), Max:98.1 °F (36.7 °C) Dosing weight: 72 kg 
68 y.o. Date:  Dose/Freq Admin Times Level/Time:  
 1750 mg IV LD 1645  1000 mg IV q18 hours (11) Recent Labs  
  19 
1435 19 
1427 BUN  --  23  
CREA  --  1.15 WBC  --  18.2* LACPOC 0.99  --   
 
Estimated Creatinine Clearance: 57 mL/min (based on SCr of 1.15 mg/dL). No results found for: Oskar Hanover Day 1 Assessment and Plan: Will continue Vancomycin 1 gram IV every 18 hours. Clinical pharmacist will monitor closely.  
 
Jamee Mccain, PharmD, BCPS

## 2019-09-25 NOTE — ED NOTES
TRANSFER - OUT REPORT: 
 
Verbal report given to [facility] on [facility].  being transferred to [address] [place] [address] for routine progression of care Report consisted of patients Situation, Background, Assessment and  
Recommendations(SBAR). Information from the following report(s) SBAR and MAR was reviewed with the receiving nurse. Lines:  
Peripheral IV 09/25/19 Right Antecubital (Active) Opportunity for questions and clarification was provided. Patient transported with: 
 O2 @ 4 liters

## 2019-09-25 NOTE — H&P
Hospitalist H&P/Consult Note Admit Date:  2019  2:20 PM  
Name:  Gia Castañeda. Age:  68 y.o. 
:  1950 MRN:  [id number] PCP:  Chet Lobato MD 
Treatment Team: Attending Provider: Amari Biggs MD; Primary Nurse: Tone Christiansen RN 
 
HPI:  
68years old M with PMH of severe COPD on 3L O2/ hospice, anxiety, pneumothorax, Polio presented to the hospital complaining of R leg tenderness for almost 1 week. Patient reported 1 week ago he had a pimple around his R knee that was worsening everyday. Patient also had redness and tenderness around the area. Patient was prescribed bactrim by hospice team with no improvement of the symptoms. Patient denies fever or history of blood clots. Patient reported a previous skin infection with MRSA. Sister reported he has severe COPD and is currently on hospice as his pulmonary physician told him he likely has 6 more months to live. In the ED patient had elevated WBC to 18. I discussed with Dr. Brenna Metz who aspirated lesion on L knee with findings of serosanguineous fluids. 10 systems reviewed and negative except as noted in HPI. Past Medical History:  
Diagnosis Date  Ill-defined condition No past surgical history on file. Prior to Admission Medications Prescriptions Last Dose Informant Patient Reported? Taking? OXYGEN-AIR DELIVERY SYSTEMS   Yes No  
Si L/min by Nasal route continuous. no humidity  
acetaminophen (TYLENOL) 325 mg tablet   Yes No  
Sig: Take 650 mg by mouth every four to six (4-6) hours as needed. albuterol-ipratropium (DUO-NEB) 2.5 mg-0.5 mg/3 ml nebu   Yes No  
Sig: 3 mL by Nebulization route three (3) times daily. albuterol-ipratropium (DUO-NEB) 2.5 mg-0.5 mg/3 ml nebu   Yes No  
Sig: 3 mL by Nebulization route three (3) times daily as needed for Other (dyspnea/wheezing). clonazePAM (KLONOPIN) 0.5 mg tablet   Yes No  
Sig: Take 0.5 mg by mouth two (2) times a day. clonazePAM (KLONOPIN) 1 mg tablet   Yes No  
Sig: Take 1 mg by mouth nightly as needed (anxiety, sleep). clonazePAM (KLONOPIN) 1 mg tablet   Yes No  
Sig: Take 1 mg by mouth three (3) times daily. Indications: Anxiety, Agitation  
fluticasone propion-salmeterol (ADVAIR/WIXELA) 250-50 mcg/dose diskus inhaler   Yes No  
Sig: Take 1 Puff by inhalation two (2) times a day. ibuprofen (MOTRIN) 200 mg tablet   Yes No  
Sig: Take 200 mg by mouth every four (4) hours as needed for Pain. pramipexole (MIRAPEX) 1 mg tablet   Yes No  
Sig: Take 1 mg by mouth three (3) times daily. 19---states going to take regardless of orders  
pramipexole (MIRAPEX) 1 mg tablet   Yes No  
Sig: Take 1 mg by mouth nightly as needed (restless legs). predniSONE (DELTASONE) 10 mg tablet   Yes No  
Sig: Take 10 mg by mouth daily. Indications: Dyspnea  
predniSONE (DELTASONE) 5 mg tablet   Yes No  
Sig: Take 10 mg by mouth daily. Take 10 mg ( two 5 mg tabs) once daily in am ( with breakfast)   Indications: SOB  
raNITIdine (ZANTAC) 15 mg/mL syrup   Yes No  
Sig: Take 150 mg by mouth two (2) times a day. raNITIdine (ZANTAC) 150 mg tablet   Yes No  
Sig: Take 150 mg by mouth two (2) times a day. Indications: heartburn  
senna (SENNA) 8.6 mg tablet   Yes No  
Sig: Take 1 Tab by mouth two (2) times a day. sertraline (ZOLOFT) 100 mg tablet   Yes No  
Sig: Take 100 mg by mouth daily. traZODone (DESYREL) 150 mg tablet   Yes No  
Sig: Take 150 mg by mouth nightly. Rosaline Rutherford sends a 150mg tablet Facility-Administered Medications: None No Known Allergies Social History Tobacco Use  Smoking status: Former Smoker Packs/day: 0.50 Years: 40.00 Pack years: 20.00 Last attempt to quit: 2019 Years since quittin.4  Smokeless tobacco: Never Used Substance Use Topics  Alcohol use: Not Currently Comment: chronic past abuse No family history on file. There is no immunization history on file for this patient. Objective:  
 
Patient Vitals for the past 24 hrs: 
 Temp Pulse Resp BP SpO2  
09/25/19 1720    134/88 100 % 09/25/19 1700    133/66 100 % 09/25/19 1600    130/69 98 %  
09/25/19 1540    139/75 98 %  
09/25/19 1520    130/66 99 % 09/25/19 1511     100 % 09/25/19 1510    152/82 100 % 09/25/19 1502     99 % 09/25/19 1425     99 % 09/25/19 1419 98.1 °F (36.7 °C) 90 18 132/64 99 % Oxygen Therapy O2 Sat (%): 100 % (09/25/19 1720) Pulse via Oximetry: 80 beats per minute (09/25/19 1720) O2 Device: Nasal cannula (09/25/19 1425) O2 Flow Rate (L/min): 4 l/min (09/25/19 1425) No intake or output data in the 24 hours ending 09/25/19 1742 Physical Exam: 
General:    Well nourished. Alert. ENT:  Normocephalic, atraumatic. Moist mucous membranes CV:   RRR. No murmur, rub, or gallop. Lungs:  Diminished breath sounds b/l. Minimal expiratory wheezing b/l. No rhonchi Abdomen: Soft, nontender, nondistended. Bowel sounds normal.  
Extremities: Warm and dry. No cyanosis or edema. Neurologic: CN II-XII grossly intact. No gross focal deficits Skin:     R leg with abscess on medial knee with no fluctuace. Erythema all the way down to his R foot. Right knee with full range of motion Psych:  Normal mood and affect. I reviewed the labs and other studies done this admission. Data Review:  
Recent Results (from the past 24 hour(s)) CBC WITH AUTOMATED DIFF Collection Time: 09/25/19  2:27 PM  
Result Value Ref Range WBC 18.2 (H) 4.3 - 11.1 K/uL  
 RBC 4.65 4.23 - 5.6 M/uL  
 HGB 13.3 (L) 13.6 - 17.2 g/dL HCT 44.2 41.1 - 50.3 % MCV 95.1 79.6 - 97.8 FL  
 MCH 28.6 26.1 - 32.9 PG  
 MCHC 30.1 (L) 31.4 - 35.0 g/dL  
 RDW 13.9 11.9 - 14.6 % PLATELET 945 334 - 513 K/uL MPV 10.0 9.4 - 12.3 FL ABSOLUTE NRBC 0.00 0.0 - 0.2 K/uL  
 DF AUTOMATED NEUTROPHILS 93 (H) 43 - 78 % LYMPHOCYTES 2 (L) 13 - 44 % MONOCYTES 4 4.0 - 12.0 % EOSINOPHILS 0 (L) 0.5 - 7.8 % BASOPHILS 0 0.0 - 2.0 % IMMATURE GRANULOCYTES 1 0.0 - 5.0 %  
 ABS. NEUTROPHILS 17.0 (H) 1.7 - 8.2 K/UL  
 ABS. LYMPHOCYTES 0.3 (L) 0.5 - 4.6 K/UL  
 ABS. MONOCYTES 0.8 0.1 - 1.3 K/UL  
 ABS. EOSINOPHILS 0.0 0.0 - 0.8 K/UL  
 ABS. BASOPHILS 0.0 0.0 - 0.2 K/UL  
 ABS. IMM. GRANS. 0.1 0.0 - 0.5 K/UL METABOLIC PANEL, COMPREHENSIVE Collection Time: 09/25/19  2:27 PM  
Result Value Ref Range Sodium 139 136 - 145 mmol/L Potassium 4.1 3.5 - 5.1 mmol/L Chloride 99 98 - 107 mmol/L  
 CO2 37 (H) 21 - 32 mmol/L Anion gap 3 (L) 7 - 16 mmol/L Glucose 132 (H) 65 - 100 mg/dL BUN 23 8 - 23 MG/DL Creatinine 1.15 0.8 - 1.5 MG/DL  
 GFR est AA >60 >60 ml/min/1.73m2 GFR est non-AA >60 >60 ml/min/1.73m2 Calcium 9.6 8.3 - 10.4 MG/DL Bilirubin, total 0.4 0.2 - 1.1 MG/DL  
 ALT (SGPT) 18 12 - 65 U/L  
 AST (SGOT) 20 15 - 37 U/L Alk. phosphatase 114 50 - 136 U/L Protein, total 7.9 6.3 - 8.2 g/dL Albumin 3.0 (L) 3.2 - 4.6 g/dL Globulin 4.9 (H) 2.3 - 3.5 g/dL A-G Ratio 0.6 (L) 1.2 - 3.5 POC LACTIC ACID Collection Time: 09/25/19  2:35 PM  
Result Value Ref Range Lactic Acid (POC) 0.99 0.5 - 1.9 mmol/L  
POC G3 Collection Time: 09/25/19  3:37 PM  
Result Value Ref Range Device: NASAL CANNULA pH (POC) 7.345 (L) 7.35 - 7.45    
 pCO2 (POC) 64.3 (HH) 35 - 45 MMHG  
 pO2 (POC) 98 75 - 100 MMHG  
 HCO3 (POC) 35.1 (H) 22 - 26 MMOL/L  
 sO2 (POC) 97 95 - 98 % Base excess (POC) 7 mmol/L Allens test (POC) YES Site LEFT RADIAL Patient temp. 98.6 Specimen type (POC) ARTERIAL Performed by [facility]   
 CO2, POC 37 MMOL/L Flow rate (POC) 3.500 L/min Critical value read back 00:01 COLLECT TIME 1,530 URINALYSIS W/ RFLX MICROSCOPIC Collection Time: 09/25/19  4:46 PM  
Result Value Ref Range Color YELLOW Appearance CLOUDY Specific gravity 1.031 (H) 1.001 - 1.023    
 pH (UA) 5.0 5.0 - 9.0 Protein TRACE (A) NEG mg/dL Glucose NEGATIVE  mg/dL Ketone NEGATIVE  NEG mg/dL Bilirubin NEGATIVE  NEG Blood SMALL (A) NEG Urobilinogen 0.2 0.2 - 1.0 EU/dL Nitrites POSITIVE (A) NEG Leukocyte Esterase MODERATE (A) NEG    
 WBC  0 /hpf  
 RBC 5-10 0 /hpf Epithelial cells 0 0 /hpf Bacteria TRACE 0 /hpf Casts 3-5 0 /lpf Imaging Studies: CXR Results  (Last 48 hours) 09/25/19 1530  XR CHEST PORT Final result Impression:  IMPRESSION: Hyperlucent left lower chest, bullous change versus loculated  
pneumothorax. Narrative:  Chest X-ray INDICATION: Altered mental status A portable AP view of the chest was obtained. FINDINGS: There is hyperlucency of the left lower chest.  Right lung is clear. The heart size is normal.  The bony thorax is intact. CT Results  (Last 48 hours) 09/25/19 1629  CT CHEST WO CONT Final result Impression:  IMPRESSION:   
1.  Bullous change in both lung bases, left greater than right. 2.  Linear scarring/atelectasis in the left upper lobe adjacent to the fissure. Narrative:  CT CHEST INDICATION: Weakness, bullous change versus pneumothorax noted on chest x-ray Multiple axial images were obtained through the chest without IV contrast.   
Radiation dose reduction techniques were used for this study: All CT scans  
performed at this facility use one or all of the following: Automated exposure  
control, adjustment of the mA and/or kVp according to patient's size, iterative  
reconstruction. COMPARISON: Earlier chest x-ray FINDINGS:  
-LUNGS: There is bullous change in both lung bases, left more than right. This  
accounts for the area of lucency seen on the chest x-ray. There is no  
pneumothorax. There is an area of linear density in the left upper lobe adjacent to the fissure, most likely scarring or atelectasis. -MEDIASTINUM/AXILLA: No significant adenopathy. -HEART/VESSELS: Normal.  
-CHEST WALL/BONES: Bones are osteopenic. No fractures. -UPPER ABDOMEN: No acute findings. Assessment and Plan:  
 
Hospital Problems as of 9/25/2019 Never Reviewed Codes Class Noted - Resolved POA * (Principal) Cellulitis of right leg ICD-10-CM: L03.115 ICD-9-CM: 682.6  9/25/2019 - Present Unknown History of pneumothorax ICD-10-CM: Z87.09 
ICD-9-CM: V12.69  6/19/2019 - Present Yes Overview Signed 6/19/2019 11:31 AM by Anali Petersen NP Last Assessment & Plan:  
History of pneumothorax with lobectomy in the past on the right side Polio ICD-10-CM: A80.9 ICD-9-CM: 045.90  6/19/2019 - Present Yes Chronic hypoxemic respiratory failure [facility]) ICD-10-CM: J96.11 
ICD-9-CM: 518.83, 799.02  1/29/2019 - Present Yes Overview Signed 6/19/2019 11:31 AM by Anali Petersen NP Last Assessment & Plan:  
Continue with supplemental oxygen Debility ICD-10-CM: R53.81 ICD-9-CM: 799.3  1/16/2019 - Present Yes Overview Signed 6/19/2019 11:31 AM by Anali Petersen NP Last Assessment & Plan: Supportive care, getting home care, monitor. AP: 
 
-Celullitis L leg Patient has an abscess on medial R knee Elevated WBC. Patient is afebrile and HR on normal range Hx of MRSA infection Plan Start vanco and ceftriaxone Follow up cultures Doubt septic arthritis as patient has full range of motion of R knee Get MRI R knee I will ask Ortho to evaluate R knee due to proximity with R knee joint -?UTI WBC  with positive nitrate 
ucx pending Cont abxs above 
 
-Severe COPD No active wheezing Restart home inhalers and O2 therapy Patient to restart home hospice upon discharge 
 
-Debility Patient reported he is a rollator at home but I using his wheelchair for last week due to infection of R leg Will ask PT /OT to evaluate -DVT ppx: heparin Code status: DNR Signed: Alena Singh MD

## 2019-09-26 NOTE — CONSULTS
[facility] CONSULTATION Name:  Daryl Chase 
MR#:  [id number] :  1950 ACCOUNT #:  [de-identified] DATE OF SERVICE:  2019 ORTHOPEDIC CONSULTATION 
 
REASON FOR CONSULT:  Right knee wound. HISTORY OF PRESENT ILLNESS:  I was asked to see this pleasant 78-year-old gentleman for evaluation of a right knee problem. He was admitted to the hospitalist service yesterday for right leg tenderness. The patient has postpolio syndrome and has difficulty ambulating. He spent some of his time on wheelchair, but walks some with a rolling walker. He had three falls about a week ago. He and his wife state that he had a bump around the right side of his knee that became worse every day. He was put on Bactrim by his hospice team without improvement. He has a history of previous MRSA infection. He has severe COPD and is currently on hospice. He was told that he has about 6 months to live by his pulmonologist.  His white blood cell count was 18 in the emergency room. According to his family members, some fluid was drawn from the sore on his knee, but a needle was not stuck into the knee itself. He is currently on Rocephin and vancomycin. I was consulted to evaluate for the possibility of septic arthritis. He has an MRI of the knee pending at the time of this dictation. Past medical history, past surgical history, medications, allergies, family history, social history, and review of systems are reviewed on the patient's admission history and physical from the hospitalist service. PHYSICAL EXAMINATION: 
GENERAL:  Demonstrates a chronically ill-appearing elderly gentleman on oxygen. EXTREMITIES:  His bilateral lower extremities demonstrate atrophy, which appears to be chronic. He has a complex skin lesion of the medial knee that does not appear to communicate with his deep knee. He does not have knee effusion. I do not see any evidence of knee arthrocentesis.   No proximal streaking, but the lesion about the medial knee, which several centimeters in diameter, is very darkly discolored. LABORATORY DATA:  MRI of the knee is ordered at this point, but not yet resulted. IMPRESSION:  Right knee skin lesion with likely infection. PLAN:  I do not think he has a septic arthritis given lack of knee effusion. It does not sound like the aspiration that was performed was an aspiration of the knee itself rather of the lesion. I will await the results of the MRI, which hopefully will support this thought process. I agree with continuing the IV antibiotics. Alessio Siu MD 
 
 
[id number] 
D:  09/26/2019 12:57 
T:  09/26/2019 14:48 JOB #:  [id number]

## 2019-09-26 NOTE — PROGRESS NOTES
Dr. Lauren Curiel contacted because pt continues to have bronchospasms, in distress, currently on 7L high flow to keep SAT at 90%. New order received Morphine 1 mg IV X 1 now.

## 2019-09-26 NOTE — PROGRESS NOTES
Pt in respiratory distress, respiratory contacted and O2 increased to get pt to sat 90%. Dr. Garry Gama contacted and new orders received- Hycodan 5-1.5mg/5ml po X 1 dose now & Morphine 0.5mg IV X 1 dose now. Assessment and Plan of Care:     [x ] Normal / Healthy   [ ] GBS Protocol  [ ] Hypoglycemia Protocol for SGA / LGA / IDM / Premature Infant  [ ] Other:     Family Discussion:   [x ]Feeding and baby weight loss were discussed today. Parent questions were answered  [ ]Other items discussed:   [ ]Unable to speak with family today due to maternal condition

## 2019-09-26 NOTE — PROGRESS NOTES
Lab called with Gram positive rods in the aerobic bottle. Dr. Eddie Rosas notified and no new orders received at this time.

## 2019-09-26 NOTE — PROGRESS NOTES
Late entry due to hands on patient care. Called by Laquita Doyle RN due to pts. Desaturation. Pt. Had o2 increased to 15lpm . Pt. Placed on Airvo per Dr. Ashley Grande.

## 2019-09-26 NOTE — PROGRESS NOTES
Am  Assessment completed. Pt is alert and oriented. Verbalizes needs well. Pt's right leg from knee down is red - has sore to side of knee which is draining. excoriation to rectal/perirectal region. Has several open wounds which are draining and some boil-like sores still intact. Pt has a difficult time breathing so has to have high flow oxygen 40 %, safety measures in place. continue to monitor.

## 2019-09-26 NOTE — PROGRESS NOTES
Patient c/o of shortness of breath. RR 28, SpO2 67% on 2 LPM via NC. HOB elevated and oxygen increased to 5 LPM via NC. Patient instructed on pursed lip breathing. SpO2 increased to 90%. RT Kyra at bedside.

## 2019-09-26 NOTE — PROGRESS NOTES
Shift assessment complete. A&OX4. Lungs have coarse sounds and are diminished in the bases. Respirations present. Even and unlabored. No s/s of distress. No c/o pain at this time. Call light within reach. Encouraged patient to call for assistance. Patient verbalizes understanding. See Doc Flowsheet for assessment details. Patient resting in bed. Bed alarm in progress. Door open for observation.

## 2019-09-26 NOTE — PROGRESS NOTES
Interdisciplinary team rounds were held 9/26/2019 with the following team members:Care Management, Nursing, Nutrition, Pharmacy, Physical Therapy and Physician. Plan of care discussed. See clinical pathway and/or care plan for interventions and desired outcomes.

## 2019-09-26 NOTE — PROGRESS NOTES
Hospitalist Progress Note 2019 Admit Date: 2019  2:20 PM  
NAME: Katherine Robles :  1950 DOS:              19 MRN:  [id number] Attending: Elisabeth Peace MD 
PCP:  Melanie Crocker MD 
Treatment Team: Attending Provider: Angela Robles MD; Consulting Provider: Regina Castillo MD; Care Manager: Balbir Rutherford RN 
 
DNR  
 
SUBJECTIVE: As previously documented: 68years old M with PMH of severe COPD on 3L O2/ hospice, anxiety, pneumothorax, Polio presented to the hospital complaining of R leg tenderness for almost 1 week. In the ED patient had elevated WBC to 18 and findings of cellulitis of R leg. Ortho consulted to evaluate for joint involvement. 19    Katherine Robles reported he was having SOB overnight but now he is feeling better. 10+ ROS reviewed and negative except for positive in HPI. No Known Allergies Current Facility-Administered Medications Medication Dose Route Frequency  tuberculin injection 5 Units  5 Units IntraDERMal ONCE  
 sodium chloride (NS) flush 5-40 mL  5-40 mL IntraVENous Q8H  
 sodium chloride (NS) flush 5-40 mL  5-40 mL IntraVENous PRN  
 cefTRIAXone (ROCEPHIN) 1 g in 0.9% sodium chloride (MBP/ADV) 50 mL  1 g IntraVENous Q24H  
 heparin (porcine) injection 5,000 Units  5,000 Units SubCUTAneous Q8H  
 acetaminophen (TYLENOL) tablet 650 mg  650 mg Oral Q4H PRN  
 clonazePAM (KlonoPIN) tablet 1 mg  1 mg Oral TID  pramipexole (MIRAPEX) tablet 1 mg  1 mg Oral QHS  predniSONE (DELTASONE) tablet 20 mg  20 mg Oral DAILY  traZODone (DESYREL) tablet 150 mg  150 mg Oral QHS  sertraline (ZOLOFT) tablet 100 mg  100 mg Oral DAILY  albuterol-ipratropium (DUO-NEB) 2.5 MG-0.5 MG/3 ML  3 mL Nebulization TID PRN  
 budesonide (PULMICORT) 500 mcg/2 ml nebulizer suspension  500 mcg Nebulization BID RT  
 vancomycin (VANCOCIN) 1,000 mg in 0.9% sodium chloride (MBP/ADV) 250 mL 1 g IntraVENous Q18H  
 senna (SENOKOT) tablet 8.6 mg  1 Tab Oral DAILY  famotidine (PEPCID) tablet 20 mg  20 mg Oral DAILY  benzonatate (TESSALON) capsule 200 mg  200 mg Oral TID Immunization History Administered Date(s) Administered  TB Skin Test (PPD) Intradermal 2019 Objective:  
 
Patient Vitals for the past 24 hrs: 
 Temp Pulse Resp BP SpO2  
19 0804     94 % 19 0800 98.8 °F (37.1 °C) 95 20 152/80 94 % 19 0407     91 %  
19 0335 98.4 °F (36.9 °C) 92 18 132/80 96 %  
19 2358 98.6 °F (37 °C) 95 18 134/82 96 %  
19 2334     97 % 19     98 %  
19 1940 97.1 °F (36.2 °C) 88 16 136/89 98 %  
19 1813 96.5 °F (35.8 °C) 86 16 147/89 98 %  
19 1720    134/88 100 % 19 1700    133/66 100 % 19 1600    130/69 98 %  
19 1540    139/75 98 %  
19 1520    130/66 99 % 19 1511     100 % 19 1510    152/82 100 % 19 1502     99 % 19 1425     99 % 19 1419 98.1 °F (36.7 °C) 90 18 132/64 99 % Temp (24hrs), Av.9 °F (36.6 °C), Min:96.5 °F (35.8 °C), Max:98.8 °F (37.1 °C) Oxygen Therapy O2 Sat (%): 94 % (19) Pulse via Oximetry: 110 beats per minute (19) O2 Device: Heated; Hi flow nasal cannula;Humidifier (19) O2 Flow Rate (L/min): 40 l/min (19) O2 Temperature: 87.8 °F (31 °C) (19) FIO2 (%): 40 % (19) Oxygen Therapy O2 Sat (%): 94 % (19) Pulse via Oximetry: 110 beats per minute (19) O2 Device: Heated; Hi flow nasal cannula;Humidifier (19) O2 Flow Rate (L/min): 40 l/min (19) O2 Temperature: 87.8 °F (31 °C) (19) FIO2 (%): 40 % (19) Physical Exam: 
 
General:          Well nourished. Alert. ENT:                  Moist mucous membranes CV: RRR. No murmur, rub, or gallop. Lungs:             Diminished breath sounds b/l. Minimal expiratory wheezing b/l. No rhonchi Abdomen:        Soft, nontender, nondistended. Bowel sounds normal.  
Extremities:     Warm and dry. No cyanosis or edema. Neurologic:       No gross focal deficits Skin:                R leg with abscess on medial knee with no fluctuance. Erythema resolving. DIAGNOSTIC STUDIES Data Review:  
Recent Results (from the past 24 hour(s)) CBC WITH AUTOMATED DIFF Collection Time: 09/25/19  2:27 PM  
Result Value Ref Range WBC 18.2 (H) 4.3 - 11.1 K/uL  
 RBC 4.65 4.23 - 5.6 M/uL  
 HGB 13.3 (L) 13.6 - 17.2 g/dL HCT 44.2 41.1 - 50.3 % MCV 95.1 79.6 - 97.8 FL  
 MCH 28.6 26.1 - 32.9 PG  
 MCHC 30.1 (L) 31.4 - 35.0 g/dL  
 RDW 13.9 11.9 - 14.6 % PLATELET 605 400 - 788 K/uL MPV 10.0 9.4 - 12.3 FL ABSOLUTE NRBC 0.00 0.0 - 0.2 K/uL  
 DF AUTOMATED NEUTROPHILS 93 (H) 43 - 78 % LYMPHOCYTES 2 (L) 13 - 44 % MONOCYTES 4 4.0 - 12.0 % EOSINOPHILS 0 (L) 0.5 - 7.8 % BASOPHILS 0 0.0 - 2.0 % IMMATURE GRANULOCYTES 1 0.0 - 5.0 %  
 ABS. NEUTROPHILS 17.0 (H) 1.7 - 8.2 K/UL  
 ABS. LYMPHOCYTES 0.3 (L) 0.5 - 4.6 K/UL  
 ABS. MONOCYTES 0.8 0.1 - 1.3 K/UL  
 ABS. EOSINOPHILS 0.0 0.0 - 0.8 K/UL  
 ABS. BASOPHILS 0.0 0.0 - 0.2 K/UL  
 ABS. IMM. GRANS. 0.1 0.0 - 0.5 K/UL METABOLIC PANEL, COMPREHENSIVE Collection Time: 09/25/19  2:27 PM  
Result Value Ref Range Sodium 139 136 - 145 mmol/L Potassium 4.1 3.5 - 5.1 mmol/L Chloride 99 98 - 107 mmol/L  
 CO2 37 (H) 21 - 32 mmol/L Anion gap 3 (L) 7 - 16 mmol/L Glucose 132 (H) 65 - 100 mg/dL BUN 23 8 - 23 MG/DL Creatinine 1.15 0.8 - 1.5 MG/DL  
 GFR est AA >60 >60 ml/min/1.73m2 GFR est non-AA >60 >60 ml/min/1.73m2 Calcium 9.6 8.3 - 10.4 MG/DL  Bilirubin, total 0.4 0.2 - 1.1 MG/DL  
 ALT (SGPT) 18 12 - 65 U/L  
 AST (SGOT) 20 15 - 37 U/L  
 Alk. phosphatase 114 50 - 136 U/L Protein, total 7.9 6.3 - 8.2 g/dL Albumin 3.0 (L) 3.2 - 4.6 g/dL Globulin 4.9 (H) 2.3 - 3.5 g/dL A-G Ratio 0.6 (L) 1.2 - 3.5 CULTURE, BLOOD Collection Time: 09/25/19  2:27 PM  
Result Value Ref Range Special Requests: RIGHT ANTECUBITAL Culture result: NO GROWTH AFTER 19 HOURS    
POC LACTIC ACID Collection Time: 09/25/19  2:35 PM  
Result Value Ref Range Lactic Acid (POC) 0.99 0.5 - 1.9 mmol/L  
POC G3 Collection Time: 09/25/19  3:37 PM  
Result Value Ref Range Device: NASAL CANNULA pH (POC) 7.345 (L) 7.35 - 7.45    
 pCO2 (POC) 64.3 (HH) 35 - 45 MMHG  
 pO2 (POC) 98 75 - 100 MMHG  
 HCO3 (POC) 35.1 (H) 22 - 26 MMOL/L  
 sO2 (POC) 97 95 - 98 % Base excess (POC) 7 mmol/L Allens test (POC) YES Site LEFT RADIAL Patient temp. 98.6 Specimen type (POC) ARTERIAL Performed by ReedJacqueline[facility]   
 CO2, POC 37 MMOL/L Flow rate (POC) 3.500 L/min Critical value read back 00:01 COLLECT TIME 1,530 URINALYSIS W/ RFLX MICROSCOPIC Collection Time: 09/25/19  4:46 PM  
Result Value Ref Range Color YELLOW Appearance CLOUDY Specific gravity 1.031 (H) 1.001 - 1.023    
 pH (UA) 5.0 5.0 - 9.0 Protein TRACE (A) NEG mg/dL Glucose NEGATIVE  mg/dL Ketone NEGATIVE  NEG mg/dL Bilirubin NEGATIVE  NEG Blood SMALL (A) NEG Urobilinogen 0.2 0.2 - 1.0 EU/dL Nitrites POSITIVE (A) NEG Leukocyte Esterase MODERATE (A) NEG    
 WBC  0 /hpf  
 RBC 5-10 0 /hpf Epithelial cells 0 0 /hpf Bacteria TRACE 0 /hpf Casts 3-5 0 /lpf CULTURE, URINE Collection Time: 09/25/19  4:46 PM  
Result Value Ref Range Special Requests: NO SPECIAL REQUESTS Culture result:     
  NO GROWTH AFTER SHORT PERIOD OF INCUBATION. FURTHER RESULTS TO FOLLOW AFTER OVERNIGHT INCUBATION. CULTURE, BLOOD Collection Time: 09/25/19  6:12 PM  
Result Value Ref Range Special Requests: LEFT 
HAND GRAM STAIN GRAM NEGATIVE RODS    
 GRAM STAIN AEROBIC BOTTLE POSITIVE    
 GRAM STAIN     
  RESULTS VERIFIED, PHONED TO AND READ BACK BY Tanya North RN AT 1504 09/26/2019 BY GILMARTAYLOR Culture result: CULTURE IN PROGRESS,FURTHER UPDATES TO FOLLOW METABOLIC PANEL, BASIC Collection Time: 09/26/19  4:41 AM  
Result Value Ref Range Sodium 137 136 - 145 mmol/L Potassium 4.0 3.5 - 5.1 mmol/L Chloride 102 98 - 107 mmol/L  
 CO2 36 (H) 21 - 32 mmol/L Anion gap Cannot be calculated 7 - 16 mmol/L Glucose 132 (H) 65 - 100 mg/dL BUN 24 (H) 8 - 23 MG/DL Creatinine 1.17 0.8 - 1.5 MG/DL  
 GFR est AA >60 >60 ml/min/1.73m2 GFR est non-AA >60 >60 ml/min/1.73m2 Calcium 9.1 8.3 - 10.4 MG/DL  
CBC WITH AUTOMATED DIFF Collection Time: 09/26/19  4:41 AM  
Result Value Ref Range WBC 15.3 (H) 4.3 - 11.1 K/uL  
 RBC 4.02 (L) 4.23 - 5.6 M/uL  
 HGB 11.5 (L) 13.6 - 17.2 g/dL HCT 38.8 (L) 41.1 - 50.3 % MCV 96.5 79.6 - 97.8 FL  
 MCH 28.6 26.1 - 32.9 PG  
 MCHC 29.6 (L) 31.4 - 35.0 g/dL  
 RDW 14.0 11.9 - 14.6 % PLATELET 853 447 - 686 K/uL MPV 10.0 9.4 - 12.3 FL ABSOLUTE NRBC 0.00 0.0 - 0.2 K/uL  
 DF AUTOMATED NEUTROPHILS 87 (H) 43 - 78 % LYMPHOCYTES 4 (L) 13 - 44 % MONOCYTES 8 4.0 - 12.0 % EOSINOPHILS 0 (L) 0.5 - 7.8 % BASOPHILS 0 0.0 - 2.0 % IMMATURE GRANULOCYTES 1 0.0 - 5.0 %  
 ABS. NEUTROPHILS 13.4 (H) 1.7 - 8.2 K/UL  
 ABS. LYMPHOCYTES 0.6 0.5 - 4.6 K/UL  
 ABS. MONOCYTES 1.2 0.1 - 1.3 K/UL  
 ABS. EOSINOPHILS 0.0 0.0 - 0.8 K/UL  
 ABS. BASOPHILS 0.0 0.0 - 0.2 K/UL  
 ABS. IMM. GRANS. 0.1 0.0 - 0.5 K/UL All Micro Results Procedure Component Value Units Date/Time BLOOD CULTURE ID PANEL [[id number]] Collected:  09/25/19 1812 Order Status:  Completed Updated:  09/26/19 1130 CULTURE, BLOOD [[id number]] Collected:  09/25/19 1427 Order Status:  Completed Specimen:  Blood Updated:  09/26/19 1117 Special Requests: RIGHT ANTECUBITAL Culture result: NO GROWTH AFTER 19 HOURS     
 CULTURE, URINE [[id number]] Collected:  09/25/19 1646 Order Status:  Completed Specimen:  Cath Urine Updated:  09/26/19 4703 Special Requests: NO SPECIAL REQUESTS Culture result:    
  NO GROWTH AFTER SHORT PERIOD OF INCUBATION. FURTHER RESULTS TO FOLLOW AFTER OVERNIGHT INCUBATION. CULTURE, BLOOD [[id number]] Collected:  09/25/19 1812 Order Status:  Completed Specimen:  Blood Updated:  09/26/19 0907 Special Requests: --     
  LEFT 
HAND 
  
  GRAM STAIN GRAM NEGATIVE RODS AEROBIC BOTTLE POSITIVE RESULTS VERIFIED, PHONED TO AND READ BACK BY Aminah Arzate RN AT 8822 09/26/2019 BY Bi[employer]TAYLOR Culture result:    
  CULTURE IN PROGRESS,FURTHER UPDATES TO FOLLOW Imaging Jillene Big /Studies: CXR Results  (Last 48 hours) 09/25/19 1530  XR CHEST PORT Final result Impression:  IMPRESSION: Hyperlucent left lower chest, bullous change versus loculated  
pneumothorax. Narrative:  Chest X-ray INDICATION: Altered mental status A portable AP view of the chest was obtained. FINDINGS: There is hyperlucency of the left lower chest.  Right lung is clear. The heart size is normal.  The bony thorax is intact. CT Results  (Last 48 hours) 09/25/19 1629  CT CHEST WO CONT Final result Impression:  IMPRESSION:   
1.  Bullous change in both lung bases, left greater than right. 2.  Linear scarring/atelectasis in the left upper lobe adjacent to the fissure. Narrative:  CT CHEST INDICATION: Weakness, bullous change versus pneumothorax noted on chest x-ray Multiple axial images were obtained through the chest without IV contrast.   
Radiation dose reduction techniques were used for this study: All CT scans  
performed at this facility use one or all of the following: Automated exposure control, adjustment of the mA and/or kVp according to patient's size, iterative  
reconstruction. COMPARISON: Earlier chest x-ray FINDINGS:  
-LUNGS: There is bullous change in both lung bases, left more than right. This  
accounts for the area of lucency seen on the chest x-ray. There is no  
pneumothorax. There is an area of linear density in the left upper lobe  
adjacent to the fissure, most likely scarring or atelectasis. -MEDIASTINUM/AXILLA: No significant adenopathy. -HEART/VESSELS: Normal.  
-CHEST WALL/BONES: Bones are osteopenic. No fractures. -UPPER ABDOMEN: No acute findings. Ct Chest [facility] Result Date: 9/25/2019 IMPRESSION: 1.  Bullous change in both lung bases, left greater than right. 2.  Linear scarring/atelectasis in the left upper lobe adjacent to the fissure. Xr Chest [facility] Result Date: 9/25/2019 IMPRESSION: Hyperlucent left lower chest, bullous change versus loculated pneumothorax. No results found for this visit on 09/25/19. Labs and Studies from previous 24 hours have been personally reviewed by myself   
ASSESSMENT Active Hospital Problems Diagnosis Date Noted  Cellulitis of right leg 09/25/2019  
 History of pneumothorax 06/19/2019 Last Assessment & Plan:  
History of pneumothorax with lobectomy in the past on the right side  Polio 06/19/2019  Chronic hypoxemic respiratory failure ([facility] [address] 01/29/2019 Last Assessment & Plan:  
Continue with supplemental oxygen  Debility 01/16/2019 Last Assessment & Plan: Supportive care, getting home care, monitor. Hospital Problems as of 9/26/2019 Never Reviewed Codes Class Noted - Resolved POA * (Principal) Cellulitis of right leg ICD-10-CM: L03.115 ICD-9-CM: 682.6  9/25/2019 - Present Unknown History of pneumothorax ICD-10-CM: Z87.09 
ICD-9-CM: V12.69  6/19/2019 - Present Yes Overview Signed 6/19/2019 11:31 AM by Bruce Pond NP Last Assessment & Plan:  
History of pneumothorax with lobectomy in the past on the right side Polio ICD-10-CM: A80.9 ICD-9-CM: 045.90  6/19/2019 - Present Yes Chronic hypoxemic respiratory failure [facility]) ICD-10-CM: J96.11 
ICD-9-CM: 518.83, 799.02  1/29/2019 - Present Yes Overview Signed 6/19/2019 11:31 AM by Bruce Pond NP Last Assessment & Plan:  
Continue with supplemental oxygen Debility ICD-10-CM: R53.81 ICD-9-CM: 799.3  1/16/2019 - Present Yes Overview Signed 6/19/2019 11:31 AM by Bruce Pond NP Last Assessment & Plan: Supportive care, getting home care, monitor. A/P: 
 
-Celullitis L leg Erythema resolving BC with GNR 1/2 bottle MRI R knee pending Ortho to evaluate today Cont vanco and zosyn Repeat blood cultures tomorrow AM 
  
-? UTI WBC  with positive nitrate 
ucx NG after short period Cont abxs above 
  
-Severe COPD Having increased O2 demand overnight Some wheezing physical exam, ?baseline Start scheduled nebs Wean O2 as tolerate If not improving will start IV steroids Get CXR 
 
  
-Debility Likely related to problems above PT /OT to evaluate Likely it will be discharged home with home hospice.  
  
-DVT ppx: heparin Code status: DNR 
  
  
 
Pancho Acevedo MD 
09/26/19

## 2019-09-26 NOTE — PROGRESS NOTES
Late entry due to hands on patient care. Called by RN for Pts desaturation. Sat reading 84-85 on 5lnc. Duoneb aerosol given and o2 changed to HFNC at 7 lpm   Sat at 88-89. Dr. Lauren Curiel called by RN for update

## 2019-09-26 NOTE — PROGRESS NOTES
Care Management Interventions PCP Verified by CM: Yes Mode of Transport at Discharge: BLS Transition of Care Consult (CM Consult): Home Hospice [facility]: Yes Current Support Network: Lives with Caregiver Confirm Follow Up Transport: Family Plan discussed with Pt/Family/Caregiver: Yes Freedom of Choice Offered: Yes Discharge Location Discharge Placement: Home with hospice Called pt's Simone Link #[phone], C #[phone]) who is his full time care giver. Sister states that she took her brother in after his wife walker out on him and went to live with there dtr. Pt lives with her, and he was on services with [facility] (end stage COPD) before admission and plans to return home and resume services upon d/c. Pt uses a rollator, but has fallen 3 times last week. Sister encourage the wc, pt can be stubborn at times, also has a chronic king r/t Polo as a child. Will continue to follow until d/c.

## 2019-09-26 NOTE — PROGRESS NOTES
Problem: Pressure Injury - Risk of 
Goal: *Prevention of pressure injury Description Document Panchito Scale and appropriate interventions in the flowsheet. Outcome: Progressing Towards Goal 
Note:  
Pressure Injury Interventions: 
  
 
Moisture Interventions: Absorbent underpads Problem: Patient Education: Go to Patient Education Activity Goal: Patient/Family Education Outcome: Progressing Towards Goal

## 2019-09-26 NOTE — PROGRESS NOTES
Vancomycin Consult MD ordering: Sunita RODRIGUEZ following? no Indication: SSTI 
DOT:  7  days Goal level(s): 10 - 20 Ht Readings from Last 1 Encounters:  
19 5' 8\" (1.727 m) Wt Readings from Last 1 Encounters:  
19 72.6 kg (160 lb) Temp (24hrs), Av.1 °F (36.7 °C), Min:98.1 °F (36.7 °C), Max:98.1 °F (36.7 °C) Dosing weight: 72 kg 
68 y.o. Date:  Dose/Freq Admin Times Level/Time:  
 1750 mg IV LD 1645  1000 mg IV q18 hours (11) Recent Labs  
  19 
1435 19 
1427 BUN  --  23  
CREA  --  1.15 WBC  --  18.2* LACPOC 0.99  --   
 
Estimated Creatinine Clearance: 57 mL/min (based on SCr of 1.15 mg/dL). No results found for: Lyn Galloway Day 2 Assessment and Plan: Will continue Vancomycin 1 gram IV every 18 hours. Clinical pharmacist will monitor closely.  
 
Aileen Mitchell, PharmD, BCPS

## 2019-09-27 NOTE — PROGRESS NOTES
Hospitalist Progress Note 2019 Admit Date: 2019  2:20 PM  
NAME: Adams Donovan. :  1950 DOS:              19 MRN:  [id number] Attending: Khadra James MD 
PCP:  Nga Felix MD 
Treatment Team: Attending Provider: Austin Rich MD; Consulting Provider: Oseas Craft MD; Care Manager: Chaitanya Horton RN; Utilization Review: Faizan Pettit 
 
DNR SUBJECTIVE: As previously documented: 68years old M with PMH of severe COPD on 3L O2/ hospice, anxiety, pneumothorax, Polio presented to the hospital complaining of R leg tenderness for almost 1 week. In the ED patient had elevated WBC to 18 and findings of cellulitis of R leg. Ortho reported patient does not have septic arthritis. MRI with no subcutaneous abscess. Patient having acute on chronic respi failure on airvo. 19    Adams Jimenes requested morphine PRN as help him with his breathing. Patient stated still having drainage of R medial knee abscess. 10+ ROS reviewed and negative except for positive in HPI. No Known Allergies Current Facility-Administered Medications Medication Dose Route Frequency  vancomycin (VANCOCIN) 1,000 mg in 0.9% sodium chloride (MBP/ADV) 250 mL  1 g IntraVENous Q12H  
 morphine injection 0.5 mg  0.5 mg IntraVENous Q3H PRN  
 VANCOMYCIN INFORMATION NOTE   Other ONCE  
 albuterol-ipratropium (DUO-NEB) 2.5 MG-0.5 MG/3 ML  3 mL Nebulization Q6H RT  
 influenza vaccine - (6 mos+)(PF) (FLUARIX/FLULAVAL/FLUZONE QUAD) injection 0.5 mL  0.5 mL IntraMUSCular PRIOR TO DISCHARGE  sodium chloride (NS) flush 5-40 mL  5-40 mL IntraVENous Q8H  
 sodium chloride (NS) flush 5-40 mL  5-40 mL IntraVENous PRN  
 cefTRIAXone (ROCEPHIN) 1 g in 0.9% sodium chloride (MBP/ADV) 50 mL  1 g IntraVENous Q24H  
 heparin (porcine) injection 5,000 Units  5,000 Units SubCUTAneous Q8H  
 acetaminophen (TYLENOL) tablet 650 mg  650 mg Oral Q4H PRN  
  clonazePAM (KlonoPIN) tablet 1 mg  1 mg Oral TID  pramipexole (MIRAPEX) tablet 1 mg  1 mg Oral QHS  predniSONE (DELTASONE) tablet 20 mg  20 mg Oral DAILY  traZODone (DESYREL) tablet 150 mg  150 mg Oral QHS  sertraline (ZOLOFT) tablet 100 mg  100 mg Oral DAILY  albuterol-ipratropium (DUO-NEB) 2.5 MG-0.5 MG/3 ML  3 mL Nebulization TID PRN  
 budesonide (PULMICORT) 500 mcg/2 ml nebulizer suspension  500 mcg Nebulization BID RT  
 senna (SENOKOT) tablet 8.6 mg  1 Tab Oral DAILY  famotidine (PEPCID) tablet 20 mg  20 mg Oral DAILY  benzonatate (TESSALON) capsule 200 mg  200 mg Oral TID Immunization History Administered Date(s) Administered  TB Skin Test (PPD) Intradermal 2019 Objective:  
 
Patient Vitals for the past 24 hrs: 
 Temp Pulse Resp BP SpO2  
19 1344     92 %  
19 1135 95.5 °F (35.3 °C) 81 20 120/71 90 % 19 0748 97.8 °F (36.6 °C) 77 16 113/68 97 % 19 0746     97 % 19 0407 97.6 °F (36.4 °C) 78 18 112/59 93 % 19 0127     95 % 19 2351 98.3 °F (36.8 °C) 77 18 112/62 95 % 19 1930 98.1 °F (36.7 °C) 78 20 100/57 96 %  
19 1707 98.3 °F (36.8 °C) 90 20 128/78 94 % 19 1418     95 % Temp (24hrs), Av.6 °F (36.4 °C), Min:95.5 °F (35.3 °C), Max:98.3 °F (36.8 °C) Oxygen Therapy O2 Sat (%): 92 % (19 1344) Pulse via Oximetry: 88 beats per minute (19 1344) O2 Device: Hi flow nasal cannula (09/27/19 1344) O2 Flow Rate (L/min): 5 l/min (19) O2 Temperature: 87.8 °F (31 °C) (19) FIO2 (%): 35 %(weaned from 40%) (19) Oxygen Therapy O2 Sat (%): 92 % (19) Pulse via Oximetry: 88 beats per minute (19) O2 Device: Hi flow nasal cannula (19) O2 Flow Rate (L/min): 5 l/min (19) O2 Temperature: 87.8 °F (31 °C) (19) FIO2 (%): 35 %(weaned from 40%) (19) Physical Exam: General:          Well nourished. Alert. ENT:                  Moist mucous membranes CV:                  RRR. No murmur, rub, or gallop. Lungs:             Diminished breath sounds b/l. Minimal expiratory wheezing b/l. No rhonchi Abdomen:        Soft, nontender, nondistended. Bowel sounds normal.  
Extremities:     Warm and dry. No cyanosis or edema. Neurologic:       No gross focal deficits Skin:                R leg with abscess on medial knee with no fluctuance. Erythema resolved. Abscess draining brownish fluids. Fluids drained with pressure on the skin. DIAGNOSTIC STUDIES Data Review:  
Recent Results (from the past 24 hour(s)) METABOLIC PANEL, BASIC Collection Time: 09/27/19  4:49 AM  
Result Value Ref Range Sodium 139 136 - 145 mmol/L Potassium 3.5 3.5 - 5.1 mmol/L Chloride 101 98 - 107 mmol/L  
 CO2 35 (H) 21 - 32 mmol/L Anion gap 3 (L) 7 - 16 mmol/L Glucose 141 (H) 65 - 100 mg/dL BUN 25 (H) 8 - 23 MG/DL Creatinine 1.02 0.8 - 1.5 MG/DL  
 GFR est AA >60 >60 ml/min/1.73m2 GFR est non-AA >60 >60 ml/min/1.73m2 Calcium 8.9 8.3 - 10.4 MG/DL  
CBC WITH AUTOMATED DIFF Collection Time: 09/27/19  4:49 AM  
Result Value Ref Range WBC 12.3 (H) 4.3 - 11.1 K/uL  
 RBC 3.75 (L) 4.23 - 5.6 M/uL  
 HGB 10.7 (L) 13.6 - 17.2 g/dL HCT 36.3 (L) 41.1 - 50.3 % MCV 96.8 79.6 - 97.8 FL  
 MCH 28.5 26.1 - 32.9 PG  
 MCHC 29.5 (L) 31.4 - 35.0 g/dL  
 RDW 14.1 11.9 - 14.6 % PLATELET 152 171 - 496 K/uL MPV 10.0 9.4 - 12.3 FL ABSOLUTE NRBC 0.00 0.0 - 0.2 K/uL  
 DF AUTOMATED NEUTROPHILS 85 (H) 43 - 78 % LYMPHOCYTES 7 (L) 13 - 44 % MONOCYTES 7 4.0 - 12.0 % EOSINOPHILS 0 (L) 0.5 - 7.8 % BASOPHILS 0 0.0 - 2.0 % IMMATURE GRANULOCYTES 1 0.0 - 5.0 %  
 ABS. NEUTROPHILS 10.4 (H) 1.7 - 8.2 K/UL  
 ABS. LYMPHOCYTES 0.9 0.5 - 4.6 K/UL  
 ABS. MONOCYTES 0.9 0.1 - 1.3 K/UL  
 ABS. EOSINOPHILS 0.0 0.0 - 0.8 K/UL ABS. BASOPHILS 0.0 0.0 - 0.2 K/UL  
 ABS. IMM. GRANS. 0.1 0.0 - 0.5 K/UL  
PLEASE READ & DOCUMENT PPD TEST IN 24 HRS Collection Time: 09/27/19 11:50 AM  
Result Value Ref Range PPD Negative Negative  
 mm Induration 0 0 - 5 mm All Micro Results Procedure Component Value Units Date/Time CULTURE, URINE [[id number]]  (Abnormal) Collected:  09/25/19 1646 Order Status:  Completed Specimen:  Cath Urine Updated:  09/27/19 6184 Special Requests: NO SPECIAL REQUESTS Culture result:    
  10,000 to 50,000 COLONIES/mL GRAM NEGATIVE RODS IDENTIFICATION AND SUSCEPTIBILITY TO FOLLOW CULTURE, BLOOD [[id number]] Collected:  09/25/19 1427 Order Status:  Completed Specimen:  Blood Updated:  09/27/19 1044 Special Requests: RIGHT ANTECUBITAL Culture result: NO GROWTH 2 DAYS     
 CULTURE, BLOOD [[id number]]  (Abnormal) Collected:  09/25/19 1812 Order Status:  Completed Specimen:  Blood Updated:  09/27/19 0701 Special Requests: --     
  LEFT 
HAND 
  
  GRAM STAIN GRAM NEGATIVE RODS AEROBIC BOTTLE POSITIVE RESULTS VERIFIED, PHONED TO AND READ BACK BY Oskar Boyd RN AT 1663 09/26/2019 BY HARLAN Culture result:    
  GRAM NEGATIVE RODS IDENTIFICATION AND SUSCEPTIBILITY TO FOLLOW REFER TO Zeb BRADFORD [id number] CULTURE, BLOOD [[id number]] Collected:  09/27/19 0449 Order Status:  Completed Specimen:  Blood Updated:  09/27/19 2943 CULTURE, BLOOD [[id number]] Collected:  09/27/19 0451 Order Status:  Completed Specimen:  Blood Updated:  09/27/19 2931 BLOOD CULTURE ID PANEL [[id number]]  (Abnormal) Collected:  09/25/19 1812 Order Status:  Completed Specimen:  Blood Updated:  09/26/19 1757 Acc. no. from [place] [id number] Escherichia coli DETECTED   Comment: RESULTS VERIFIED, PHONED TO AND READ BACK BY 
Cullen Siu RN @ 0754 ON 9/26/2019 BY REGLA 
  
  
  KPC (Carbapenem Resistance Gene) NOT DETECTED     
 Comment: WARNING:  A Not Detected result for the KPC gene does not indicate susceptibility to carbapenems. Gram negative bacteria can be resistant to carbapenems by mechanisms other than carrying the KPC gene. Imaging Crystal Cordia /Studies: CXR Results  (Last 48 hours) 09/26/19 1230  XR CHEST SNGL V Final result Impression:  IMPRESSION:  
1. Interval improved aeration of the left lung with stable, severe emphysematous  
changes more evident at the lung bases. Pneumothorax is not excluded on this  
supine exam.  
  
 Narrative:  PA AND LATERAL CHEST X-RAY. Clinical Indication: Shortness of breath Comparison: Chest x-ray dated 9/25/2019 Findings: Two views of the chest submitted and compared to a prior exam from the  
previous day shows improved aeration of the left lung. Bullous emphysematous  
changes are noted at the bilateral left greater than right lung bases. The  
cardiac silhouette and mediastinum are stable. No pleural effusion evident. Note, pneumothorax is difficult to exclude on this supine radiograph. The  
cardiac silhouette and mediastinum are stable. 09/25/19 1530  XR CHEST PORT Final result Impression:  IMPRESSION: Hyperlucent left lower chest, bullous change versus loculated  
pneumothorax. Narrative:  Chest X-ray INDICATION: Altered mental status A portable AP view of the chest was obtained. FINDINGS: There is hyperlucency of the left lower chest.  Right lung is clear. The heart size is normal.  The bony thorax is intact. CT Results  (Last 48 hours) 09/25/19 1629  CT CHEST WO CONT Final result Impression:  IMPRESSION:   
1.  Bullous change in both lung bases, left greater than right. 2.  Linear scarring/atelectasis in the left upper lobe adjacent to the fissure. Narrative:  CT CHEST INDICATION: Weakness, bullous change versus pneumothorax noted on chest x-ray Multiple axial images were obtained through the chest without IV contrast.   
Radiation dose reduction techniques were used for this study: All CT scans  
performed at this facility use one or all of the following: Automated exposure  
control, adjustment of the mA and/or kVp according to patient's size, iterative  
reconstruction. COMPARISON: Earlier chest x-ray FINDINGS:  
-LUNGS: There is bullous change in both lung bases, left more than right. This  
accounts for the area of lucency seen on the chest x-ray. There is no  
pneumothorax. There is an area of linear density in the left upper lobe  
adjacent to the fissure, most likely scarring or atelectasis. -MEDIASTINUM/AXILLA: No significant adenopathy. -HEART/VESSELS: Normal.  
-CHEST WALL/BONES: Bones are osteopenic. No fractures. -UPPER ABDOMEN: No acute findings. No results found. No results found for this visit on 09/25/19. Labs and Studies from previous 24 hours have been personally reviewed by myself   
ASSESSMENT Active Hospital Problems Diagnosis Date Noted  Cellulitis of right leg 09/25/2019  
 History of pneumothorax 06/19/2019 Last Assessment & Plan:  
History of pneumothorax with lobectomy in the past on the right side  Polio 06/19/2019  Chronic hypoxemic respiratory failure ([facility] [address] 01/29/2019 Last Assessment & Plan:  
Continue with supplemental oxygen  Debility 01/16/2019 Last Assessment & Plan: Supportive care, getting home care, monitor. Hospital Problems as of 9/27/2019 Never Reviewed Codes Class Noted - Resolved POA * (Principal) Cellulitis of right leg ICD-10-CM: L03.115 ICD-9-CM: 682.6  9/25/2019 - Present Unknown History of pneumothorax ICD-10-CM: Z87.09 
ICD-9-CM: V12.69  6/19/2019 - Present Yes Overview Signed 6/19/2019 11:31 AM by Jodell Cowden, NP Last Assessment & Plan: History of pneumothorax with lobectomy in the past on the right side Polio ICD-10-CM: A80.9 ICD-9-CM: 045.90  6/19/2019 - Present Yes Chronic hypoxemic respiratory failure [facility]) ICD-10-CM: J96.11 
ICD-9-CM: 518.83, 799.02  1/29/2019 - Present Yes Overview Signed 6/19/2019 11:31 AM by Andrew Huerta NP Last Assessment & Plan:  
Continue with supplemental oxygen Debility ICD-10-CM: R53.81 ICD-9-CM: 799.3  1/16/2019 - Present Yes Overview Signed 6/19/2019 11:31 AM by Andrew Huerta NP Last Assessment & Plan: Supportive care, getting home care, monitor. A/P: 
 
-Celullitis L leg Erythema resolving BC with GNR 1/2 bottle, repeated BC pending Ortho evaluation appreciated Cont vanco and ceftriaxone day #3 Follow up cultures results. 
  
-? UTI WBC  with positive nitrate 
ucx with GNRs Cont abxs above 
  
-Severe COPD Minimal wheezing Discussed with respiratory therapist currently weaning patient from [place] CT chest on admission with no acute findings Repeated CXR with improve lung aeration Cont nebs and home dose prednisone 
  
-Debility Likely related to problems above PT /OT to evaluate Likely it will be discharged home with home hospice.  
  
-DVT ppx: heparin Code status: DNR 
  
  
 
Vinicius Regan MD 
09/27/19

## 2019-09-27 NOTE — PROGRESS NOTES
Interdisciplinary team rounds were held 9/27/2019 with the following team members:Care Management, Nursing, Nutrition, Pharmacy, Physical Therapy and Physician. Plan of care discussed. See clinical pathway and/or care plan for interventions and desired outcomes.

## 2019-09-27 NOTE — PROGRESS NOTES
Problem: Pressure Injury - Risk of 
Goal: *Prevention of pressure injury Description Document Panchito Scale and appropriate interventions in the flowsheet. Outcome: Progressing Towards Goal 
Note:  
Pressure Injury Interventions: 
Sensory Interventions: Assess changes in LOC, Assess need for specialty bed, Keep linens dry and wrinkle-free Moisture Interventions: Absorbent underpads, Contain wound drainage, Minimize layers Activity Interventions: Increase time out of bed, PT/OT evaluation Mobility Interventions: Pressure redistribution bed/mattress (bed type) Nutrition Interventions: Discuss nutritional consult with provider Friction and Shear Interventions: Apply protective barrier, creams and emollients, HOB 30 degrees or less, Lift sheet Problem: Patient Education: Go to Patient Education Activity Goal: Patient/Family Education Outcome: Progressing Towards Goal 
  
Problem: Falls - Risk of 
Goal: *Absence of Falls Description Document Robbie Monterroso Fall Risk and appropriate interventions in the flowsheet. Outcome: Progressing Towards Goal 
Note:  
Fall Risk Interventions: 
Mobility Interventions: Bed/chair exit alarm, OT consult for ADLs, Utilize walker, cane, or other assistive device Medication Interventions: Bed/chair exit alarm, Patient to call before getting OOB, Teach patient to arise slowly Elimination Interventions: Bed/chair exit alarm, Call light in reach Problem: Patient Education: Go to Patient Education Activity Goal: Patient/Family Education Outcome: Progressing Towards Goal 
  
Problem: Chronic Obstructive Pulmonary Disease (COPD) Goal: *Oxygen saturation during activity within specified parameters Outcome: Progressing Towards Goal 
Goal: *Able to remain out of bed as prescribed Outcome: Progressing Towards Goal 
Goal: *Absence of hypoxia Outcome: Progressing Towards Goal 
Goal: *Optimize nutritional status Outcome: Progressing Towards Goal 
  
Problem: Patient Education: Go to Patient Education Activity Goal: Patient/Family Education Outcome: Progressing Towards Goal

## 2019-09-27 NOTE — PROGRESS NOTES
Problem: Pressure Injury - Risk of 
Goal: *Prevention of pressure injury Description Document Panchito Scale and appropriate interventions in the flowsheet. 9/27/2019 1120 by Epifanio Granda RN Outcome: Progressing Towards Goal 
Note:  
Pressure Injury Interventions: 
Sensory Interventions: Assess changes in LOC, Assess need for specialty bed, Keep linens dry and wrinkle-free Moisture Interventions: Absorbent underpads, Contain wound drainage, Minimize layers Activity Interventions: Increase time out of bed, PT/OT evaluation Mobility Interventions: Pressure redistribution bed/mattress (bed type) Nutrition Interventions: Discuss nutritional consult with provider Friction and Shear Interventions: Apply protective barrier, creams and emollients, HOB 30 degrees or less, Lift sheet 9/27/2019 1118 by Epifanio Granda RN Outcome: Progressing Towards Goal 
Note:  
Pressure Injury Interventions: 
Sensory Interventions: Assess changes in LOC, Assess need for specialty bed, Keep linens dry and wrinkle-free Moisture Interventions: Absorbent underpads, Contain wound drainage, Minimize layers Activity Interventions: Increase time out of bed, PT/OT evaluation Mobility Interventions: Pressure redistribution bed/mattress (bed type) Nutrition Interventions: Discuss nutritional consult with provider Friction and Shear Interventions: Apply protective barrier, creams and emollients, HOB 30 degrees or less, Lift sheet Problem: Patient Education: Go to Patient Education Activity Goal: Patient/Family Education 9/27/2019 1120 by Epifanio Granda RN Outcome: Progressing Towards Goal 
9/27/2019 1118 by Epifanio Granda RN Outcome: Progressing Towards Goal 
  
Problem: Falls - Risk of 
Goal: *Absence of Falls Description Document Caio Thomas Fall Risk and appropriate interventions in the flowsheet. 9/27/2019 1120 by Epifanio Granda RN Outcome: Progressing Towards Goal 
Note: Fall Risk Interventions: 
Mobility Interventions: Bed/chair exit alarm, OT consult for ADLs, Utilize walker, cane, or other assistive device Medication Interventions: Bed/chair exit alarm, Patient to call before getting OOB, Teach patient to arise slowly Elimination Interventions: Bed/chair exit alarm, Call light in reach 9/27/2019 1118 by Nikole Chowdhury RN Outcome: Progressing Towards Goal 
Note:  
Fall Risk Interventions: 
Mobility Interventions: Bed/chair exit alarm, OT consult for ADLs, Utilize walker, cane, or other assistive device Medication Interventions: Bed/chair exit alarm, Patient to call before getting OOB, Teach patient to arise slowly Elimination Interventions: Bed/chair exit alarm, Call light in reach Problem: Patient Education: Go to Patient Education Activity Goal: Patient/Family Education 9/27/2019 1120 by Nikole Chowdhury RN Outcome: Progressing Towards Goal 
9/27/2019 1118 by Nikole Chowdhury RN Outcome: Progressing Towards Goal 
  
Problem: Chronic Obstructive Pulmonary Disease (COPD) Goal: *Oxygen saturation during activity within specified parameters 9/27/2019 1120 by Nikole Chowdhury RN Outcome: Progressing Towards Goal 
9/27/2019 1118 by Nikole Chowdhury RN Outcome: Progressing Towards Goal 
Goal: *Able to remain out of bed as prescribed 9/27/2019 1120 by Nikole Chowdhury RN Outcome: Progressing Towards Goal 
9/27/2019 1118 by Nikole Chowdhury RN Outcome: Progressing Towards Goal 
Goal: *Absence of hypoxia 9/27/2019 1120 by Nikole Chowdhury RN Outcome: Progressing Towards Goal 
9/27/2019 1118 by Nikole Chowdhury RN Outcome: Progressing Towards Goal 
Goal: *Optimize nutritional status 9/27/2019 1120 by Nikole Chowdhury RN Outcome: Progressing Towards Goal 
9/27/2019 1118 by Nikole Chowdhury RN Outcome: Progressing Towards Goal 
  
Problem: Patient Education: Go to Patient Education Activity Goal: Patient/Family Education 9/27/2019 1120 by Karmen Almonte RN Outcome: Progressing Towards Goal 
9/27/2019 1118 by Karmen Almonte RN Outcome: Progressing Towards Goal

## 2019-09-27 NOTE — PROGRESS NOTES
Patient weaned from Airvo to 5L HFNC at this time. Tolerating this change well. Will continue to monitor. Ella Bowen is on stand by.

## 2019-09-27 NOTE — PROGRESS NOTES
's visit requested by Mr. Anamaria Jimenez. Mr. Anamaria Jimenez seemed distressed, asking me to help him so he does not \"go crazy. \" Mr. Anamaria Jimenez explained that he has been seeing imaginations of people and he is aware that this is his imagination. He went into detail about one imaginary encounter he had with a hospice chaplain who is known to visit patient at home. Mr. Anamaria Jimenez spoke at length about his life, family, and God. I offered spiritual/emotional support, including empathic listening, and encouraged Mr. Anamaria Jimenez to also share his imaginations with medical staff. Mr. Anamaria Jimenez told me that he had not shared his imaginations with staff. I contacted patient's nurse after the visit. Chaplains remain available for support. Omid Claros MDiv Board Certified [employer]

## 2019-09-27 NOTE — PROGRESS NOTES
Lab Results Component Value Date/Time Vancomycin,trough 12.8 09/27/2019 03:43 PM  
 
This trough is in the goal range of 10-20. Therefore we will continue the dose of 1g q12h. We will continue to follow the patient and adjust the dose as necessary per guidelines. Thank you, Zaheer Rao, DanniD

## 2019-09-27 NOTE — WOUND CARE
Pt seen for right medial knee wound. Removed dressing noted brown thick drainage from wound with blanchable erythema on bernadette wound, swelling on RLE. Wound bed with yellow pockets of puss, pt would benefit from some sort of drainage but considering goals of care, hospice, will manage drainage. Cleansed wound applied foam dressing and covered with duoderm in hopes of autolytic debridement. Noted several puss areas, raised and inflamed. Popped blister noted on posterior aspect of left thigh. Applied mepilex to area to manage drainage. Recommend dressing changes daily and as needed. Also recommend putting a pillow in between knees to relieve pressure from other knee. Wound team will continue to follow while in acute care setting.

## 2019-09-27 NOTE — PROGRESS NOTES
Problem: Pressure Injury - Risk of 
Goal: *Prevention of pressure injury Description Document Panchito Scale and appropriate interventions in the flowsheet. Outcome: Progressing Towards Goal 
Note:  
Pressure Injury Interventions: 
Sensory Interventions: Assess changes in LOC, Assess need for specialty bed, Keep linens dry and wrinkle-free Moisture Interventions: Absorbent underpads, Contain wound drainage, Minimize layers Activity Interventions: Increase time out of bed, PT/OT evaluation Mobility Interventions: Pressure redistribution bed/mattress (bed type) Nutrition Interventions: Discuss nutritional consult with provider Friction and Shear Interventions: Apply protective barrier, creams and emollients, HOB 30 degrees or less, Lift sheet Problem: Patient Education: Go to Patient Education Activity Goal: Patient/Family Education Outcome: Progressing Towards Goal 
  
Problem: Falls - Risk of 
Goal: *Absence of Falls Description Document Vickii Bridges Fall Risk and appropriate interventions in the flowsheet. Outcome: Progressing Towards Goal 
Note:  
Fall Risk Interventions: 
Mobility Interventions: Bed/chair exit alarm, OT consult for ADLs, Utilize walker, cane, or other assistive device Medication Interventions: Bed/chair exit alarm, Patient to call before getting OOB, Teach patient to arise slowly Elimination Interventions: Bed/chair exit alarm, Call light in reach Problem: Patient Education: Go to Patient Education Activity Goal: Patient/Family Education Outcome: Progressing Towards Goal 
  
Problem: Chronic Obstructive Pulmonary Disease (COPD) Goal: *Oxygen saturation during activity within specified parameters Outcome: Progressing Towards Goal 
Goal: *Able to remain out of bed as prescribed Outcome: Progressing Towards Goal 
Goal: *Absence of hypoxia Outcome: Progressing Towards Goal 
Goal: *Optimize nutritional status Outcome: Progressing Towards Goal 
  
Problem: Patient Education: Go to Patient Education Activity Goal: Patient/Family Education Outcome: Progressing Towards Goal

## 2019-09-27 NOTE — PROGRESS NOTES
Morphine 0.5 mg IVP for 10/10 chest pain due to coughing. Robitussin given prn for coughing. Continue to monitor.

## 2019-09-27 NOTE — PROGRESS NOTES
Am assessment completed. Pt is alert and oriented Verbalizes needs. Dressings on wounds waiting for wound care and treatment plan. On high flow 02. Appetite good. Castillo to bedside back which patient has had long term. Denies pain. Continue to monitor.

## 2019-09-28 NOTE — PROGRESS NOTES
Shift assessment complete. Pt alert and oriented x4. Pt anixous and restless. Respirations labored, shallow, with accessory muscles being utilized. Lung sounds coarse on supplemental 02 via NC. HR regular. Abdomen soft with active bowel sounds heard in all four quadrants. Skin thin, fragile, with wounds noted to BLE. Dressings intact to wounds. Trace edema noted to BLE. Castillo catheter in place per MD order, draining to gravity. IV patent and capped. Safety measures in place, call light within reach, fall prevention devices intact, door remaining open. Will continue to monitor.

## 2019-09-28 NOTE — PROGRESS NOTES
Hospitalist Progress Note 2019 Admit Date: 2019  2:20 PM  
NAME: Rusty Martinez. :  1950 DOS:              19 MRN:  [id number] Attending: Blane Plunkett MD 
PCP:  Tor Torres MD 
Treatment Team: Attending Provider: Warren Berry MD; Consulting Provider: Srini Casas MD; Care Manager: Maisha Parada RN; Utilization Review: Phylicia Trujillo 
 
DNR SUBJECTIVE: As previously documented: 68years old M with PMH of severe COPD on 3L O2/ hospice, anxiety, pneumothorax, Polio presented to the hospital complaining of R leg tenderness for almost 1 week. In the ED patient had elevated WBC to 18 and findings of cellulitis of R leg. Ortho reported patient does not have septic arthritis. MRI with no subcutaneous abscess. Patient having acute on chronic respi failure on airvo weaned to [place]. 
 
 
 19    Rusty Martinez. reported his breathing is better. Nurse reported patient is very anxious. Nurse also reported patient stop eating. 10+ ROS reviewed and negative except for positive in HPI. No Known Allergies Current Facility-Administered Medications Medication Dose Route Frequency  [START ON 2019] VANCOMYCIN INFORMATION NOTE   Other ONCE  
 LORazepam (ATIVAN) injection 0.5 mg  0.5 mg IntraVENous Q4H PRN  
 vancomycin (VANCOCIN) 1,000 mg in 0.9% sodium chloride (MBP/ADV) 250 mL  1 g IntraVENous Q12H  
 morphine injection 0.5 mg  0.5 mg IntraVENous Q3H PRN  
 guaiFENesin-dextromethorphan (ROBITUSSIN DM) 100-10 mg/5 mL syrup 10 mL  10 mL Oral Q4H PRN  
 albuterol-ipratropium (DUO-NEB) 2.5 MG-0.5 MG/3 ML  3 mL Nebulization Q6H RT  
 influenza vaccine - (6 mos+)(PF) (FLUARIX/FLULAVAL/FLUZONE QUAD) injection 0.5 mL  0.5 mL IntraMUSCular PRIOR TO DISCHARGE  sodium chloride (NS) flush 5-40 mL  5-40 mL IntraVENous Q8H  
 sodium chloride (NS) flush 5-40 mL  5-40 mL IntraVENous PRN  
  cefTRIAXone (ROCEPHIN) 1 g in 0.9% sodium chloride (MBP/ADV) 50 mL  1 g IntraVENous Q24H  
 heparin (porcine) injection 5,000 Units  5,000 Units SubCUTAneous Q8H  
 acetaminophen (TYLENOL) tablet 650 mg  650 mg Oral Q4H PRN  
 clonazePAM (KlonoPIN) tablet 1 mg  1 mg Oral TID  pramipexole (MIRAPEX) tablet 1 mg  1 mg Oral QHS  predniSONE (DELTASONE) tablet 20 mg  20 mg Oral DAILY  traZODone (DESYREL) tablet 150 mg  150 mg Oral QHS  sertraline (ZOLOFT) tablet 100 mg  100 mg Oral DAILY  albuterol-ipratropium (DUO-NEB) 2.5 MG-0.5 MG/3 ML  3 mL Nebulization TID PRN  
 budesonide (PULMICORT) 500 mcg/2 ml nebulizer suspension  500 mcg Nebulization BID RT  
 senna (SENOKOT) tablet 8.6 mg  1 Tab Oral DAILY  famotidine (PEPCID) tablet 20 mg  20 mg Oral DAILY  benzonatate (TESSALON) capsule 200 mg  200 mg Oral TID Immunization History Administered Date(s) Administered  TB Skin Test (PPD) Intradermal 2019 Objective:  
 
Patient Vitals for the past 24 hrs: 
 Temp Pulse Resp BP SpO2  
19 0730 97.7 °F (36.5 °C) 96 20 140/79 100 % 19 0411 98.1 °F (36.7 °C) 77 18 120/72 97 % 19 0005 97.6 °F (36.4 °C) 80 18 128/65 98 %  
19 195 98.2 °F (36.8 °C) 79 20 140/75 95 % 19 1557 99.2 °F (37.3 °C) 87 20 135/64 97 % 19 1344     92 % Temp (24hrs), Av.2 °F (36.8 °C), Min:97.6 °F (36.4 °C), Max:99.2 °F (37.3 °C) Oxygen Therapy O2 Sat (%): 100 % (19 0730) Pulse via Oximetry: 89 beats per minute (19) O2 Device: Hi flow nasal cannula (19) O2 Flow Rate (L/min): 5 l/min (19) O2 Temperature: 87.8 °F (31 °C) (19) FIO2 (%): 35 %(weaned from 40%) (19) Oxygen Therapy O2 Sat (%): 100 % (19) Pulse via Oximetry: 89 beats per minute (19) O2 Device: Hi flow nasal cannula (19) O2 Flow Rate (L/min): 5 l/min (19) O2 Temperature: 87.8 °F (31 °C) (09/27/19 0746) FIO2 (%): 35 %(weaned from 40%) (09/27/19 0746) Physical Exam: 
 
General:          Well nourished. Alert. ENT:                  Moist mucous membranes CV:                  RRR. No murmur, rub, or gallop. Lungs:             Diminished breath sounds b/l. Minimal expiratory wheezing b/l. No rhonchi Abdomen:        Soft, nontender, nondistended. Bowel sounds normal.  
Extremities:     Warm and dry. No cyanosis or edema. Neurologic:       No gross focal deficits Skin:                R leg with abscess on medial knee with no fluctuance. R tight with two small abscess draining brownish fluids located ant and post aspect. DIAGNOSTIC STUDIES Data Review:  
Recent Results (from the past 24 hour(s)) Libia Gordon Collection Time: 09/27/19  3:43 PM  
Result Value Ref Range Vancomycin,trough 12.8 5 - 20 ug/mL CBC WITH AUTOMATED DIFF Collection Time: 09/28/19  5:39 AM  
Result Value Ref Range WBC 13.3 (H) 4.3 - 11.1 K/uL  
 RBC 4.08 (L) 4.23 - 5.6 M/uL  
 HGB 11.5 (L) 13.6 - 17.2 g/dL HCT 40.1 (L) 41.1 - 50.3 % MCV 98.3 (H) 79.6 - 97.8 FL  
 MCH 28.2 26.1 - 32.9 PG  
 MCHC 28.7 (L) 31.4 - 35.0 g/dL  
 RDW 14.3 11.9 - 14.6 % PLATELET 483 729 - 516 K/uL MPV 9.9 9.4 - 12.3 FL ABSOLUTE NRBC 0.00 0.0 - 0.2 K/uL  
 DF AUTOMATED NEUTROPHILS 88 (H) 43 - 78 % LYMPHOCYTES 5 (L) 13 - 44 % MONOCYTES 5 4.0 - 12.0 % EOSINOPHILS 0 (L) 0.5 - 7.8 % BASOPHILS 0 0.0 - 2.0 % IMMATURE GRANULOCYTES 1 0.0 - 5.0 %  
 ABS. NEUTROPHILS 11.7 (H) 1.7 - 8.2 K/UL  
 ABS. LYMPHOCYTES 0.7 0.5 - 4.6 K/UL  
 ABS. MONOCYTES 0.7 0.1 - 1.3 K/UL  
 ABS. EOSINOPHILS 0.0 0.0 - 0.8 K/UL  
 ABS. BASOPHILS 0.0 0.0 - 0.2 K/UL  
 ABS. IMM. GRANS. 0.2 0.0 - 0.5 K/UL METABOLIC PANEL, BASIC Collection Time: 09/28/19  5:39 AM  
Result Value Ref Range Sodium 145 136 - 145 mmol/L  Potassium 4.4 3.5 - 5.1 mmol/L  
 Chloride 107 98 - 107 mmol/L  
 CO2 36 (H) 21 - 32 mmol/L Anion gap 2 (L) 7 - 16 mmol/L Glucose 113 (H) 65 - 100 mg/dL BUN 18 8 - 23 MG/DL Creatinine 1.00 0.8 - 1.5 MG/DL  
 GFR est AA >60 >60 ml/min/1.73m2 GFR est non-AA >60 >60 ml/min/1.73m2 Calcium 9.2 8.3 - 10.4 MG/DL All Micro Results Procedure Component Value Units Date/Time CULTURE, URINE [[id number]]  (Abnormal)  (Susceptibility) Collected:  09/25/19 1646 Order Status:  Completed Specimen:  Cath Urine Updated:  09/28/19 9229 Special Requests: NO SPECIAL REQUESTS Culture result:    
  10,000 to 50,000 COLONIES/mL ESCHERICHIA COLI  
     
 CULTURE, BLOOD [[id number]]  (Abnormal)  (Susceptibility) Collected:  09/25/19 1812 Order Status:  Completed Specimen:  Blood Updated:  09/28/19 0848 Special Requests: --     
  LEFT 
HAND 
  
  GRAM STAIN GRAM NEGATIVE RODS AEROBIC BOTTLE POSITIVE RESULTS VERIFIED, PHONED TO AND READ BACK BY Lisa Villasenor RN AT 8486 09/26/2019 BY GILMARTAYLOR Culture result: ESCHERICHIA COLI     
   REFER TO [facility] Santiago Moreau PANEL [id number] CULTURE, BLOOD [[id number]]  (Abnormal) Collected:  09/25/19 1427 Order Status:  Completed Specimen:  Blood Updated:  09/28/19 0848 Special Requests: RIGHT ANTECUBITAL     
  GRAM STAIN    
  GRAM NEGATIVE RODS ANAEROBIC BOTTLE POSITIVE CRITICAL RESULT NOT CALLED DUE TO PREVIOUS NOTIFICATION OF CRITICAL RESULT WITHIN THE LAST 24 HOURS. Culture result: [id number] [facility] REFER TO [address] [id number] BLOOD CULTURE ID PANEL [[id number]]  (Abnormal) Collected:  09/25/19 1812 Order Status:  Completed Specimen:  Blood Updated:  09/28/19 5074 Acc. no. from [facility] [id number] Escherichia coli DETECTED   Comment: RESULTS VERIFIED, PHONED TO AND READ BACK BY 
Zeb Capps RN @ 1300 ON 9/26/2019 BY REGLA 
  
  
  KPC (Carbapenem Resistance Gene) NOT DETECTED     
 Comment: WARNING:  A Not Detected result for the KPC gene does not indicate susceptibility to carbapenems. Gram negative bacteria can be resistant to carbapenems by mechanisms other than carrying the KPC gene. INTERPRETATION Gram negative darell. Identified by realtime PCR as E. coli BLOOD CULTURE ID PANEL [[id number]]  (Abnormal) Collected:  09/25/19 1427 Order Status:  Completed Specimen:  Blood Updated:  09/28/19 6739 Acc. no. from [facility] [id number] Escherichia coli DETECTED Comment: RESULTS VERIFIED, PHONED TO AND READ BACK BY 
BRI DINH,RN @ 0018 ON [id number] BY MARLI 
  
  
  KPC (Carbapenem Resistance Gene) NOT DETECTED Comment: WARNING:  A Not Detected result for the KPC gene does not indicate susceptibility to carbapenems. Gram negative bacteria can be resistant to carbapenems by mechanisms other than carrying the KPC gene. INTERPRETATION Gram negative darell. Identified by realtime PCR as E. coli CULTURE, BLOOD [[id number]] Collected:  09/27/19 0449 Order Status:  Completed Specimen:  Blood Updated:  09/27/19 5367 CULTURE, BLOOD [[id number]] Collected:  09/27/19 0451 Order Status:  Completed Specimen:  Blood Updated:  09/27/19 9264 Imaging Esvin Se /Studies: CXR Results  (Last 48 hours) 09/26/19 1230  XR CHEST SNGL V Final result Impression:  IMPRESSION:  
1. Interval improved aeration of the left lung with stable, severe emphysematous  
changes more evident at the lung bases. Pneumothorax is not excluded on this  
supine exam.  
  
 Narrative:  PA AND LATERAL CHEST X-RAY. Clinical Indication: Shortness of breath Comparison: Chest x-ray dated 9/25/2019 Findings: Two views of the chest submitted and compared to a prior exam from the  
previous day shows improved aeration of the left lung. Bullous emphysematous changes are noted at the bilateral left greater than right lung bases. The  
cardiac silhouette and mediastinum are stable. No pleural effusion evident. Note, pneumothorax is difficult to exclude on this supine radiograph. The  
cardiac silhouette and mediastinum are stable. CT Results  (Last 48 hours) None No results found. No results found for this visit on 09/25/19. Labs and Studies from previous 24 hours have been personally reviewed by myself   
ASSESSMENT Active Hospital Problems Diagnosis Date Noted  Cellulitis of right leg 09/25/2019  
 History of pneumothorax 06/19/2019 Last Assessment & Plan:  
History of pneumothorax with lobectomy in the past on the right side  Polio 06/19/2019  Chronic hypoxemic respiratory failure ([address] 01/29/2019 Last Assessment & Plan:  
Continue with supplemental oxygen  Debility 01/16/2019 Last Assessment & Plan: Supportive care, getting home care, monitor. Hospital Problems as of 9/28/2019 Never Reviewed Codes Class Noted - Resolved POA * (Principal) Cellulitis of right leg ICD-10-CM: L03.115 ICD-9-CM: 682.6  9/25/2019 - Present Unknown History of pneumothorax ICD-10-CM: Z87.09 
ICD-9-CM: V12.69  6/19/2019 - Present Yes Overview Signed 6/19/2019 11:31 AM by Florencia Forde NP Last Assessment & Plan:  
History of pneumothorax with lobectomy in the past on the right side Polio ICD-10-CM: A80.9 ICD-9-CM: 045.90  6/19/2019 - Present Yes Chronic hypoxemic respiratory failure [facility]) ICD-10-CM: J96.11 
ICD-9-CM: 518.83, 799.02  1/29/2019 - Present Yes Overview Signed 6/19/2019 11:31 AM by Florencia Forde NP Last Assessment & Plan:  
Continue with supplemental oxygen Debility ICD-10-CM: R53.81 ICD-9-CM: 799.3  1/16/2019 - Present Yes Overview Signed 6/19/2019 11:31 AM by Florencia Forde NP Last Assessment & Plan: Supportive care, getting home care, monitor. A/P: 
 
-Celullitis R leg and L tight S/p Ortho eval 
On vanco and ceftriaxone day #4, continue for 1 more days as L side lesions still have some erythema If continues to improve switch abxs to PO tomorrow. -E.coli Bacteremia 
?secondary to cellulitis and/or  UTI Repeated BC pending Cont abxs above -?UTI WBC  with positive nitrate 
ucx ecoli Cont abxs above 
  
-Severe COPD Patient weaned to [place] Cont nebs and home dose prednisone Supportive care with morphine and ativan  
  
-Debility Unclear if patient can tolerate PT with severe COPD. Mostly wheelchair/bedbound at home with hospice. Patient stop eating today. It will be difficult for patient's sister to manage him outpatient. I discussed with CM and Patient sister on  [phone]. Patient's agreed to be discharged to hospice house Will ask hospice to evaluate.  
  
-DVT ppx: heparin Code status: DNR 
  
  
 
Alena Singh MD 
09/28/19

## 2019-09-28 NOTE — PROGRESS NOTES
Problem: Pressure Injury - Risk of 
Goal: *Prevention of pressure injury Description Document Panchito Scale and appropriate interventions in the flowsheet. Outcome: Progressing Towards Goal 
Note:  
Pressure Injury Interventions: 
Sensory Interventions: Assess changes in LOC, Assess need for specialty bed Moisture Interventions: Absorbent underpads, Apply protective barrier, creams and emollients, Contain wound drainage Activity Interventions: Increase time out of bed, PT/OT evaluation Mobility Interventions: Assess need for specialty bed, Pressure redistribution bed/mattress (bed type) Nutrition Interventions: Discuss nutritional consult with provider Friction and Shear Interventions: Apply protective barrier, creams and emollients Problem: Patient Education: Go to Patient Education Activity Goal: Patient/Family Education Outcome: Progressing Towards Goal 
  
Problem: Falls - Risk of 
Goal: *Absence of Falls Description Document [facility] Fall Risk and appropriate interventions in the flowsheet. Outcome: Progressing Towards Goal 
Note:  
Fall Risk Interventions: 
Mobility Interventions: Bed/chair exit alarm, OT consult for ADLs, Utilize walker, cane, or other assistive device Medication Interventions: Bed/chair exit alarm, Patient to call before getting OOB Elimination Interventions: Bed/chair exit alarm, Call light in reach, Patient to call for help with toileting needs Problem: Patient Education: Go to Patient Education Activity Goal: Patient/Family Education Outcome: Progressing Towards Goal 
  
Problem: Chronic Obstructive Pulmonary Disease (COPD) Goal: *Oxygen saturation during activity within specified parameters Outcome: Progressing Towards Goal 
Goal: *Able to remain out of bed as prescribed Outcome: Progressing Towards Goal 
Goal: *Absence of hypoxia Outcome: Progressing Towards Goal 
Goal: *Optimize nutritional status Outcome: Progressing Towards Goal 
  
Problem: Patient Education: Go to Patient Education Activity Goal: Patient/Family Education Outcome: Progressing Towards Goal

## 2019-09-28 NOTE — PROGRESS NOTES
OT /PT note: orders received, chart reviewed nursing advised to hold evals at this today. Will recheck tomorrow.  jesus Barnett OTR/L

## 2019-09-28 NOTE — PROGRESS NOTES
Patient accepted to [facility], anticipated to transfer Monday. Asha Figueroa, PRANAYW  [email or web address]

## 2019-09-28 NOTE — PROGRESS NOTES
PRN Ativan 0.5 mg given slow, IVP per MD order for increased anxiety. Safety measures in place, call light within reach, door remaining open. Will continue to monitor.

## 2019-09-28 NOTE — PROGRESS NOTES
PRN Morphine 0.5 mg given slow, IVP per MD order for shortness of breath. Safety measures in place, call light within reach, sister at bedside, door remaining open. Will continue to monitor.

## 2019-09-28 NOTE — PROGRESS NOTES
Patient and sister discussing the possibility of going to hospice house at discharge. Patient was current with [facility] prior to admission. YOGESH called Abena Paul with [facility] who will evaluate the patient for Susy Pedraza this afternoon. YOGESH notified patient and advised his sister to return to hospital at 3:30 pm to meet with liaison. YOGESH will request MD submit a hospice consult. SAMANTHA Sam  [email or web address]

## 2019-09-28 NOTE — HOSPICE
[facility]- 
 
I met with pt and his sister and reviewed hospice services. Pt states \"I know I am dying and I do not want to be resuscitated\". Pt having labored breathing at times, especially when expressing emotion. Sister would like pt to go the [facility] for comfort care and pt in agreement. We discussed that if pt should improve a great deal he would need to transition back to her home with hospice care and she stated that would be ok. Dr. Marcie Jensen called and he reviewed pt's record and had a few questions with Dr. Adrian Barksdale. Dr. Marcie Jensen states that the pt is approved to come to the [facility] Monday am.  He would like the IV antibiotic to be finished and the oral to be initiated while in the hospital. 
He recommended that the morphine be increased to 2mg IV for his SOB or pain. DNR and hospice consents completed with Adriana Walls. PLEASE LEAVE THEA AND SOFIA IN FOR THE [facility]. Thank you for this referral- 
 
Arely Regalado RN BSN Hospice Liaison [phone]

## 2019-09-28 NOTE — PROGRESS NOTES
Resting quietly, resp irregular, labored at rest with O2 intact. Skin warm, dry. Incontinent of large stool with care given and repositioning. Eyes remained closed during care. Dsgs to BLE clean, dry, intact with areas of scabbing noted. RLE red. Eyes opened briefly, looked at nurse, during assessment with no verbal response. Facial expression relaxed with no distress noted.

## 2019-09-28 NOTE — PROGRESS NOTES
PRN Morphine 0.5 mg given slow, IVP per MD order for shortness of breath. Safety measures in place, call light within reach, door remaining open. Will continue to monitor.

## 2019-09-28 NOTE — PROGRESS NOTES
Orthopedic Joint Progress Note 2019 Admit Date: 2019 Admit Diagnosis: Cellulitis of right leg [P11.940] * No surgery found * Subjective:  
 
[place] Getting. PATIENT RESTING IN BED Review of Systems: Pertinent items are noted in HPI. Objective:  
 
PT/OT:  
 
PATIENT MOBILITY Vital Signs:   
Blood pressure 120/72, pulse 77, temperature 98.1 °F (36.7 °C), resp. rate 18, height 5' 8\" (1.727 m), weight 72.6 kg (160 lb), SpO2 97 %. Temp (24hrs), Av.7 °F (36.5 °C), Min:95.5 °F (35.3 °C), Max:99.2 °F (37.3 °C) Pain Control:  
Pain Assessment Pain Scale 1: Visual(pain reassessment) Pain Intensity 1: 0(pt sleeping) Pain Location 1: Chest 
Pain Intervention(s) 1: Medication (see MAR) Meds: 
Current Facility-Administered Medications Medication Dose Route Frequency  vancomycin (VANCOCIN) 1,000 mg in 0.9% sodium chloride (MBP/ADV) 250 mL  1 g IntraVENous Q12H  
 morphine injection 0.5 mg  0.5 mg IntraVENous Q3H PRN  
 guaiFENesin-dextromethorphan (ROBITUSSIN DM) 100-10 mg/5 mL syrup 10 mL  10 mL Oral Q4H PRN  
 albuterol-ipratropium (DUO-NEB) 2.5 MG-0.5 MG/3 ML  3 mL Nebulization Q6H RT  
 influenza vaccine - (6 mos+)(PF) (FLUARIX/FLULAVAL/FLUZONE QUAD) injection 0.5 mL  0.5 mL IntraMUSCular PRIOR TO DISCHARGE  sodium chloride (NS) flush 5-40 mL  5-40 mL IntraVENous Q8H  
 sodium chloride (NS) flush 5-40 mL  5-40 mL IntraVENous PRN  
 cefTRIAXone (ROCEPHIN) 1 g in 0.9% sodium chloride (MBP/ADV) 50 mL  1 g IntraVENous Q24H  
 heparin (porcine) injection 5,000 Units  5,000 Units SubCUTAneous Q8H  
 acetaminophen (TYLENOL) tablet 650 mg  650 mg Oral Q4H PRN  
 clonazePAM (KlonoPIN) tablet 1 mg  1 mg Oral TID  pramipexole (MIRAPEX) tablet 1 mg  1 mg Oral QHS  predniSONE (DELTASONE) tablet 20 mg  20 mg Oral DAILY  traZODone (DESYREL) tablet 150 mg  150 mg Oral QHS  sertraline (ZOLOFT) tablet 100 mg  100 mg Oral DAILY  albuterol-ipratropium (DUO-NEB) 2.5 MG-0.5 MG/3 ML  3 mL Nebulization TID PRN  
 budesonide (PULMICORT) 500 mcg/2 ml nebulizer suspension  500 mcg Nebulization BID RT  
 senna (SENOKOT) tablet 8.6 mg  1 Tab Oral DAILY  famotidine (PEPCID) tablet 20 mg  20 mg Oral DAILY  benzonatate (TESSALON) capsule 200 mg  200 mg Oral TID  
  
 
LAB:   
No results found for: INR, INREXT, INREXT Lab Results Component Value Date/Time HGB 11.5 (L) 09/28/2019 05:39 AM  
 HGB 10.7 (L) 09/27/2019 04:49 AM  
 HGB 11.5 (L) 09/26/2019 04:41 AM  
 
 
Wound Knee Inner;Right red with puss filled blister 09/24/19 (Active) Dressing Status Breakthrough drainage 9/27/2019  2:44 PM  
Condition of [place] 9/27/2019  2:44 PM  
Condition of Edges Closed 9/27/2019  2:44 PM  
Assessment Red;Painful;Swelling 9/27/2019  2:44 PM  
Drainage Color Tan 9/27/2019  2:44 PM  
Alis-wound Assessment Blanchable erythema 9/27/2019  2:44 PM  
Dressing Changed Changed/New 9/27/2019  2:44 PM  
Dressing Type Applied Foam;Hydrocolloid 9/27/2019  2:44 PM  
Procedure Tolerated Well 9/27/2019  2:44 PM  
Number of days: 4 [REMOVED] Wound Arm lower Anterior;Right skin tear as result of fall today. 07/09/19 (Removed) Number of days: 2 [REMOVED] Wound Arm lower Dorsal new skin tear from fall today 07/09/19 (Removed) Number of days: 9 Physical Exam: 
General: alert, cooperative, no distress, appears stated age Musculoskeletal: negative Neurological: negative Skin:Normal. 
 
Assessment:  
  
Principal Problem: 
  Cellulitis of right leg (9/25/2019) Active Problems: 
  Chronic hypoxemic respiratory failure ([address] (1/29/2019) Overview: Last Assessment & Plan:  
    Continue with supplemental oxygen Debility (1/16/2019) Overview: Last Assessment & Plan: Supportive care, getting home care, monitor. History of pneumothorax (6/19/2019) Overview: Last Assessment & Plan: History of pneumothorax with lobectomy in the past on the right side Polio (6/19/2019) Plan:  
 
Continue PT/OT/Rehab 
NOT A SEPTIC JOINT 
CONTINUE WOUND CARE TO WOUND

## 2019-09-29 NOTE — HOSPICE
Plans to transfer to [facility] tomorrow. Spoke briefly with Dr. Amanda Hernandez. Patient tolerating PO keflex and appears comfortable with IV Morphine and Ativan. Liaison to arrange transport tomorrow. Donato Haji RN BSN [facility] [phone]

## 2019-09-29 NOTE — PROGRESS NOTES
Shift assessment complete. Pt alert, oriented to person and place, disoriented to time. Pt anixous and restless. Respirations labored, shallow, with accessory muscles being utilized. Lung sounds coarse on supplemental 02 via mask. HR regular. Abdomen distended, soft with active bowel sounds heard in all four quadrants. Skin thin, fragile, with wounds noted to BLE. Dressings intact to wounds. Pillows placed between bony prominences. Trace edema noted to BLE. Castillo catheter in place per MD order, draining to gravity. IV patent and infusing. Safety measures in place, call light within reach, fall prevention devices intact, door remaining open. Will continue to monitor.

## 2019-09-29 NOTE — PROGRESS NOTES
Continues to rest quietly, aroused briefly, opening eyes with mumbled words to nurse. States unable to take any pills right now. No needs. Stated \"I'm exhausted\". Facial expression relaxed with no distress noted. PO meds on hold for now.

## 2019-09-29 NOTE — PROGRESS NOTES
Awake, restless during report given to Nury Ho RN.  Discussed benefit of AtDevora sandhu planning to give to calm and facilitate rest.

## 2019-09-29 NOTE — PROGRESS NOTES
Pt increasingly anxious. PRN Ativan 0.5 mg given slow, IVP per MD order. Safety measures in place, call light within reach, door remaining open. Will continue to monitor.

## 2019-09-29 NOTE — PROGRESS NOTES
Orthopedic Progress Note 2019 Admit Date: 2019 Admit Diagnosis: Cellulitis of right leg [S77.009] Post Op day: * No surgery found * Subjective:  
 
Harriet Erickson. No new c/o. Seen by wound care Objective:  
 
Vital Signs:   
Temp (24hrs), Av.4 °F (36.9 °C), Min:98 °F (36.7 °C), Max:98.9 °F (37.2 °C) LAB:   
[unfilled] No results found for: INR, INREXT Lab Results Component Value Date/Time HGB 11.5 (L) 2019 03:12 AM  
 HGB 11.5 (L) 2019 05:39 AM  
 
 
Physical Exam: 
 
R medial knee wound stable. No knee effusion. L thigh with small anterior lesion and approx 10mm posterior lesion, both with erythema Plan: No septic arthritis. May benefit from ID eval. Pt prefers not to be seen by 'more doctors'. Avoid mechanical pressure Signed By: Laura Irwin MD

## 2019-09-29 NOTE — PROGRESS NOTES
Awake, IV found pulled out by pt, O2 off. O2 changed to Venturi mask 12L at 40% by Esther Lockett RN, per Resp Therapist. Head of bed elevated.

## 2019-09-29 NOTE — PROGRESS NOTES
Hospitalist Progress Note 2019 Admit Date: 2019  2:20 PM  
NAME: Nikki Owens. :  1950 DOS:              19 MRN:  [id number] Attending: Shane Siddiqui MD 
PCP:  Charles Dyson MD 
Treatment Team: Attending Provider: Katrina Lyn MD; Consulting Provider: Brianna Mayo MD; Care Manager: Jennifer Gibson RN; Utilization Review: Sari Stuart 
 
DNR SUBJECTIVE: As previously documented: 68years old M with PMH of severe COPD on 3L O2/ hospice, anxiety, pneumothorax, Polio presented to the hospital complaining of R leg tenderness for almost 1 week. In the ED patient had elevated WBC to 18 and findings of cellulitis of R leg. Ortho reported patient does not have septic arthritis. MRI with no subcutaneous abscess. Patient having acute on chronic respi failure on airvo weaned to [place]. 
 
 
 19    Nikki Owens. reported having more stamina today. No complaints. 10+ ROS reviewed and negative except for positive in HPI. No Known Allergies Current Facility-Administered Medications Medication Dose Route Frequency  trimethoprim-sulfamethoxazole (BACTRIM DS, SEPTRA DS) 160-800 mg per tablet 1 Tab  1 Tab Oral Q12H  
 [START ON 2019] Saccharomyces boulardii (FLORASTOR) capsule 500 mg  500 mg Oral DAILY  cephALEXin (KEFLEX) capsule 500 mg  500 mg Oral Q6H  
 LORazepam (ATIVAN) injection 0.5 mg  0.5 mg IntraVENous Q4H PRN  
 morphine injection 2 mg  2 mg IntraVENous Q3H PRN  
 hydrALAZINE (APRESOLINE) 20 mg/mL injection 10 mg  10 mg IntraVENous Q6H PRN  
 guaiFENesin-dextromethorphan (ROBITUSSIN DM) 100-10 mg/5 mL syrup 10 mL  10 mL Oral Q4H PRN  
 albuterol-ipratropium (DUO-NEB) 2.5 MG-0.5 MG/3 ML  3 mL Nebulization Q6H RT  
 influenza vaccine - (6 mos+)(PF) (FLUARIX/FLULAVAL/FLUZONE QUAD) injection 0.5 mL  0.5 mL IntraMUSCular PRIOR TO DISCHARGE  sodium chloride (NS) flush 5-40 mL  5-40 mL IntraVENous Q8H  
  sodium chloride (NS) flush 5-40 mL  5-40 mL IntraVENous PRN  
 heparin (porcine) injection 5,000 Units  5,000 Units SubCUTAneous Q8H  
 acetaminophen (TYLENOL) tablet 650 mg  650 mg Oral Q4H PRN  
 clonazePAM (KlonoPIN) tablet 1 mg  1 mg Oral TID  pramipexole (MIRAPEX) tablet 1 mg  1 mg Oral QHS  predniSONE (DELTASONE) tablet 20 mg  20 mg Oral DAILY  traZODone (DESYREL) tablet 150 mg  150 mg Oral QHS  sertraline (ZOLOFT) tablet 100 mg  100 mg Oral DAILY  albuterol-ipratropium (DUO-NEB) 2.5 MG-0.5 MG/3 ML  3 mL Nebulization TID PRN  
 budesonide (PULMICORT) 500 mcg/2 ml nebulizer suspension  500 mcg Nebulization BID RT  
 senna (SENOKOT) tablet 8.6 mg  1 Tab Oral DAILY  famotidine (PEPCID) tablet 20 mg  20 mg Oral DAILY  benzonatate (TESSALON) capsule 200 mg  200 mg Oral TID Immunization History Administered Date(s) Administered  TB Skin Test (PPD) Intradermal 2019 Objective:  
 
Patient Vitals for the past 24 hrs: 
 Temp Pulse Resp BP SpO2  
19 1154 97.7 °F (36.5 °C) 74 18 115/72 96 %  
19 0804 97.2 °F (36.2 °C) (!) 103 18 155/65 91 %  
19 0342 98.6 °F (37 °C) 89 18 156/90 94 % 19 0043     90 % 19 2300 98.1 °F (36.7 °C) 95 20 (!) 137/92 95 % 19 2000 98 °F (36.7 °C) 82 18 128/81 98 %  
19 1846  91  118/80 98 %  
19 1600 98.9 °F (37.2 °C) (!) 126 18 (!) 210/120 93 % Temp (24hrs), Av.1 °F (36.7 °C), Min:97.2 °F (36.2 °C), Max:98.9 °F (37.2 °C) Oxygen Therapy O2 Sat (%): 96 % (19 1154) Pulse via Oximetry: 81 beats per minute (19) O2 Device: Ventimask (19) O2 Flow Rate (L/min): 15 l/min (19) O2 Temperature: (.) (19 2245) FIO2 (%): 50 % (19 0047) Oxygen Therapy O2 Sat (%): 96 % (19 1154) Pulse via Oximetry: 81 beats per minute (19) O2 Device: Ventimask (19) O2 Flow Rate (L/min): 15 l/min (19) O2 Temperature: (.) (09/28/19 2245) FIO2 (%): 50 % (09/29/19 0047) Physical Exam: 
 
General:          Well nourished. Alert. ENT:                  Moist mucous membranes CV:                  RRR. No murmur, rub, or gallop. Lungs:             Diminished breath sounds b/l. Minimal expiratory wheezing b/l. No rhonchi Abdomen:        Soft, nontender, nondistended. Bowel sounds normal.  
Extremities:     Warm and dry. No cyanosis or edema. Neurologic:       No gross focal deficits Skin:                R leg with abscess on medial knee with no fluctuance. L tight with two small lesions  located ant and post aspect. Ant: is dry with minimal erythema. Post still having some erythema and draining brownish fluids. DIAGNOSTIC STUDIES Data Review:  
Recent Results (from the past 24 hour(s)) Ethan Bottoms Collection Time: 09/29/19  3:12 AM  
Result Value Ref Range Vancomycin,trough 16.4 5 - 20 ug/mL CBC WITH AUTOMATED DIFF Collection Time: 09/29/19  3:12 AM  
Result Value Ref Range WBC 13.7 (H) 4.3 - 11.1 K/uL  
 RBC 4.00 (L) 4.23 - 5.6 M/uL  
 HGB 11.5 (L) 13.6 - 17.2 g/dL HCT 39.7 (L) 41.1 - 50.3 % MCV 99.3 (H) 79.6 - 97.8 FL  
 MCH 28.8 26.1 - 32.9 PG  
 MCHC 29.0 (L) 31.4 - 35.0 g/dL  
 RDW 14.3 11.9 - 14.6 % PLATELET 165 122 - 227 K/uL MPV 10.0 9.4 - 12.3 FL ABSOLUTE NRBC 0.00 0.0 - 0.2 K/uL  
 DF AUTOMATED NEUTROPHILS 87 (H) 43 - 78 % LYMPHOCYTES 5 (L) 13 - 44 % MONOCYTES 7 4.0 - 12.0 % EOSINOPHILS 0 (L) 0.5 - 7.8 % BASOPHILS 0 0.0 - 2.0 % IMMATURE GRANULOCYTES 1 0.0 - 5.0 %  
 ABS. NEUTROPHILS 11.9 (H) 1.7 - 8.2 K/UL  
 ABS. LYMPHOCYTES 0.7 0.5 - 4.6 K/UL  
 ABS. MONOCYTES 0.9 0.1 - 1.3 K/UL  
 ABS. EOSINOPHILS 0.0 0.0 - 0.8 K/UL  
 ABS. BASOPHILS 0.0 0.0 - 0.2 K/UL  
 ABS. IMM. GRANS. 0.2 0.0 - 0.5 K/UL METABOLIC PANEL, BASIC Collection Time: 09/29/19  3:12 AM  
Result Value Ref Range Sodium 147 (H) 136 - 145 mmol/L Potassium 4.0 3.5 - 5.1 mmol/L Chloride 105 98 - 107 mmol/L  
 CO2 38 (H) 21 - 32 mmol/L Anion gap 4 (L) 7 - 16 mmol/L Glucose 95 65 - 100 mg/dL BUN 17 8 - 23 MG/DL Creatinine 0.84 0.8 - 1.5 MG/DL  
 GFR est AA >60 >60 ml/min/1.73m2 GFR est non-AA >60 >60 ml/min/1.73m2 Calcium 9.1 8.3 - 10.4 MG/DL All Micro Results Procedure Component Value Units Date/Time CULTURE, BLOOD [[id number]] Collected:  09/27/19 0449 Order Status:  Completed Specimen:  Blood Updated:  09/29/19 1244 Special Requests: --     
  LEFT Antecubital 
  
  Culture result: NO GROWTH 2 DAYS     
 CULTURE, BLOOD [[id number]] Collected:  09/27/19 0451 Order Status:  Completed Specimen:  Blood Updated:  09/29/19 1244 Special Requests: --     
  LEFT 
HAND Culture result: NO GROWTH 2 DAYS     
 CULTURE, URINE [[id number]]  (Abnormal)  (Susceptibility) Collected:  09/25/19 1646 Order Status:  Completed Specimen:  Cath Urine Updated:  09/28/19 5084 Special Requests: NO SPECIAL REQUESTS Culture result:    
  10,000 to 50,000 COLONIES/mL ESCHERICHIA COLI  
     
 CULTURE, BLOOD [[id number]]  (Abnormal)  (Susceptibility) Collected:  09/25/19 1812 Order Status:  Completed Specimen:  Blood Updated:  09/28/19 0848 Special Requests: --     
  LEFT 
HAND 
  
  GRAM STAIN GRAM NEGATIVE RODS AEROBIC BOTTLE POSITIVE RESULTS VERIFIED, PHONED TO AND READ BACK BY Estrella Rader RN AT 0141 09/26/2019 BY CDTAYLOR Culture result: ESCHERICHIA COLI     
   REFER TO Zeb BRADFORD [id number] CULTURE, BLOOD [[id number]]  (Abnormal) Collected:  09/25/19 1427 Order Status:  Completed Specimen:  Blood Updated:  09/28/19 0848 Special Requests: RIGHT ANTECUBITAL     
  GRAM STAIN    
  GRAM NEGATIVE RODS ANAEROBIC BOTTLE POSITIVE   CRITICAL RESULT NOT CALLED DUE TO PREVIOUS NOTIFICATION OF CRITICAL RESULT WITHIN THE LAST 24 HOURS. Culture result: [id number] [facility] REFER TO [address] [id number] BLOOD CULTURE ID PANEL [[id number]]  (Abnormal) Collected:  09/25/19 1812 Order Status:  Completed Specimen:  Blood Updated:  09/28/19 2302 Acc. no. from [employer] [id number] Escherichia coli DETECTED Comment: RESULTS VERIFIED, PHONED TO AND READ BACK BY 
Leslie Betancourt RN @ 1300 ON 9/26/2019 BY REGLA 
  
  
  KPC (Carbapenem Resistance Gene) NOT DETECTED Comment: WARNING:  A Not Detected result for the KPC gene does not indicate susceptibility to carbapenems. Gram negative bacteria can be resistant to carbapenems by mechanisms other than carrying the KPC gene. INTERPRETATION Gram negative darell. Identified by realtime PCR as E. coli BLOOD CULTURE ID PANEL [[id number]]  (Abnormal) Collected:  09/25/19 1427 Order Status:  Completed Specimen:  Blood Updated:  09/28/19 7816 Acc. no. from [employer] [id number] Escherichia coli DETECTED Comment: RESULTS VERIFIED, PHONED TO AND READ BACK BY 
BRI DINH,ZAYDA @ 0018 ON [id number] BY MARLI 
  
  
  KPC (Carbapenem Resistance Gene) NOT DETECTED Comment: WARNING:  A Not Detected result for the KPC gene does not indicate susceptibility to carbapenems. Gram negative bacteria can be resistant to carbapenems by mechanisms other than carrying the KPC gene. INTERPRETATION Gram negative darell. Identified by realtime PCR as E. coli Imaging Daphene Glance /Studies: CXR Results  (Last 48 hours) None CT Results  (Last 48 hours) None No results found. No results found for this visit on 09/25/19. Labs and Studies from previous 24 hours have been personally reviewed by myself   
ASSESSMENT Active Hospital Problems Diagnosis Date Noted  Cellulitis of right leg 09/25/2019  
 History of pneumothorax 06/19/2019 Last Assessment & Plan: History of pneumothorax with lobectomy in the past on the right side  Polio 06/19/2019  Chronic hypoxemic respiratory failure ([address] 01/29/2019 Last Assessment & Plan:  
Continue with supplemental oxygen  Debility 01/16/2019 Last Assessment & Plan: Supportive care, getting home care, monitor. Hospital Problems as of 9/29/2019 Never Reviewed Codes Class Noted - Resolved POA * (Principal) Cellulitis of right leg ICD-10-CM: L03.115 ICD-9-CM: 682.6  9/25/2019 - Present Unknown History of pneumothorax ICD-10-CM: Z87.09 
ICD-9-CM: V12.69  6/19/2019 - Present Yes Overview Signed 6/19/2019 11:31 AM by Mery Sarah NP Last Assessment & Plan:  
History of pneumothorax with lobectomy in the past on the right side Polio ICD-10-CM: A80.9 ICD-9-CM: 045.90  6/19/2019 - Present Yes Chronic hypoxemic respiratory failure [facility]) ICD-10-CM: J96.11 
ICD-9-CM: 518.83, 799.02  1/29/2019 - Present Yes Overview Signed 6/19/2019 11:31 AM by Mery Sarah NP Last Assessment & Plan:  
Continue with supplemental oxygen Debility ICD-10-CM: R53.81 ICD-9-CM: 799.3  1/16/2019 - Present Yes Overview Signed 6/19/2019 11:31 AM by Mery Sarah NP Last Assessment & Plan: Supportive care, getting home care, monitor. A/P: 
 
-Celullitis R leg and L tight S/p Ortho eval 
On vanco and ceftriaxone day #5 Switch abxs to bactrim and Keflex PO. Patient will need at least 7 more days of PO abxs for cellulitis and bacteremia 
 
-E.coli Bacteremia 
?secondary to cellulitis and/or  UTI Repeated BC NGTD Cont abxs above -?UTI WBC  with positive nitrate 
ucx ecoli Cont abxs above 
  
-Severe COPD Stable Cont nebs and home dose prednisone Supportive care with morphine and ativan  
  
-Debility Hospice evaluation appreciated Possible discharge to hospice house tomorrow Patient agreed with plan of care. 
  
 -DVT ppx: heparin Code status: DNR 
  
  
 
Josiah Rodriguez MD 
09/29/19

## 2019-09-29 NOTE — PROGRESS NOTES
Alert, talking to nurse with dyspnea, agreeing he is able to take Klonopin PO; Morphine 2 mg given IVP slowly for c/o BLE pain, after starting new IV right wrist.

## 2019-09-29 NOTE — PROGRESS NOTES
Lab Results Component Value Date/Time Vancomycin,trough 16.4 09/29/2019 03:12 AM  
 
Day 5 of 7 vancomycin for SSTI. Goal trough levels are 15-20. We will continue the dose of vancomycin 1g q12h. Thank you, Jabier Pemberton,

## 2019-09-29 NOTE — PROGRESS NOTES
Remains awake, skin warm, dry with no further c/o discomfort. O2 increased by RT to venturi mask on 15L at 50%. No distress.

## 2019-09-29 NOTE — PROGRESS NOTES
PRN Morphine effective. Pt resting quietly with eyes closed. Respirations unlabored. No visual signs of pain or distress. Will continue to monitor.

## 2019-09-29 NOTE — PROGRESS NOTES
PRN Morphine 2 mg given slow, IVP per MD order for shortness of breath. Safety measures in place, call light within reach, door remaining open. Will continue to monitor.

## 2019-09-30 PROBLEM — J96.92 HYPERCAPNIC RESPIRATORY FAILURE (HCC): Status: ACTIVE | Noted: 2019-01-01

## 2019-09-30 NOTE — DISCHARGE SUMMARY
Hospitalist Discharge Summary Admit Date:  2019  2:20 PM  
Name:  Rusty Martinez. Age:  68 y.o. 
:  1950 MRN:  [id number] PCP:  Tor Torres MD 
Treatment Team: Attending Provider: Warren Berry MD; Consulting Provider: Srini Casas MD; Care Manager: Maisha Parada, RN; Utilization Review: Phylicia Trujillo Problem List for this Hospitalization: 
Hospital Problems as of 2019 Never Reviewed Codes Class Noted - Resolved POA * (Principal) Cellulitis of right leg ICD-10-CM: L03.115 ICD-9-CM: 682.6  2019 - Present Unknown History of pneumothorax ICD-10-CM: Z87.09 
ICD-9-CM: V12.69  2019 - Present Yes Overview Signed 2019 11:31 AM by Eboni Lamb NP Last Assessment & Plan:  
History of pneumothorax with lobectomy in the past on the right side Polio ICD-10-CM: A80.9 ICD-9-CM: 045.90  2019 - Present Yes Chronic hypoxemic respiratory failure [facility]) ICD-10-CM: J96.11 
ICD-9-CM: 518.83, 799.02  2019 - Present Yes Overview Signed 2019 11:31 AM by Eboni Lamb NP Last Assessment & Plan:  
Continue with supplemental oxygen Debility ICD-10-CM: R53.81 ICD-9-CM: 799.3  2019 - Present Yes Overview Signed 2019 11:31 AM by Eboni Lamb NP Last Assessment & Plan: Supportive care, getting home care, monitor. Admission HPI from 2019:   
\" Please refer to HPI for more details \". Hospital Course: 
 
68years old M with PMH of severe COPD on 3L O2/ hospice, anxiety, pneumothorax, Polio presented to the hospital complaining of R leg tenderness for almost 1 week. Patient reported his lung doctor told him he has only 6 more month to live due to severe COPD. In the ED patient had elevated WBC to 18 and findings of cellulitis of R leg. Ortho reported patient does not have septic arthritis.   Patient was having acute on chronic respi failure on airvo weaned to [place]. Patient was also having UTI and bacteremia, repeated blood cultures no growth. Bacteremia likely related to UTI. Patient clinically improved and antibiotics were transitioned to PO. Due to patient complexity and worsening symptoms at home hospice accepted patient at hospice house. Patient understood plan of care and agreed. Patient needs to complete antibiotics course and needs wound care from draining lesions. Follow up instructions below. Plan was discussed with patient/family/careteam  All questions answered. Patient was stable at time of discharge and was instructed to call or return if there are any concerns or recurrence of symptoms. Diagnostic Imaging/Tests:  
 
CT CHEST 
  
INDICATION: Weakness, bullous change versus pneumothorax noted on chest x-ray 
  
Multiple axial images were obtained through the chest without IV contrast.  
Radiation dose reduction techniques were used for this study: All CT scans 
performed at this facility use one or all of the following: Automated exposure 
control, adjustment of the mA and/or kVp according to patient's size, iterative 
reconstruction.  
  
COMPARISON: Earlier chest x-ray 
  
FINDINGS: 
-LUNGS: There is bullous change in both lung bases, left more than right. This 
accounts for the area of lucency seen on the chest x-ray. There is no 
pneumothorax. There is an area of linear density in the left upper lobe 
adjacent to the fissure, most likely scarring or atelectasis. -MEDIASTINUM/AXILLA: No significant adenopathy. 
  
-HEART/VESSELS: Normal. 
-CHEST WALL/BONES: Bones are osteopenic. No fractures. -UPPER ABDOMEN: No acute findings.  
  
IMPRESSION IMPRESSION:  
1.  Bullous change in both lung bases, left greater than right. 2.  Linear scarring/atelectasis in the left upper lobe adjacent to the fissure. HISTORY: \"Pimple\" about the right knee, one week duration with increasing redness and tenderness. History of MRSA infection. 
  
EXAM: MRI right knee with and without contrast 
  
TECHNIQUE: Multiplanar multisequence imaging is performed both before and after 
the uneventful administration of 15 mL Dotarem. 
  
COMPARISON: No comparison. 
  
FINDINGS: There is a subcutaneous abscess present within the anteromedial 
subcutaneous soft tissues, demonstrating peripheral enhancement, measuring 0.9 x 
4.1 x 5.3 cm. There is edema and enhancement in the adjacent soft tissues. 
  
There is a large lobular T2 hyperintense lesion within the distal femoral 
diametaphysis, felt to represent a large enchondroma, measuring 6.7 cm in 
maximal craniocaudal dimension. 
  
There is a horizontal cleavage tear involving the posterior horn of the medial 
meniscus. The MCL is intact. The cartilage of the right knee is intact. There is 
no significant joint effusion. 
  
IMPRESSION IMPRESSION: 
1. Subcutaneous abscess, anteromedial, involving the superficial subcutaneous 
soft tissues. There is adjacent cellulitis. 2. Enchondroma within the distal femoral diametaphysis. 3. Horizontal cleavage tear involving the posterior horn of the medial meniscus. All Micro Results Procedure Component Value Units Date/Time CULTURE, BLOOD [[id number]] Collected:  09/27/19 0449 Order Status:  Completed Specimen:  Blood Updated:  09/30/19 0446 Special Requests: --     
  LEFT Antecubital 
  
  Culture result: NO GROWTH 3 DAYS     
 CULTURE, BLOOD [[id number]] Collected:  09/27/19 0451 Order Status:  Completed Specimen:  Blood Updated:  09/30/19 0446 Special Requests: --     
  LEFT 
HAND Culture result: NO GROWTH 3 DAYS     
 CULTURE, URINE [[id number]]  (Abnormal)  (Susceptibility) Collected:  09/25/19 1646 Order Status:  Completed Specimen:  Cath Urine Updated:  09/28/19 1971 Special Requests: NO SPECIAL REQUESTS Culture result: 10,000 to 50,000 COLONIES/mL ESCHERICHIA COLI  
     
 CULTURE, BLOOD [[id number]]  (Abnormal)  (Susceptibility) Collected:  09/25/19 1812 Order Status:  Completed Specimen:  Blood Updated:  09/28/19 0848 Special Requests: --     
  LEFT 
HAND 
  
  GRAM STAIN GRAM NEGATIVE RODS AEROBIC BOTTLE POSITIVE RESULTS VERIFIED, PHONED TO AND READ BACK BY Sophia Sanchez RN AT 7379 09/26/2019 BY [email or web address] Culture result: ESCHERICHIA COLI     
   REFER TO [facility] [address] PANEL [id number] CULTURE, BLOOD [[id number]]  (Abnormal) Collected:  09/25/19 1427 Order Status:  Completed Specimen:  Blood Updated:  09/28/19 0848 Special Requests: RIGHT ANTECUBITAL     
  GRAM STAIN    
  GRAM NEGATIVE RODS ANAEROBIC BOTTLE POSITIVE CRITICAL RESULT NOT CALLED DUE TO PREVIOUS NOTIFICATION OF CRITICAL RESULT WITHIN THE LAST 24 HOURS. Culture result: [id number] [facility] REFER TO [address] [id number] BLOOD CULTURE ID PANEL [[id number]]  (Abnormal) Collected:  09/25/19 1812 Order Status:  Completed Specimen:  Blood Updated:  09/28/19 6884 Acc. no. from [facility] [id number] Escherichia coli DETECTED Comment: RESULTS VERIFIED, PHONED TO AND READ BACK BY 
Cherelle Mccracken RN @ 1300 ON 9/26/2019 BY REGLA 
  
  
  KPC (Carbapenem Resistance Gene) NOT DETECTED Comment: WARNING:  A Not Detected result for the KPC gene does not indicate susceptibility to carbapenems. Gram negative bacteria can be resistant to carbapenems by mechanisms other than carrying the KPC gene. INTERPRETATION Gram negative darell. Identified by realtime PCR as E. coli BLOOD CULTURE ID PANEL [[id number]]  (Abnormal) Collected:  09/25/19 1427 Order Status:  Completed Specimen:  Blood Updated:  09/28/19 9887 Acc. no. from [facility] [id number] Escherichia coli DETECTED   Comment: RESULTS VERIFIED, PHONED TO AND READ BACK BY 
BRI DINHRN @ 0018 ON [id number] BY Marylu Maher 
 KPC (Carbapenem Resistance Gene) NOT DETECTED Comment: WARNING:  A Not Detected result for the KPC gene does not indicate susceptibility to carbapenems. Gram negative bacteria can be resistant to carbapenems by mechanisms other than carrying the KPC gene. INTERPRETATION Gram negative darell. Identified by realtime PCR as E. coli Labs: Results:  
   
BMP, Mg, Phos Recent Labs  
  09/30/19 
0507 09/29/19 
0312 09/28/19 
0539  147* 145  
K 3.8 4.0 4.4  105 107 CO2 36* 38* 36* AGAP 0* 4* 2* BUN 15 17 18 CREA 0.84 0.84 1.00  
CA 8.9 9.1 9.2 GLU 88 95 113* CBC Recent Labs  
  09/30/19 
0507 09/29/19 
8635 09/28/19 
0539 WBC 9.7 13.7* 13.3*  
RBC 4.01* 4.00* 4.08* HGB 11.4* 11.5* 11.5* HCT 39.1* 39.7* 40.1*  
 374 330 GRANS 83* 87* 88* LYMPH 9* 5* 5* EOS 1 0* 0*  
MONOS 6 7 5  
BASOS 0 0 0 IG 1 1 1 ANEU 8.1 11.9* 11.7* ABL 0.8 0.7 0.7 RAJNI 0.1 0.0 0.0 ABM 0.6 0.9 0.7 ABB 0.0 0.0 0.0 AIG 0.1 0.2 0.2 LFT No results for input(s): SGOT, ALT, TBIL, AP, TP, ALB, GLOB, AGRAT, GPT in the last 72 hours. Cardiac Testing No results found for: BNPP, BNP, CPK, RCK1, RCK2, RCK3, RCK4, CKMB, CKNDX, CKND1, TROPT, TROIQ Coagulation Tests No results found for: PTP, INR, APTT, INREXT, INREXT A1c No results found for: HBA1C, HGBE8, YOE0ISLP, TOH9SUDK Lipid Panel No results found for: CHOL, CHOLPOCT, CHOLX, CHLST, CHOLV, [id number], HDL, HDLP, LDL, LDLC, DLDLP, [id number], VLDLC, VLDL, TGLX, TRIGL, TRIGP, TGLPOCT, CHHD, CHHDX Thyroid Panel No results found for: T4, T3U, TSH, TSHEXT, TSHEXT Most Recent UA Lab Results Component Value Date/Time  Color YELLOW 09/25/2019 04:46 PM  
 Appearance CLOUDY 09/25/2019 04:46 PM  
 Specific gravity 1.031 (H) 09/25/2019 04:46 PM  
 pH (UA) 5.0 09/25/2019 04:46 PM  
 Protein TRACE (A) 09/25/2019 04:46 PM  
 Glucose NEGATIVE  09/25/2019 04:46 PM  
 Ketone NEGATIVE  09/25/2019 04:46 PM  
 Bilirubin NEGATIVE  09/25/2019 04:46 PM  
 Blood SMALL (A) 09/25/2019 04:46 PM  
 Urobilinogen 0.2 09/25/2019 04:46 PM  
 Nitrites POSITIVE (A) 09/25/2019 04:46 PM  
 Leukocyte Esterase MODERATE (A) 09/25/2019 04:46 PM  
  
 
No Known Allergies Immunization History Administered Date(s) Administered  TB Skin Test (PPD) Intradermal 09/26/2019 All Labs from Last 24 Hrs: 
Recent Results (from the past 24 hour(s)) CBC WITH AUTOMATED DIFF Collection Time: 09/30/19  5:07 AM  
Result Value Ref Range WBC 9.7 4.3 - 11.1 K/uL  
 RBC 4.01 (L) 4.23 - 5.6 M/uL  
 HGB 11.4 (L) 13.6 - 17.2 g/dL HCT 39.1 (L) 41.1 - 50.3 % MCV 97.5 79.6 - 97.8 FL  
 MCH 28.4 26.1 - 32.9 PG  
 MCHC 29.2 (L) 31.4 - 35.0 g/dL  
 RDW 13.8 11.9 - 14.6 % PLATELET 204 757 - 300 K/uL MPV 9.9 9.4 - 12.3 FL ABSOLUTE NRBC 0.00 0.0 - 0.2 K/uL  
 DF AUTOMATED NEUTROPHILS 83 (H) 43 - 78 % LYMPHOCYTES 9 (L) 13 - 44 % MONOCYTES 6 4.0 - 12.0 % EOSINOPHILS 1 0.5 - 7.8 % BASOPHILS 0 0.0 - 2.0 % IMMATURE GRANULOCYTES 1 0.0 - 5.0 %  
 ABS. NEUTROPHILS 8.1 1.7 - 8.2 K/UL  
 ABS. LYMPHOCYTES 0.8 0.5 - 4.6 K/UL  
 ABS. MONOCYTES 0.6 0.1 - 1.3 K/UL  
 ABS. EOSINOPHILS 0.1 0.0 - 0.8 K/UL  
 ABS. BASOPHILS 0.0 0.0 - 0.2 K/UL  
 ABS. IMM. GRANS. 0.1 0.0 - 0.5 K/UL METABOLIC PANEL, BASIC Collection Time: 09/30/19  5:07 AM  
Result Value Ref Range Sodium 141 136 - 145 mmol/L Potassium 3.8 3.5 - 5.1 mmol/L Chloride 105 98 - 107 mmol/L  
 CO2 36 (H) 21 - 32 mmol/L Anion gap 0 (L) 7 - 16 mmol/L Glucose 88 65 - 100 mg/dL BUN 15 8 - 23 MG/DL Creatinine 0.84 0.8 - 1.5 MG/DL  
 GFR est AA >60 >60 ml/min/1.73m2 GFR est non-AA >60 >60 ml/min/1.73m2 Calcium 8.9 8.3 - 10.4 MG/DL Discharge Exam: 
Patient Vitals for the past 24 hrs: 
 Temp Pulse Resp BP SpO2  
09/30/19 0818     98 %  
09/29/19 2341 95.4 °F (35.2 °C) 70 18 95/69 94 % 09/29/19 2042  89 20 138/80 93 % 09/1950     97 % 09/29/19 [address]  97 % 09/29/19 1903     95 % 09/29/19 1604 97.8 °F (36.6 °C) 97 18 160/86 93 % 09/29/19 1454     94 % 09/29/19 1154 97.7 °F (36.5 °C) 74 18 115/72 96 % Oxygen Therapy O2 Sat (%): 98 % (09/30/19 0818) Pulse via Oximetry: 92 beats per minute (09/30/19 0818) O2 Device: Ventimask (09/30/19 0818) O2 Flow Rate (L/min): 15 l/min (09/30/19 0818) O2 Temperature: 87.8 °F (31 °C) (09/29/19 1943) FIO2 (%): 50 % (09/30/19 0818) Intake/Output Summary (Last 24 hours) at 9/30/2019 8324 Last data filed at 9/30/2019 4923 Gross per 24 hour Intake  Output 425 ml Net -425 ml Patient reported was having insomnia last night. Patient denies SOB or any other complaints. General:          Well nourished.  Alert.   
ENT:                  Moist mucous membranes CV:                  RRR.  No murmur, rub, or gallop.   
Lungs:             Diminished breath sounds b/l. Minimal expiratory wheezing b/l. No rhonchi  
Abdomen:        Soft, nontender, nondistended. Bowel sounds normal.  
Extremities:     Warm and dry.  No cyanosis or edema. Neurologic:       No gross focal deficits Skin:                R leg with lesion on medial knee with minimal drainage. .R tight with two small lesions ant and post aspect. Ant lesion with no drainage or erythema. Post lesion with some drainage and less erythema. Discharge Info:  
Current Discharge Medication List  
  
START taking these medications Details  
cephALEXin (KEFLEX) 500 mg capsule Take 1 Cap by mouth every six (6) hours for 6 days. Qty: 24 Cap, Refills: 0  
  
guaiFENesin-dextromethorphan (ROBITUSSIN DM) 100-10 mg/5 mL syrup Take 10 mL by mouth every four (4) hours as needed for Cough for up to 10 days. Qty: 1 Bottle, Refills: 0 Saccharomyces boulardii (FLORASTOR) 250 mg capsule Take 2 Caps by mouth daily for 7 days. Qty: 14 Cap, Refills: 0 trimethoprim-sulfamethoxazole (BACTRIM DS, SEPTRA DS) 160-800 mg per tablet Take 1 Tab by mouth every twelve (12) hours for 6 days. Qty: 12 Tab, Refills: 0 CONTINUE these medications which have NOT CHANGED Details  
benzonatate (TESSALON) 200 mg capsule Take 200 mg by mouth three (3) times daily. Pt also takes additional dose during night if needed for severe cough  
  
predniSONE (DELTASONE) 10 mg tablet Take 20 mg by mouth daily. Indications: Dyspnea  
  
raNITIdine (ZANTAC) 150 mg tablet Take 150 mg by mouth daily. Indications: heartburn  
  
pramipexole (MIRAPEX) 1 mg tablet Take 1 mg by mouth nightly. clonazePAM (KLONOPIN) 1 mg tablet Take 1 mg by mouth three (3) times daily. Pt takes at 0900, 1500, 2100  Indications: Anxiety, Agitation  
  
raNITIdine (ZANTAC) 15 mg/mL syrup Take 150 mg by mouth two (2) times a day. traZODone (DESYREL) 150 mg tablet Take 150 mg by mouth nightly. Wilberara sends a 150mg tablet  
  
acetaminophen (TYLENOL) 325 mg tablet Take 650 mg by mouth every four to six (4-6) hours as needed. sertraline (ZOLOFT) 100 mg tablet Take 100 mg by mouth daily. fluticasone propion-salmeterol (ADVAIR/WIXELA) 250-50 mcg/dose diskus inhaler Take 1 Puff by inhalation two (2) times a day. OXYGEN-AIR DELIVERY SYSTEMS 4 L/min by Nasal route continuous. no humidity  
  
ibuprofen (MOTRIN) 200 mg tablet Take 200 mg by mouth every four (4) hours as needed for Pain. senna (SENNA) 8.6 mg tablet Take 1 Tab by mouth daily. !! albuterol-ipratropium (DUO-NEB) 2.5 mg-0.5 mg/3 ml nebu 3 mL by Nebulization route three (3) times daily. !! albuterol-ipratropium (DUO-NEB) 2.5 mg-0.5 mg/3 ml nebu 3 mL by Nebulization route three (3) times daily as needed for Other (dyspnea/wheezing). !! - Potential duplicate medications found. Please discuss with provider. Disposition: Hospice house Activity: Activity as tolerated Diet: GI soft Follow-up Information Follow up With Specialties Details Why Contact Info Antonella Delgadillo MD Geriatric Medicine   [address]  [address] 
[phone] Signed: Deandre Lozano MD

## 2019-09-30 NOTE — PROGRESS NOTES
Pt placed back on AIRVO. Very aggitated about wearing venti mask, not being able to eat. Weaned airvo flow to 35L due to pt complaining about nasal irritation. Will continue to monitor. fiO2 46%

## 2019-09-30 NOTE — PROGRESS NOTES
1230Iv flushed and pt given iv morphine 2 mg for symptom management of SOB. 1300 reassessed pt pain 0/10. No current needs. 1400 Pt observed and appears to be sleeping. No symptoms to manage at this time. Flacc 0/10  1621 po medication given. Pt having SOB and pain. Morphine given for symptom management of those symptoms.

## 2019-09-30 NOTE — PROGRESS NOTES
Resting quietly in bed, resp labored, O2 off, reapplied Ventri mask 15L at 50%, anxious. Ativan 0.5mg given IVP slowly to calm. Assessment noted. Generalized weakness noted. Asst with repositioning, higher in bed after sitting quietly with O2, encouraged voice rest to recover from dyspnea and facilitate breathing. Skin warm, dry. BLE dsgs clean, dry, intact. RLE with areas of redness noted. Reminded to keep O2 on, agreed.

## 2019-09-30 NOTE — PROGRESS NOTES
Resting quietly, resp labored with minimal exertion, O2 intact during report given to Virgil Severance, RN. No distress noted.

## 2019-09-30 NOTE — PROGRESS NOTES
Pt crying out, pt airvo had fallen off face. Assisted pt to put O2 back on, pt SAT 83%, slowly up to 92%, with pursed lip breathing.

## 2019-09-30 NOTE — PROGRESS NOTES
Care Management Interventions PCP Verified by CM: Yes Mode of Transport at Discharge: BLS Transition of Care Consult (CM Consult): Home Hospice [facility] HSPTL: Yes Current Support Network: Lives with Caregiver Confirm Follow Up Transport: Family Plan discussed with Pt/Family/Caregiver: Yes Freedom of Choice Offered: Yes Discharge Location Discharge Placement: Home with hospice SW received call from [facility] with [facility] who states pt is accepted to [facility] and request transport for 11. Family at bedside & aware. Nsg called report, transport arranged to [facility].

## 2019-09-30 NOTE — HOSPICE
[facility]- 
 
Pt to transport to the [facility] at 1100 this am to room 115. Pt's sister is aware and Deneen SESAY setting up transport. Thank you for this referral- 
 
Deonna Jimenez RN BSN Hospice Liaison [phone]

## 2019-09-30 NOTE — HSP[facility] IDG NURSE NOTES
1141: Pt arrived via EMS. Sister, Mildred Ruiz following behind. 68year old male patient admitted to room 115 with hospice diagnosis of hypercapneic respiratory failure. Level of care is general inpatient for management of pain, dyspnea, agitation, wound care. Description: Pt is on 15 L venturi mask continuous and per discharging RN, Pt de-sats when off the O2 but quickly recovers once placed back on O2. Pt removes mask at times out of confusion and becomes more confused. Needs reorientation and reassurance. Recent HPI: Pt is positive for ecoli in blood and urine. Castillo catheter containing UOP and wounds to BLE most likely with MRSA infxn. Current symptom: pain and dyspnea and anxiety    Functional status:Pt becomes extremely anxious and SOB with any activity so activity is limited to BR. Castillo cathter in place. PO intake:Regular diet but unable to tolerate much eating d/t amount of O2 required continuously. Code status:DNR    Other PMH:Neurogenic bladder, polio, myelitis, and history of ETOH and benzo use    Family dynamics:Currently been residing with sister, Mildred Ruiz but the relationship is strained at times. Both have their own issues to deal with. Sister lost spouse. Pt has been  twice from same lady     plans: Unknown at this time. Wound care: 1600: Wound care completed with Angela Yates, CARRIE and Ezio Tovar student at bedside assisting. Please see media pictures. Wound to inner right knee 3 cm x 2.5 cm open with slough present and pustulant, serosanguinous drainage noted. Covered with telfa and wrapped in gauze with paper tape to secure. Wound to left upper posterior leg 1 cm x 1 cm with pustulant drainage noted. Covered with mepilex. Pt tolerated well and stated pain 4/10 post procedure. Pt requested medication for anxiety and this was relayed to [facility], Primary RN. Sister left shortly after arrival stating she was \"completely wiped out and must go home for a nap\".  RN requested home meds and Sister stated she did not bring them and would have to bring them after her nap. Admission complete and initial general hospice care plan initiated which includes spiritual, psychosocial, and bereavement. Immediate needs recognized for wound care, family care including social work and possible difficulty with bereavement on sister's part when EOL nears. IDG team made aware of plan of care and immediate needs.

## 2019-09-30 NOTE — PROGRESS NOTES
Assessment complete. Pt A&Ox3,  Respirations shallow, diminished in bases. S1 S2 auscultated. Skin warm, dry. BLE dsgs clean, dry, intact. RLE with areas of redness noted. Pt has active bowel sounds. Call light in reach.

## 2019-09-30 NOTE — PROGRESS NOTES
Continues to rest quietly, awaking at freq intervals, for reapplying O2, coming off during sleep at times. No c/o.  Resp remain dyspneic with minimal exertion, improved at rest.

## 2019-10-01 NOTE — [facility] IDG SOCIAL WORKER NOTES
Patient: Paige Navas. Date: 10/01/19  Time: 9:08 AM    [facility]  Notes  LMSW has reviewed the initial Rn Comprehensive assessment and plan of care. Acknowledge there is no immediate needs for SW.        Signed by: Yelena De La Fuente

## 2019-10-01 NOTE — PROGRESS NOTES
1850: report received from off going RN. Patient GIP level of care with diagnosis of respiratory failure. Patient receiving parenteral and PO medications PO for pain, dyspnea and agitation. 2000: Physical assessment complete. Wound to right inner knee dressed with foam dressing. Patient denies any pain or discomfort at this time. Rates 0/10 per verbal scale. Scheduled duo neb administered as well as PO medications. Patient able swallow PO medications whole without difficulty. 2130: Scheduled HS meds administered whole without difficulty. Requested and received a cup of pudding. Oxymizer insitu and infusing 7L/min oxygen. Rates pain 0/10 at this time. No complaints of shortness of breath. 2140: report given to night shift RN.

## 2019-10-01 NOTE — PROGRESS NOTES
Problem: Risk for Spread of Infection  Goal: Prevent transmission of infectious organism to others  Description  Prevent the transmission of infectious organisms to other patients, staff members, and visitors. Outcome: Progressing Towards Goal     Problem: Patient Education:  Go to Education Activity  Goal: Patient/Family Education  Outcome: Resolved/Met     Problem: Falls - Risk of  Goal: *Absence of Falls  Description  Pt will remain free from falls aeb no injuries while at [facility]. Outcome: Progressing Towards Goal  Note:   Fall Risk Interventions:  Mobility Interventions: Bed/chair exit alarm    Mentation Interventions: Bed/chair exit alarm, Adequate sleep, hydration, pain control, Evaluate medications/consider consulting pharmacy, Door open when patient unattended, Eyeglasses and hearing aids, Room close to nurse's station, Toileting rounds, More frequent rounding, Familiar objects from home    Medication Interventions: Bed/chair exit alarm, Evaluate medications/consider consulting pharmacy    Elimination Interventions: Bed/chair exit alarm, Call light in reach, Toileting schedule/hourly rounds, Patient to call for help with toileting needs    History of Falls Interventions: Bed/chair exit alarm, Room close to nurse's station, Door open when patient unattended         Problem: Patient Education: Go to Patient Education Activity  Goal: Patient/Family Education  Outcome: Resolved/Met     Problem: Pressure Injury - Risk of  Goal: *Prevention of pressure injury  Description  Pt will remain free from/ any further pressure injuries aeb no/progression pressure sores while at [facility].      Outcome: Progressing Towards Goal  Note:   Pressure Injury Interventions:  Sensory Interventions: Assess changes in LOC, Check visual cues for pain, Float heels, Assess need for specialty bed    Moisture Interventions: Absorbent underpads, Moisture barrier, Maintain skin hydration (lotion/cream), Apply protective barrier, creams and emollients, Limit adult briefs, Assess need for specialty bed    Activity Interventions: Assess need for specialty bed    Mobility Interventions: Assess need for specialty bed, Float heels, HOB 30 degrees or less    Nutrition Interventions: Document food/fluid/supplement intake, Offer support with meals,snacks and hydration    Friction and Shear Interventions: Apply protective barrier, creams and emollients, Foam dressings/transparent film/skin sealants, Lift sheet, Minimize layers, Lift team/patient mobility team                Problem: Patient Education: Go to Patient Education Activity  Goal: Patient/Family Education  Outcome: Resolved/Met     Problem: Hospice Orientation  Goal: Demonstrate understanding of hospice philosophy, plan of care, and home hospice program  Description  The patient/family/caregiver will demonstrate understanding of hospice philosophy, plan of care and the home hospice program as evidenced by participation in meeting the patient's psychosocial, spiritual, medical, and physical needs inclusive of medical supplies/equipment focusing on symptoms. Outcome: Resolved/Met     Problem: Anxiety/Agitation  Goal: Verbalize and demonstrate ability to manage anxiety  Description  The patient/family/caregiver will verbalize and demonstrate ability to manage the patient's anxiety throughout hospice care. Outcome: Progressing Towards Goal     Problem: Pain  Goal: *Control of Pain  Description  Sandy Rubio will have pain control AEB a numeric score of 2 or less on a scale of 1-10.   Outcome: Progressing Towards Goal

## 2019-10-01 NOTE — PROGRESS NOTES
Background: Konrad Tubbs is a 68year old gentleman. He has been living with his sister Dayday Cancino and recently was treated at [facility]. He has been seeing Saji Camacho during his time in the [facility] Home [facility]. Patient is . He and his wife were  to each other twice and  twice. They had two children, a son who was killed by a drunk  at age 6. A daughter, Westley Wright who has a relationship with her dad that he calls a love/ hate relationship. He has one grandson. Mr. Magdalene Ormond has two living sisters. Mr. Chaitanya Franco dad was a professor. He describes his dad as a functional alcoholic. He says his mom was \" a pill taker\". Family life was a bit tumultuous. Mr. Magdalene Ormond says he feels guilty that he didn't take advantage of opportunities afforded him during his younger days. He was intelligent and achieved a great deal but then didn't continue to give his all. He himself has struggled with alcohol. He has admitted himself to treatment facilities on several occassions. He is a Worship. He believes strongly in God. He says he sees God in nature, he feels God and he knows God is with him. His grandfather was a Yarsanism . Mr. Magdalene Ormond was once in the [facility]  But a lack of understanding of his alcoholism caused him to leave the [employer]. Since that time he has maintained a personal relationship with God but avoided organized [employer]ist. Assessment:  listened as Mr. Magdalene Ormond spoke of his life long villarreal with pain which included his parents struggles with addiction, his own struggle with alcoholism, his failed marriage, the grief of loosing his son and his disappointment in himself for not living up to his own potential. He spoke of his relationship with his sister and his daughter. Mr. Magdalene Ormond appears to be doing his own life review. He is trying to work through regrets, disappointments and what he perceives as failures. He says little about accomplishments.   He recognizes his health is failing and he has little control over his decline. During this visit  listened to his story while attempting to affirm his strengths.  affirmed his willingness to check himself in to facilities when he needed help.  acknowledged the difficulties he encountered  growing up with parents who suffered with addiction.  affirmed Mr. Charis Deluna in God.  encouraged Mr. Mike Lamar to let go of the past, forgive himself and give himself credit for the things he has accomplished.  asked Mr. Mike Lamar if he would like for he and  to pray together.  took his hand ( he held tightly) as  They prayed together. He agreed to meet with  again. Plan:  will continue to provide a safe space for open nonjudgmental communication. Refer to care plan.

## 2019-10-01 NOTE — HOSPICE
Pt was offered air mattress for comfort and wound comfort but refused stating he \"was comfortable just the way he is, the mattress is fine\". Primary RN updated. Also, long conversation with Pt. Prompted by admission questions: Pt went into detailed hx. Pt appears to have some depression mixed with high anxiety. He was staying with sister, Rere Grandchild but that relationship sounds strained. Per Pt, Michelle Yuen is very helpful but drives [him] crazy\" but he \"tries to keep in mind all that she has done for him and he would not be alive without her. But she will try to give you a ton of medical advice and be all in your business\". He has been  twice from the same lady who eventually made him really mad and they just couldn't stand each other. He has been to [facility] but that just made [him] more angry\". He lost an \"7 yo son to a drunk \". \"Never gotten over it but I knew I had to keep going for my wife and daughter; we had the perfect family\". He admits to drinking \"whatever was available in the house\" and taking medications for increased anxiety. \"3 Xanax throughout the day and ambien at night just to sleep\".

## 2019-10-01 NOTE — H&P
History and Physical    Patient: Basil Osler. MRN: [id number]  SSN: [id number]    YOB: 1950  Age: 68 y.o. Sex: male      Subjective:      Basil Osler. is a 68 y.o. male who has a hospice diagnosis of hypercapnic respiratory failure. Hospice associated diagnoses are COPD, Gram-negative bacteremia, E. Coli UTI, soft tissue abscess, presumed MRSA, bullous emphysema, ME, respiratory acidosis, and hypoalbuminemia. Non associated diagnoses are Remote polio, myelitis, and remote alcohol abuse. He was under the care of [facility] when he revoked hospice care to be admitted to the hospital due to sepsis. This was found to be caused by E. Coli UTI. He was found to have a soft tissue abscess presumed to be MRSA. He was treated with IV antibiotics with some improvement in his condition. His sister, who is his poa, has elected to forgo further treatment and resume comfort measures with hospice care. Without further treatment, his life expectancy is less than 3 months. Patient admitted [facility]IP with hypercapnic respiratory failure for management of pain, dyspnea, agitation and wound care. Past Medical History:   Diagnosis Date    Alcohol use disorder, mild, in early remission, in controlled environment, abuse 6/23/2019    Benign hypertension 11/16/2018    Last Assessment & Plan:  Stable on Norvasc    Bladder outlet obstruction 11/16/2018    Last Assessment & Plan:  Indwelling cath, stable off abx, urology input noted.  BPH (benign prostatic hyperplasia) 6/19/2019    Chronic hypoxemic respiratory failure ([address] 1/29/2019    Last Assessment & Plan:  Continue with supplemental oxygen    COPD (chronic obstructive pulmonary disease) ([address] 11/16/2018    Last Assessment & Plan:  Severe. Patient currently not taking his Spiriva. Cost is an issue. We will change to Trelegy. Purpose and proper use of inhaler was shown with good return demonstration.   Continue with theophylline although patient says he does not think it helps    Depression 2019    Polio 2019    Post-polio syndrome 2018    Last Assessment & Plan:  Supportive care    Urethral obstruction 2019    UTI (urinary tract infection) 2019    Last Assessment & Plan:  Omnicef as directed, see urology as scheduled. Past Surgical History:   Procedure Laterality Date    HX APPENDECTOMY      HX OTHER SURGICAL      lung surgery    HX TONSILLECTOMY        Family History   Problem Relation Age of Onset    COPD Mother    [de-identified] Cancer Mother     Heart Disease Mother     Heart Disease Father     Other Father         pancreatitis    Asthma Sister     Diabetes Sister     Other Sister         fibromyalgia, restless leg syndrome    Hypertension Sister     Heart Disease Sister         mitral valve prolapse    Cancer Maternal Grandmother     Heart Disease Paternal Grandfather     No Known Problems Daughter     No Known Problems Son      Social History     Tobacco Use    Smoking status: Former Smoker     Packs/day: 0.50     Years: 40.00     Pack years: 20.00     Last attempt to quit: 2019     Years since quittin.4    Smokeless tobacco: Never Used   Substance Use Topics    Alcohol use: Not Currently     Comment: chronic past abuse      Prior to Admission medications    Medication Sig Start Date End Date Taking? Authorizing Provider   guaiFENesin-dextromethorphan (ROBITUSSIN DM) 100-10 mg/5 mL syrup Take 10 mL by mouth every four (4) hours as needed for Cough for up to 10 days. 9/30/19 10/10/19  Army Acosta MD   Saccharomyces boulardii (FLORASTOR) 250 mg capsule Take 2 Caps by mouth daily for 7 days. 10/1/19 10/8/19  Army Acosta MD   predniSONE (DELTASONE) 10 mg tablet Take 20 mg by mouth daily. Indications: Dyspnea    Provider, Historical   raNITIdine (ZANTAC) 150 mg tablet Take 150 mg by mouth daily.  Indications: heartburn 19   Provider, Historical   pramipexole (MIRAPEX) 1 mg tablet Take 1 mg by mouth nightly. 9/18/19   Provider, Historical   clonazePAM (KLONOPIN) 1 mg tablet Take 1 mg by mouth three (3) times daily. Pt takes at 0900, 1500, 2100  Indications: Anxiety, Agitation 9/11/19   Provider, Historical   traZODone (DESYREL) 150 mg tablet Take 150 mg by mouth nightly. Jayson Barnes sends a 150mg tablet 8/12/19   Provider, Historical   acetaminophen (TYLENOL) 325 mg tablet Take 650 mg by mouth every four to six (4-6) hours as needed. Provider, Historical   sertraline (ZOLOFT) 100 mg tablet Take 100 mg by mouth daily. 5/20/19   Provider, Historical   fluticasone propion-salmeterol (ADVAIR/WIXELA) 250-50 mcg/dose diskus inhaler Take 1 Puff by inhalation two (2) times a day. 5/20/19   Provider, Historical   OXYGEN-AIR DELIVERY SYSTEMS 4 L/min by Nasal route continuous. no humidity 9/18/19   Provider, Historical   ibuprofen (MOTRIN) 200 mg tablet Take 200 mg by mouth every four (4) hours as needed for Pain. 5/20/19   Provider, Historical   senna (SENNA) 8.6 mg tablet Take 1 Tab by mouth daily. 5/20/19   Provider, Historical   albuterol-ipratropium (DUO-NEB) 2.5 mg-0.5 mg/3 ml nebu 3 mL by Nebulization route three (3) times daily. 5/20/19   Provider, Historical   albuterol-ipratropium (DUO-NEB) 2.5 mg-0.5 mg/3 ml nebu 3 mL by Nebulization route three (3) times daily as needed for Other (dyspnea/wheezing). 5/20/19   Provider, Historical        No Known Allergies    Review of Systems:  C/o dyspnea. Denies nausea or pain. Objective:     Vitals:    09/30/19 1220 10/01/19 0415   BP: 132/76 137/80   Pulse: 82 72   Resp: 28 22   Temp: 97.2 °F (36.2 °C) 96.8 °F (36 °C)        Physical Exam:  GENERAL: alert, cooperative, moderate distress, appears stated age,   LUNG: Coarse, diminished breath sounds. Labored respirations at rest and worse with exertion. HEART: regular rate and rhythm  ABDOMEN: soft, non-tender. Bowel sounds normal. No masses,  no organomegaly  : Castillo catheter present on admission.  Dark yellow urine. EXTREMITIES:  extremities with no edema. + pulses. SKIN: Dusky. Warm to touch. 2 open wounds with thick yellow drainage on right medial knee and left posterior thigh. NEUROLOGIC: Alert and oriented. Reflexes and motor strength symmetric. Cranial nerves 2-12 and sensation grossly intact. Generalized weakness. PSYCHIATRIC: confused at times    Assessment:     Hospital Problems  Date Reviewed: 9/30/2019          Codes Class Noted POA    * (Principal) Hypercapnic respiratory failure ([facility] ICD-10-CM: J96.92  ICD-9-CM: 518.81  9/30/2019 Unknown        Cellulitis of right leg ICD-10-CM: L03.115  ICD-9-CM: 682.6  9/25/2019 Yes        Depression ICD-10-CM: F32.9  ICD-9-CM: 551  6/19/2019 Yes        Debility ICD-10-CM: R53.81  ICD-9-CM: 799.3  1/16/2019 Yes    Overview Signed 6/19/2019 11:31 AM by Jodell Cowden, NP     Last Assessment & Plan:   Supportive care, getting home care, monitor. COPD (chronic obstructive pulmonary disease) ([facility] ICD-10-CM: J44.9  ICD-9-CM: 261  11/16/2018 Yes    Overview Signed 6/19/2019 11:31 AM by Jodell Cowden, NP     Last Assessment & Plan:   Severe. Patient currently not taking his Spiriva. Cost is an issue. We will change to Trelegy. Purpose and proper use of inhaler was shown with good return demonstration.   Continue with theophylline although patient says he does not think it helps             Post-polio syndrome ICD-10-CM: G14  ICD-9-CM: 138  11/16/2018 Yes    Overview Signed 6/19/2019 11:31 AM by Jodell Cowden, NP     Last Assessment & Plan:   Supportive care                   Plan:     Current Facility-Administered Medications   Medication Dose Route Frequency    sodium chloride (NS) flush 3 mL  3 mL IntraVENous Q12H    sodium chloride (NS) flush 3 mL  3 mL IntraVENous PRN    acetaminophen (TYLENOL) suppository 650 mg  650 mg Rectal Q3H PRN    bisacodyl (DULCOLAX) suppository 10 mg  10 mg Rectal PRN    glycopyrrolate (ROBINUL) injection 0.2 mg  0.2 mg IntraVENous Q4H PRN    morphine injection 2 mg  2 mg SubCUTAneous Q20MIN PRN    Or    morphine injection 2 mg  2 mg IntraVENous Q20MIN PRN    LORazepam (ATIVAN) injection 1 mg  1 mg IntraVENous Q4H PRN    ciprofloxacin HCl (CIPRO) tablet 500 mg  500 mg Oral Q12H    doxycycline (VIBRAMYCIN) capsule 100 mg (Patient Supplied)  100 mg Oral Q12H    LORazepam (ATIVAN) tablet 1 mg  1 mg Oral Q8H    senna (SENOKOT) tablet 8.6 mg  1 Tab Oral BID    morphine (ROXANOL) concentrated oral syringe 5 mg  5 mg Oral Q30MIN PRN    Or    morphine (ROXANOL) concentrated oral syringe 5 mg  5 mg SubLINGual Q30MIN PRN    albuterol-ipratropium (DUO-NEB) 2.5 MG-0.5 MG/3 ML  3 mL Nebulization Q4H PRN    albuterol-ipratropium (DUO-NEB) 2.5 MG-0.5 MG/3 ML  3 mL Nebulization Q6H RT    pantoprazole (PROTONIX) tablet 40 mg  40 mg Oral ACB    LORazepam (ATIVAN) tablet 1 mg  1 mg Oral Q4H PRN    predniSONE (DELTASONE) tablet 20 mg  20 mg Oral DAILY WITH BREAKFAST    acetaminophen (TYLENOL) tablet 650 mg  650 mg Oral Q4H PRN    fluticasone-salmeterol (ADVAIR/WIXELA) 250mcg-50mcg/puff (Patient Supplied)  1 Puff Inhalation BID    pramipexole (MIRAPEX) tablet 1 mg (Patient Supplied)  1 mg Oral QHS    famotidine (PEPCID) tablet 20 mg  20 mg Oral BID    Saccharomyces boulardii (FLORASTOR) capsule 250 mg (Patient Supplied)  250 mg Oral BID    sertraline (ZOLOFT) tablet 100 mg  100 mg Oral DAILY    traZODone (DESYREL) tablet 150 mg  150 mg Oral QHS     Admitted GIP with hypercapnic respiratory failure for management of pain, dyspnea, agitation, and wound care. 1. Pain: Morphine as ordered. 2. Dyspnea: Morphine as ordered. Nebulizers prn. Continue advair inhaler from home. Oxygen at 7L via oxymizer. Prednisone as ordered. 3. Agitation: Lorazepam 1mg SL Q8 and q4 hours prn.    4. Wound Care: Wound care to left posterior thigh wound and right medial knee wound.      5. Family/Pt Support: No family at bedside during exam. Medications and plan of care discussed with nursing staff. Will continue to monitor for symptoms and adjust medications as needed to maintain patient comfort. PPS 30%. Case discussed with Dr. Jose Guadalupe Yuen. Spoke with his sister via phone. She is picking up a prescription for doxycycline.       Signed By: Dalila Sharma NP     October 1, 2019

## 2019-10-01 NOTE — PROGRESS NOTES
Bedside Report taken from Ayala Heller RN. Pt identified. Pt in bed with eyes closed; displaying no signs or symptoms of pain, dyspnea, agitation,seizures, nausea, or vomiting. FLACC 0. Bed locked and low, side rails up, tabs/bed alarm in place for pt. safety. Call light with in reach, and door opened for continued monitoring. Care plan reviewed with Cna.  0821 po medications given, breathing treatment and inhaler completed, and iv flushed. Pt SOB at rest. Pt converted to oximyzer to eat. pt requested more oatmeal.pain 0/10  0833 removed breathing treatment. Placed pt back on oximyzer so he can eat a second oatmeal.  1003 Pt admitted gip for respiratory failure with symptom management of dyspnea, pain, agitation, and wound care. Pain 0/10    1055 Pt called me to the room he couldn't find his oxygen. Placed pt back on oximyzer. 1107 Pt watching tv, sob at rest but denied. Pt with no needs. pain0/10  1310 Pt eating lunch, visiting with his sister. Pt and sister got into a argument. Pt states that he wants to take a nap and not be woke up. Pt denies pain or sob. Pain 0/10  1500 Pt observed and appears to be sleeping/resting. No symptoms to manage at this time. Flacc 0/10  1646 Po medication given and breathing treatment as pt was sleep earlier and requested not be awaken, as he didn't sleep well last night. Pain 0/10.

## 2019-10-01 NOTE — [facility] IDG CHAPLAIN NOTES
Patient: Yue Porter. Date: 10/01/19  Time: 5:37 PM    [facility]  Notes   has reviewed the Initial Comprehensive assessment and Plan of Care for Mr.Jessie Gamez. / Bereavement Counselor is in agreement with the assessment and plan of care thus far. Desirae Nicole / Bereavement Counselor has completed   Initial Spiritual and Bereavement Assessments and  Will provide care according to needs and desires identified.            Signed by: Niyah He

## 2019-10-01 NOTE — PROGRESS NOTES
LMSW went to visit the patient to complete the SW assessment. LMSW found the patient to be sleeping. LMSW did not disturb. LMSW will reach out to family.

## 2019-10-01 NOTE — PROGRESS NOTES
190:  Report from [place] [facility] [place], RN. Patient ID by name and . Patient in bed visiting with sister. Denies pain. Denies any needs. Bed low and locked and all safety measures in place. Call bell in reach. 2006:  Patient requesting all nighttime medications so he can go to bed. All scheduled medications given as ordered. Doxycycline and Florastor not available to give. Patient denies any other needs at this time. Assessment complete. 2300:  Patient visualized in bed resting with eyes closed. FLACC 0.      0130:  Patient continues to rest with no s/sx of pain, agitation, or dyspnea. FLACC 0.    0350:  Patient resting with no s/sx of pain, agitation, or dyspnea. FLACC 0.    0534:  Patient visualized resting in bed with eyes closed. No s/sx of pain, agitation, or dyspnea. FLACC 0.    4490:  Scheduled Lorazepam PO given. Patient is resting calmly in bed with eyes closed. FLACC 0. No s/sx of pain, agitation, or dyspnea. Patient has required no PRN medications this shift. Report to [facility], RN.

## 2019-10-01 NOTE — [facility] IDG MASTER NOTE
[facility]  Date: 10/01/19  Time: 5:44 PM    ___________________    Patient: Paige Camargo Coverage Information:     Payor: [place]ICARE     Plan: [place] MEDICARE PART A AND B     Subscriber ID: [id number]     Phone Number:   MRN: [id number]  Current Benefit Period: Benefit Period 3  Start Date: 9/30/2019  End Date: 11/28/2019      Medical Director: Dixon Morelos MD   Hospice Attending Provider: No Hospice attending provider. Level of Care: General Inpatient Care      ___________________    Diagnoses: There were no encounter diagnoses.     Current Medications:    Current Facility-Administered Medications:     sodium chloride (NS) flush 3 mL, 3 mL, IntraVENous, Q12H, Vale CARRIE Priest, 3 mL at 10/01/19 0820    sodium chloride (NS) flush 3 mL, 3 mL, IntraVENous, PRN, Vale Priest NP    acetaminophen (TYLENOL) suppository 650 mg, 650 mg, Rectal, Q3H PRN, Vale Priest NP    bisacodyl (DULCOLAX) suppository 10 mg, 10 mg, Rectal, PRN, Vale rPiest NP    glycopyrrolate (ROBINUL) injection 0.2 mg, 0.2 mg, IntraVENous, Q4H PRN, Vale Priest NP    morphine injection 2 mg, 2 mg, SubCUTAneous, Q20MIN PRN **OR** morphine injection 2 mg, 2 mg, IntraVENous, Q20MIN PRN, Vale Priest NP, 2 mg at 09/30/19 1227    LORazepam (ATIVAN) injection 1 mg, 1 mg, IntraVENous, Q4H PRN, Vale Priest NP    ciprofloxacin HCl (CIPRO) tablet 500 mg, 500 mg, Oral, Q12H, Vale Priest NP, 500 mg at 10/01/19 6945    doxycycline (VIBRAMYCIN) capsule 100 mg (Patient Supplied), 100 mg, Oral, Q12H, Vale Priest NP, Stopped at 09/30/19 2100    LORazepam (ATIVAN) tablet 1 mg, 1 mg, Oral, Q8H, Helena Pinedo NP, 1 mg at 10/01/19 1400    senna (SENOKOT) tablet 8.6 mg, 1 Tab, Oral, BID, Vale Priest NP, 8.6 mg at 10/01/19 1921    morphine (ROXANOL) concentrated oral syringe 5 mg, 5 mg, Oral, Q30MIN PRN, 5 mg at 09/30/19 1615 **OR** morphine (ROXANOL) concentrated oral syringe 5 mg, 5 mg, SubLINGual, Q30MIN PRN, Marget Rylan, NP    albuterol-ipratropium (DUO-NEB) 2.5 MG-0.5 MG/3 ML, 3 mL, Nebulization, Q4H PRN, Marget Rylan, NP    albuterol-ipratropium (DUO-NEB) 2.5 MG-0.5 MG/3 ML, 3 mL, Nebulization, Q6H RT, Marget Rylan, NP, 3 mL at 10/01/19 1400    pantoprazole (PROTONIX) tablet 40 mg, 40 mg, Oral, ACB, Marget Rylan, NP, 40 mg at 10/01/19 7664    LORazepam (ATIVAN) tablet 1 mg, 1 mg, Oral, Q4H PRN, Marget Rylan, NP    predniSONE (DELTASONE) tablet 20 mg, 20 mg, Oral, DAILY WITH BREAKFAST, Marget Rylan, NP, 20 mg at 10/01/19 6146    acetaminophen (TYLENOL) tablet 650 mg, 650 mg, Oral, Q4H PRN, Marget Rylan, NP    fluticasone-salmeterol (ADVAIR/WIXELA) 250mcg-50mcg/puff (Patient Supplied), 1 Puff, Inhalation, BID, Marget Rylan, NP, 1 Puff at 10/01/19 9032    pramipexole (MIRAPEX) tablet 1 mg (Patient Supplied), 1 mg, Oral, QHS, Marget Rylan, NP, 1 mg at 09/30/19 2007    famotidine (PEPCID) tablet 20 mg, 20 mg, Oral, BID, Marget Rylan, NP, 20 mg at 10/01/19 6910    Saccharomyces boulardii (FLORASTOR) capsule 250 mg (Patient Supplied), 250 mg, Oral, BID, Marget Rylan, NP, Stopped at 09/30/19 2100    sertraline (ZOLOFT) tablet 100 mg, 100 mg, Oral, DAILY, Marget Rylan, NP, 100 mg at 10/01/19 6917    traZODone (DESYREL) tablet 150 mg, 150 mg, Oral, QHS, Marget Rylan, NP, 150 mg at 09/30/19 2005    Orders:  Orders Placed This Encounter    IP CONSULT TO SPIRITUAL CARE Once on week one, then PRN. For [facility] patients only. For contracted patients, primary hospice will continue to manage spiritual care needs     Once on week one, then PRN. For [facility] patients only. For contracted patients, primary hospice will continue to manage spiritual care needs     Standing Status:   Standing     Number of Occurrences:   1     Order Specific Question:   Reason for Consult:      Answer:   Spiritual crisis intervention or per patient or caregiver request    DIET REGULAR     Standing Status:   Standing     Number of Occurrences:   1    VITAL SIGNS     Standing Status:   Standing     Number of Occurrences:   1    VITAL SIGNS     Standing Status:   Standing     Number of Occurrences:   1    NURSING-MISCELLANEOUS: comfort measures Enter comfort measures above. CONTINUOUS     Enter comfort measures above. Standing Status:   Standing     Number of Occurrences:   1     Order Specific Question:   Description of Order:     Answer:   comfort measures    SIDHU CATHETER, CARE     1. Sidhu Catheter care every shift and PRN  2. Notify Physician of Sidhu Catheter leakage, occlusion, gross adherent sediment or accidental removal  3. Change Sidhu 30 days after insertion. 4. May flush catheter prn leakage or gross adherent sediment or mucus. Standing Status:   Standing     Number of Occurrences:   1    BLADDER CHECKS     May scan bladder PRN for urinary retention and or patient discomfort     Standing Status:   Standing     Number of Occurrences:   1    NURSING ASSESSMENT:  SPECIFY Assess for GIP, routine, or respite level of care. Q SHIFT Routine     Standing Status:   Standing     Number of Occurrences:   1     Order Specific Question:   Please describe the test or procedure you would like to order. Answer:   Assess for GIP, routine, or respite level of care.  PAIN ASSESSMENT Pain and Symptoms: Assess ever 4 hours and PRN, for GIP level of care. PRN Routine     Standing Status:   Standing     Number of Occurrences:   1     Order Specific Question:   Please describe the test or procedure you would like to order. Answer:   Pain and Symptoms: Assess ever 4 hours and PRN, for GIP level of care.  BEDREST, COMPLETE     Standing Status:   Standing     Number of Occurrences:   1    NURSING-MISCELLANEOUS: DME: Please order and place air mattress for pressure reduction.   CONTINUOUS     Please order and place air mattress for pressure reduction. Standing Status:   Standing     Number of Occurrences:   1     Order Specific Question:   Description of Order:     Answer:   DME:    NURSING-MISCELLANEOUS: admit 9/30: (SFE) Admitted GIP with hypercapnic respiratory failure for management of pain, dyspnea and agitation. Hospice Dx:  Hypercapnic respiratory failure. Associated Dx:   Gram-negative bacteremia, E. Coli UTI, soft ti. .. 9/30: (SFE) Admitted GIP with hypercapnic respiratory failure for management of pain, dyspnea and agitation. Hospice Dx:  Hypercapnic respiratory failure. Associated Dx:   Gram-negative bacteremia, E. Coli UTI, soft tissue abscess, presumed MRSA, bullous emphysema, ME, respiratory acidosis, and hypoalbuminemia. Non Associated Dx:  Remote polio, myelitis, and remote alcohol abuse. Standing Status:   Standing     Number of Occurrences:   1     Order Specific Question:   Description of Order:     Answer:   admit    NURSING-MISCELLANEOUS: Ask Family to provide Florastor 250mg capsules to take twice daily. Ask family to bring in mirapex and Advair inhaler from home. CONTINUOUS     Ask family to bring in mirapex and Advair inhaler from home. Standing Status:   Standing     Number of Occurrences:   1     Order Specific Question:   Description of Order:     Answer:   Ask Family to provide Florastor 250mg capsules to take twice daily.  DO NOT RESUSCITATE     Standing Status:   Standing     Number of Occurrences:   1    CONTACT ISOLATION     E. Coli in blood and urine. Presumed MRSA in wound.      Standing Status:   Standing     Number of Occurrences:   1    VENTURI MASK     50% venti-mask with 15L/min via flowmeter     Standing Status:   Standing     Number of Occurrences:   1     Order Specific Question:   Indications for O2 therapy     Answer:   HYPOXIA     Order Specific Question:   FIO2     Answer:   50%    sodium chloride (NS) flush 3 mL    sodium chloride (NS) flush 3 mL    acetaminophen (TYLENOL) suppository 650 mg    bisacodyl (DULCOLAX) suppository 10 mg    glycopyrrolate (ROBINUL) injection 0.2 mg    OR Linked Order Group     morphine injection 2 mg     morphine injection 2 mg    DISCONTD: LORazepam (ATIVAN) injection 1 mg    LORazepam (ATIVAN) injection 1 mg    ciprofloxacin HCl (CIPRO) tablet 500 mg     Order Specific Question:   Antibiotic Indications     Answer:   Bloodstream Infection     Order Specific Question:   Antibiotic Indications     Answer:   Urinary Tract Infection     Order Specific Question:   UTI duration of therapy     Answer: Other     Comments:   8    doxycycline (VIBRAMYCIN) capsule 100 mg (Patient Supplied)     Order Specific Question:   Antibiotic Indications     Answer: Other     Order Specific Question:   Other Abx Indication     Answer:   wound infection    LORazepam (ATIVAN) tablet 1 mg    senna (SENOKOT) tablet 8.6 mg    OR Linked Order Group     morphine (ROXANOL) concentrated oral syringe 5 mg     morphine (ROXANOL) concentrated oral syringe 5 mg    albuterol-ipratropium (DUO-NEB) 2.5 MG-0.5 MG/3 ML     Order Specific Question:   MODE OF DELIVERY     Answer:   Nebulizer    albuterol-ipratropium (DUO-NEB) 2.5 MG-0.5 MG/3 ML     Order Specific Question:   MODE OF DELIVERY     Answer:   Nebulizer    pantoprazole (PROTONIX) tablet 40 mg     Order Specific Question:   PPI INDICATION     Answer:   Symptomatic GERD    LORazepam (ATIVAN) tablet 1 mg    predniSONE (DELTASONE) tablet 20 mg    acetaminophen (TYLENOL) tablet 650 mg     OP SIG:Take 650 mg by mouth every four to six (4-6) hours as needed.  fluticasone-salmeterol (ADVAIR/WIXELA) 250mcg-50mcg/puff (Patient Supplied)     OP SIG:Take 1 Puff by inhalation two (2) times a day.  pramipexole (MIRAPEX) tablet 1 mg (Patient Supplied)     OP SIG:Take 1 mg by mouth nightly.       famotidine (PEPCID) tablet 20 mg     Order Specific Question:   H2RA INDICATION     Answer:   SUP Prophylaxis  Saccharomyces boulardii (FLORASTOR) capsule 250 mg (Patient Supplied)     OP SIG:Take 2 Caps by mouth daily for 7 days.  DISCONTD: senna (SENOKOT) tablet 8.6 mg     OP SIG:Take 1 Tab by mouth daily.  sertraline (ZOLOFT) tablet 100 mg     OP SIG:Take 100 mg by mouth daily.  traZODone (DESYREL) tablet 150 mg     OP SIG:Take 150 mg by mouth nightly. [facility] sends a 150mg tablet      INITIAL PHYSICIAN ORDER: HOSPICE Level Of Care: General Inpatient; Reason for Admission: 9/30: (SFE) Admitted GIP with hypercapnic respiratory failure for management of pain, dyspnea and agitation. Standing Status:   Standing     Number of Occurrences:   1     Order Specific Question:   Status     Answer:   Hospice     Order Specific Question:   Level Of Care     Answer:   General Inpatient     Order Specific Question:   Reason for Admission     Answer:   9/30: (SFE) Admitted GIP with hypercapnic respiratory failure for management of pain, dyspnea and agitation. Order Specific Question:   Inpatient Hospitalization Certified Necessary for the Following Reasons     Answer:   3. Patient receiving treatment that can only be provided in an inpatient setting (further clarification in H&P documentation)     Order Specific Question:   Admitting Diagnosis     Answer:   Hypercapnic respiratory failure [facility]) [[id number]]     Order Specific Question:   Terminal Prognosis Diagnosis(es)     Answer:   Hypercapnic respiratory failure [facility]) [[id number]]     Order Specific Question:   Admitting Physician     Answer:   Rod Velasquez     Order Specific Question:   Attending Physician     Answer:   Rod Velasquez     Order Specific Question:   Discharge Plan:     Answer: Other (Specify)    IP CONSULT TO SOCIAL WORK     Once on week one, then PRN for Psychosocial crisis intervention or per patient or caregiver request.  For [facility] patients only.  For contracted patients, primary hospice will continue to manage social work needs. Standing Status:   Standing     Number of Occurrences:   1     Order Specific Question:   Reason for Consult: Answer: Once on week one, then PRN for Psychosocial crisis intervention or per patient or caregiver request.       Allergies:  No Known Allergies    Care Plan:  Multidisciplinary Problems (Active)     Problem: Anticipatory Grief     Dates: Start: 10/01/19       Description:     Disciplines: Interdisciplinary    Goal: Explore reactions to and verbalize acceptance of impending loss     Dates: Start: 10/01/19   Expected End: 10/15/19       Description: Patient/family/caregiver will explore reactions to and verbalize acceptance of impending loss. Disciplines: Interdisciplinary    Intervention: Assess grief responses     Dates: Start: 10/01/19       Description:           Intervention: Assist with grief counseling     Dates: Start: 10/01/19       Description:  to assist.          Intervention: Calixto Perez on stages of grief     Dates: Start: 10/01/19       Description: Instruct patient/caregiver on stages of grief. Intervention: Offer grief support group     Dates: Start: 10/01/19       Description: Patient/family/caregiver will explore reactions to and verbalize acceptance of impending loss. Problem: Anxiety/Agitation     Dates: Start: 10/01/19       Description:     Disciplines: Interdisciplinary    Goal: Verbalize and demonstrate ability to manage anxiety     Dates: Start: 10/01/19   Expected End: 10/15/19       Description: The patient/family/caregiver will verbalize and demonstrate ability to manage the patient's anxiety throughout hospice care. Disciplines: Interdisciplinary    Intervention: Assess for anxiety/agitation     Dates: Start: 10/01/19       Description: Assess for signs and symptoms of anxiety and agitation.           Intervention: Instruction strategies to reduce anxiety/agitation     Dates: Start: 10/01/19 Description: Instruct patient/caregiver on strategies to reduce anxiety/agitation. Problem: Coping and Emotional Distress     Dates: Start: 10/01/19       Description:     Disciplines: Interdisciplinary    Goal: Demonstrate acceptance of terminal illness and understanding of disease progression     Dates: Start: 10/01/19   Expected End: 10/15/19       Description: Patient/family/caregiver will demonstrate acceptance of terminal disease and understanding of disease progression while employing appropriate coping mechanisms. Disciplines: Interdisciplinary    Intervention: Assess for alteration in coping     Dates: Start: 10/01/19       Description:           Intervention: Assess for signs/symptoms of emotional distress     Dates: Start: 10/01/19       Description:           Intervention: Instruct on effective coping skills     Dates: Start: 10/01/19       Description: Instruct patient/caregiver on effective coping skills. Intervention: Instruct on strategies to reduce emotional distress     Dates: Start: 10/01/19       Description: Instruct patient/caregiver on strategies to reduce emotional distress. Problem: End of Life Process     Dates: Start: 10/01/19       Description:     Disciplines: Interdisciplinary    Goal: Demonstrate understanding of end of life processes     Dates: Start: 10/01/19   Expected End: 10/15/19       Description: Gopi Edmond will understand end of life processes.     Disciplines: Interdisciplinary    Intervention: Assess for signs/symptoms of terminal restlessness     Dates: Start: 10/01/19       Description:           Intervention: Instruct on strategies to reduce terminal restlessness     Dates: Start: 10/01/19       Description:           Intervention: Instruct on the dying process     Dates: Start: 10/01/19       Description:           Intervention: Instruct: imminent death     Dates: Start: 10/01/19       Description: Intervention: Instruct: process at end of life     Dates: Start: 10/01/19       Description:                       Problem: Falls - Risk of     Dates: Start: 09/30/19       Description:     Disciplines: Interdisciplinary    Goal: *Absence of Falls     Dates: Start: 09/30/19   Expected End: 10/08/19       Description: Pt will remain free from falls aeb no injuries while at [facility]. Disciplines: Interdisciplinary                Problem: Pain     Dates: Start: 10/01/19       Description:     Disciplines: Nurse, Interdisciplinary, RT    Goal: *Control of Pain     Dates: Start: 10/01/19   Expected End: 10/08/19       Description: Crow Willoughby will have pain control AEB a numeric score of 2 or less on a scale of 1-10.     Disciplines: Nurse, Interdisciplinary, RT    Intervention: Assess pain characteristics (eg: Intensity scale; onset; location; quality; severity; duration; frequency; radiation)     Dates: Start: 10/01/19       Description:           Intervention: Assess pain management - barriers (eg: Past pain experiences)     Dates: Start: 10/01/19       Description:           Intervention: Identify pain expectations (eg: Patient's pain goal; somatic experiences; behavioral changes; affect)     Dates: Start: 10/01/19       Description:           Intervention: Identify pain medication concerns (eg: Cultural considerations; addiction concerns)     Dates: Start: 10/01/19       Description:           Intervention: Support system identification (eg: Caregiver; community resource; family; friends; [employer]; support group)     Dates: Start: 10/01/19       Description:           Intervention: Monitor for change in patient condition (eg:  Vital signs changes; changes in level of consciousness; nausea; behavioral changes)     Dates: Start: 10/01/19       Description:           Intervention: Medication side-effect assessment     Dates: Start: 10/01/19       Description:           Intervention: Pain-relief response reassessment (eg: Frequency based on route of administration; effectiveness)     Dates: Start: 10/01/19       Description:                       Problem: Pressure Injury - Risk of     Dates: Start: 09/30/19       Description:     Disciplines: Interdisciplinary    Goal: *Prevention of pressure injury     Dates: Start: 09/30/19   Expected End: 10/15/19       Description: Pt will remain free from/ any further pressure injuries aeb no/progression pressure sores while at [address] [place] [address]. Disciplines: Interdisciplinary                Problem: Risk for Spread of Infection     Dates: Start: 09/30/19       Description:     Disciplines: Interdisciplinary    Goal: Prevent transmission of infectious organism to others     Dates: Start: 09/30/19   Expected End: 10/15/19       Description: Prevent the transmission of infectious organisms to other patients, staff members, and visitors. Disciplines: Interdisciplinary    Intervention: Verify correct isolation order has been placed for confirmed or suspected infection     Dates: Start: 09/30/19       Description:           Intervention: Proper isolation precautions placed on patient door     Dates: Start: 09/30/19       Description:           Intervention: Proper PPE utilized by all who enter patient's room     Dates: Start: 09/30/19       Description:           Intervention: Proper hand hygiene     Dates: Start: 09/30/19       Description: Use alcohol based hand  or soap and water.           Intervention: Proper decontamination of surfaces and shared equipment/devices     Dates: Start: 09/30/19       Description:                       Problem: Spiritual Distress     Dates: Start: 10/01/19       Description:     Disciplines: Interdisciplinary    Goal: Distress heard, acknowledged, and addressed     Dates: Start: 10/01/19   Expected End: 10/15/19       Description: Patient/family/caregiver distress will be heard, acknowledged, and addressed throughout hospice care.    Disciplines: Interdisciplinary    Intervention: Assess for signs/symptoms of spiritual distress     Dates: Start: 10/01/19       Description:           Intervention: Consult on spiritual care     Dates: Start: 10/01/19       Description: Consult to Spiritual Care          Intervention: Discuss strategies to reduce spiritual distress     Dates: Start: 10/01/19       Description: Discuss with patient/caregiver strategies to reduce spiritual distress.                         Care Plan Problems/Goals      Progressing Towards Goal (9)     Prevent transmission of infectious organism to others     Problem: Risk for Spread of Infection    Disciplines: Interdisciplinary    Expected end:  10/15/19     Progressing Towards Goal By Zoë Villatoro RN on 10/01/19 1002               *Absence of Falls     Problem: Falls - Risk of    Disciplines: Interdisciplinary    Expected end:  10/08/19     Progressing Towards Goal By Zoë Villatoro RN on 10/01/19 1002               *Prevention of pressure injury     Problem: Pressure Injury - Risk of    Disciplines: Interdisciplinary    Expected end:  10/15/19     Progressing Towards Goal By Zoë Villatoro RN on 10/01/19 1002               Explore reactions to and verbalize acceptance of impending loss     Problem: Anticipatory Grief    Disciplines: Interdisciplinary    Expected end:  10/15/19     Progressing Towards Goal By Arash Avitia RN on 10/01/19 2334               Verbalize and demonstrate ability to manage anxiety     Problem: Anxiety/Agitation    Disciplines: Interdisciplinary    Expected end:  10/15/19     Progressing Towards Goal By Zoë Villatoro RN on 10/01/19 1002               Demonstrate acceptance of terminal illness and understanding of disease progression     Problem: Coping and Emotional Distress    Disciplines: Interdisciplinary    Expected end:  10/15/19     Progressing Towards Goal By Arash Avitia RN on 10/01/19 3911               Distress heard, acknowledged, and addressed     Problem: Spiritual Distress    Disciplines: Interdisciplinary    Expected end:  10/15/19     Progressing Towards Goal By Ugo Johnston RN on 10/01/19 4762               Demonstrate understanding of end of life processes     Problem: End of Life Process    Disciplines: Interdisciplinary    Expected end:  10/15/19     Progressing Towards Goal By Ugo Johnston RN on 10/01/19 5514               *Control of Pain     Problem: Pain    Disciplines: Nurse, Interdisciplinary, RT    Expected end:  10/08/19     Progressing Towards Goal By Kati William RN on 10/01/19 1002                           ___________________    Care Team Notes          POC/IDG Notes      [facility] IDG  Notes by Tomas Singleton at 10/01/19 1737  Version 1 of 1    Author:  Tomas Singleton Service:  Spiritual Care Author Type:  Pastoral Care    Filed:  10/01/19 1739 Date of Service:  10/01/19 1737 Status:  Signed    :  Tomas Singleton (Pastoral Care)       Patient: Vlad Thomas. Date: 10/01/19  Time: 5:37 PM    [facility]  Notes   has reviewed the Initial Comprehensive assessment and Plan of Care for Mr.Jessie Gamez. / Bereavement Counselor is in agreement with the assessment and plan of care thus far. Gabriela Lacks / Bereavement Counselor has completed   Initial Spiritual and Bereavement Assessments and  Will provide care according to needs and desires identified. Signed by: Henri MOARLES [facility] IDG  Notes by Henna Rivera at 10/01/19 0908  Version 1 of 1    Author:  Henna Rivera Service:  Licensed Clinical  Author Type:      Filed:  10/01/19 0909 Date of Service:  10/01/19 0908 Status:  Signed    :  Henna Rivera ()       Patient: Vlad Thomas. Date: 10/01/19  Time: 9:08 AM    [facility]  Notes  LMSW has reviewed the initial Rn Comprehensive assessment and plan of care. Acknowledge there is no immediate needs for SW. Signed by: Isidoro WOLFE Nurse Notes by Sonjia Harada, RN at 19 1141  Version 1 of 1    Author:  Sonjia Harada, RN Service:  NURSING Author Type:  Registered Nurse    Filed:  19 7659 Date of Service:  19 114 Status:  Signed    :  Sonjia Harada, RN (Registered Nurse)       [phone]: Pt arrived via EMS. Sister, Adriana Walls following behind. 68year old male patient admitted to room 115 with hospice diagnosis of hypercapneic respiratory failure. Level of care is general inpatient for management of pain, dyspnea, agitation, wound care. Description: Pt is on 15 L venturi mask continuous and per discharging RN, Pt de-sats when off the O2 but quickly recovers once placed back on O2. Pt removes mask at times out of confusion and becomes more confused. Needs reorientation and reassurance. Recent HPI: Pt is positive for ecoli in blood and urine. Castillo catheter containing UOP and wounds to BLE most likely with MRSA infxn. Current symptom: pain and dyspnea and anxiety    Functional status:Pt becomes extremely anxious and SOB with any activity so activity is limited to BR. Castillo cathter in place. PO intake:Regular diet but unable to tolerate much eating d/t amount of O2 required continuously. Code status:DNR    Other PMH:Neurogenic bladder, polio, myelitis, and history of ETOH and benzo use    Family dynamics:Currently been residing with sister, dAriana Walls but the relationship is strained at times. Both have their own issues to deal with. Sister lost spouse. Pt has been  twice from same lady     plans: Unknown at this time. Wound care: 1600: Wound care completed with Janice Vora NP and Darylene Munch, Med student at bedside assisting. Please see media pictures. Wound to inner right knee 3 cm x 2.5 cm open with slough present and pustulant, serosanguinous drainage noted.  Covered with telfa and wrapped in gauze with paper tape to secure. Wound to left upper posterior leg 1 cm x 1 cm with pustulant drainage noted. Covered with mepilex. Pt tolerated well and stated pain 4/10 post procedure. Pt requested medication for anxiety and this was relayed to [facility], Primary RN. Sister left shortly after arrival stating she was \"completely wiped out and must go home for a nap\". RN requested home meds and Sister stated she did not bring them and would have to bring them after her nap. Admission complete and initial general hospice care plan initiated which includes spiritual, psychosocial, and bereavement. Immediate needs recognized for wound care, family care including social work and possible difficulty with bereavement on sister's part when EOL nears. IDG team made aware of plan of care and immediate needs.                 Care Team Present:   Team Members Present: Physician, , Bereavement, Nurse, Vol Coordinator,   Physician Team Member: Severiano Smoke MD  Nurse Team Member: Jeyson Rojas RN  Social Work Team Member: Jeane Moritz [facility]  [address] Team Member: Chantell Sutherland   Vol Coordinator: Sallie Lentz

## 2019-10-02 NOTE — HOSPICE
0930: Report received from Russ pandey [facility]. Assuming care of Pt at this time. 1100: Pt has been comfortable though at times removes O2 and becomes confused intermittently. Sister, Maribell Archer is now visiting with Pt.     1200: Community  visit noted at bedside. 1845: Pt sister, Maribell Archer has visited again. Pt became anxious with this visit but settled afterwards. Unsure of conversation that was spoken. Pt continues to move NC at times and continues to become confused. Pt loves chocolate.    Report was given to [facility]

## 2019-10-02 NOTE — PROGRESS NOTES
Pt laying in hospice bed eating lunch with tray table over his legs. Pt has oxygen tube in his nose having to adjust it every now and then due to it coming out. Pt appeared to be comfortable with no pain level expressed or observed. Pt was alert and verbal and shared the recent events that have lead him to being in the [facility] at this point. Pt spoke of his desire to be in his grandson's life. Pt is making arrangements to be more involved. Stated that he will be living with his sister part-time (3 days a week) and in [place], [place] some of the week and possibly at [facility] some. Shared that he is now on some medication for his anxiety which has helped him. Shared that he enjoyed  Katrina's visit.  provided spiritual care through pastoral conversation, active listening, supportive responses, and prayer. Next visit to be determined due to living situation.

## 2019-10-02 NOTE — PROGRESS NOTES
Circumstances for Admission: Pt was admitted from [facility] on 19. He was transported to [facility] via ambulance to room 115. Pt is awake and alert in his room when I visited. He reported that he had not had breakfast this am.  There were remnants of his breakfast on his bed side table. Pt visibly short of breath even with not speaking. Patient-Family/Social History: Pt is a 68year old  man. He cared for his spouse. His spouse Corine Stearns is now being cared for by Westley Atkinsontte- daughter. Pt did not serve in the [employer]. He worked for the [employer]. He states he has had a long history of drinking. \"I drank everything in the house I could. \"  He did not talk about the losses in his life (child killed by drunk )  Pt was struggling to finish sentences, he would have very long pauses and forget where he was in his story. He said he would have his sister get with me to fill in the blanks. Payor Source:  Medicare and Secondary BC       Financial / History: He has no financial concerns. Sister signed the inpatient financial agreement. Spritiual Needs:  Did not discuss wanted me to speak to his sister. Spoke to his sister. She said Don't talk to him about Buddhist support/  \" His  is going to be a sad one\"      Home Environment: Before he went to the hospital he lived on his own. His sister was caring for him. Patient's Emotional and Cognitive Status: Forgetful, having a hard time with his words. Primary Caregiver and Resources used/needed at home Dayday Cancino was his primary caregiver. Advance Directive/HCPOA / DNR Status:  Dayday Cancino is his HCPOA and Westley Wright is secondary. Copy on file.   Pt is a DNR       Final Arrangements: He asked that I speak to Dayday Cancino for details, Spoke to Dayday Cancino and they are using [facility]       Bereavement Risk: Low, normal grief reaction expected       Discharge Planning: Pt states that if he has to be discharged he would be going back to his sisters home.       Volunteer discussion:  Y     Referrals Made: [facility]

## 2019-10-02 NOTE — PROGRESS NOTES
Follow up with Sister:    Hakeem Almonte met with patient's sister in the family room. Lucretia Kat is known to . Lucretia Kat was once a volunteer for [employer]. Background: Lucretia Kat has been caring and supporting her brother physically and emotionally recently. Mr. Shavonne Land said to the  yesterday he knows if it had not been for her, he would not be living. Lucretia Kat is a . She says it is much easier for her to care for her brother than his daughter who lives in [place]. Lucretia Kat said the daughter Alvin Cabrales works and has a child which keeps her busy. Up unto this point she has provided care but has not assumed responsibility of his finances. She is his Durable POA has continued to direct his finances. Lucretia Kat says she is one who doesn't cry easily but she has found herself tearful recently. She says as a marine's daughter she was instructed to be tough.  encouraged her to give herself a break and allow herself to feel the grief rather than find herself sick from bottling up her feelings. Lucretia Kat says she will be able to care for her brother as long as he is like he is now, calm and peaceful. She wants him to experience peace the rest of his life and in death.  assured Lucretia Kat the team would provide excellent care for her brother and would be available for her as well. This visit  provided a safe place for open communication, provided active reflective listening, words of comfort and encouragement, affirmation of reina. Grief: This is the first of the siblings to have  a terminal illness. Family dynamics were difficult for them all. A time like this brings up memories that may need to be dealt with.  will provide opportunity for patient and family to share feelings of grief. Plan: See care plan.

## 2019-10-02 NOTE — PROGRESS NOTES
2200:  Report from [facility], RN. Patient ID by name and . Patient visualized in bed with eyes closed. No s/sx of pain, dyspnea, or agitation. FLACC 0. Bed low and locked and all safety measures in place. 3111:  Patient in bed resting with no s/sx of pain, agitation, or dyspnea. FLACC 0.    0112:  Patient attempting to exit the bed. Oxygen noted to be off and patient confused, restless, and dyspneic. States he has to get up; unable to follow directions. PRN Morphine IV given for dyspnea and PRN Lorazepam IV given for agitation. 0200 Scheduled nebulizer given at this time. Safety measures remain in place. 0259:  Patient rested well for an hour and a half. Woke up agitated and dyspneic again. Oxygen noted to be off. Not time for agitation medication at this time. PRN Morphine IV given for dyspnea. 0330:  Reassessment. Patient now resting calmly with unlabored breathing and no agitation noted. :  Patient noted to be resting peacefully with no s/sx of pain, agitation, or dyspnea. FLACC 0     0619:  Scheduled Lorazepam PO given. Patient resting in bed with eyes closed. FLACC 0. No s/sx of pain, agitation, or dyspnea. Patient has removed oxygen many times during the night resulting in more confusion and agitation. Patient required two doses of PRN Morphine IV for dyspnea and one dose of PRN Lorazepam IV for agitation this shift. All safety measures in place. Report to Lul Nicole RN.

## 2019-10-02 NOTE — PROGRESS NOTES
6748 Report received from Chika Willis. Patient identified by name and . Patient resting quietly in bed with eyes closed. No signs or symptoms of distress noted at present. Bed in low and locked position, side rails up x2, call bell within reach, tab alarm in place. No family present at bedside. Door open for continuous monitoring. 0830 Patient took AM meds whole without difficulty. He ate about 50% of his breakfast.  AM assessment complete. Patient has partially pulled his peripheral IV site out and it has no end cap on it. RN found end cap on the floor. Patient has taken things apart including his venturi mask since he has been here.   RN instructed him not to touch his IV site if he gets a new one and never to remove the cap as this keeps germs from entering his bloodstream.  IV site removed from right wrist.

## 2019-10-02 NOTE — PROGRESS NOTES
Problem: Risk for Spread of Infection  Goal: Prevent transmission of infectious organism to others  Description  Prevent the transmission of infectious organisms to other patients, staff members, and visitors. Outcome: Progressing Towards Goal     Problem: Falls - Risk of  Goal: *Absence of Falls  Description  Pt will remain free from falls aeb no injuries while at [facility]. Outcome: Progressing Towards Goal  Note:   Fall Risk Interventions:  Mobility Interventions: Bed/chair exit alarm    Mentation Interventions: Bed/chair exit alarm, Adequate sleep, hydration, pain control    Medication Interventions: Bed/chair exit alarm, Patient to call before getting OOB    Elimination Interventions: Bed/chair exit alarm, Call light in reach    History of Falls Interventions: Bed/chair exit alarm, Door open when patient unattended, Room close to nurse's station         Problem: Pressure Injury - Risk of  Goal: *Prevention of pressure injury  Description  Pt will remain free from/ any further pressure injuries aeb no/progression pressure sores while at [facility].      Outcome: Progressing Towards Goal  Note:   Pressure Injury Interventions:  Sensory Interventions: Assess changes in LOC, Keep linens dry and wrinkle-free, Minimize linen layers    Moisture Interventions: Absorbent underpads, Apply protective barrier, creams and emollients, Internal/External urinary devices, Minimize layers    Activity Interventions: Assess need for specialty bed, Increase time out of bed, Pressure redistribution bed/mattress(bed type)    Mobility Interventions: Pressure redistribution bed/mattress (bed type), Float heels    Nutrition Interventions: Document food/fluid/supplement intake, Offer support with meals,snacks and hydration    Friction and Shear Interventions: Apply protective barrier, creams and emollients, Minimize layers                Problem: Anticipatory Grief  Goal: Explore reactions to and verbalize acceptance of impending loss  Description  Patient/family/caregiver will explore reactions to and verbalize acceptance of impending loss. Outcome: Progressing Towards Goal     Problem: Anxiety/Agitation  Goal: Verbalize and demonstrate ability to manage anxiety  Description  The patient/family/caregiver will verbalize and demonstrate ability to manage the patient's anxiety throughout hospice care. Outcome: Progressing Towards Goal     Problem: Coping and Emotional Distress  Goal: Demonstrate acceptance of terminal illness and understanding of disease progression  Description  Patient/family/caregiver will demonstrate acceptance of terminal disease and understanding of disease progression while employing appropriate coping mechanisms. Outcome: Progressing Towards Goal     Problem: Spiritual Distress  Goal: Distress heard, acknowledged, and addressed  Description  Patient/family/caregiver distress will be heard, acknowledged, and addressed throughout hospice care. Outcome: Progressing Towards Goal     Problem: End of Life Process  Goal: Demonstrate understanding of end of life processes  Description  Patient/caregiver will understand end of life processes. Outcome: Progressing Towards Goal     Problem: Pain  Goal: *Control of Pain  Description  Crow Willoughby will have pain control AEB a numeric score of 2 or less on a scale of 1-10.   Outcome: Progressing Towards Goal

## 2019-10-03 NOTE — PROGRESS NOTES
Problem: Risk for Spread of Infection  Goal: Prevent transmission of infectious organism to others  Description  Prevent the transmission of infectious organisms to other patients, staff members, and visitors. Outcome: Progressing Towards Goal     Problem: Falls - Risk of  Goal: *Absence of Falls  Description  Pt will remain free from falls aeb no injuries while at [facility]. Outcome: Progressing Towards Goal  Note:   Fall Risk Interventions:  Mobility Interventions: Bed/chair exit alarm    Mentation Interventions: Adequate sleep, hydration, pain control, Bed/chair exit alarm, Door open when patient unattended, Room close to nurse's station, Toileting rounds, More frequent rounding, Evaluate medications/consider consulting pharmacy, Reorient patient    Medication Interventions: Bed/chair exit alarm, Evaluate medications/consider consulting pharmacy    Elimination Interventions: Bed/chair exit alarm, Call light in reach, Toileting schedule/hourly rounds, Patient to call for help with toileting needs    History of Falls Interventions: Bed/chair exit alarm, Door open when patient unattended, Room close to nurse's station         Problem: Pressure Injury - Risk of  Goal: *Prevention of pressure injury  Description  Pt will remain free from/ any further pressure injuries aeb no/progression pressure sores while at [facility].      Outcome: Progressing Towards Goal  Note:   Pressure Injury Interventions:  Sensory Interventions: Assess changes in LOC, Check visual cues for pain, Float heels, Assess need for specialty bed    Moisture Interventions: Absorbent underpads, Maintain skin hydration (lotion/cream), Moisture barrier, Apply protective barrier, creams and emollients, Check for incontinence Q2 hours and as needed, Limit adult briefs    Activity Interventions: Assess need for specialty bed    Mobility Interventions: Assess need for specialty bed, Float heels, HOB 30 degrees or less    Nutrition Interventions: Document food/fluid/supplement intake, Offer support with meals,snacks and hydration    Friction and Shear Interventions: Apply protective barrier, creams and emollients, Lift sheet, Foam dressings/transparent film/skin sealants, Lift team/patient mobility team, Minimize layers                Problem: Anticipatory Grief  Goal: Explore reactions to and verbalize acceptance of impending loss  Description  Patient/family/caregiver will explore reactions to and verbalize acceptance of impending loss. Outcome: Progressing Towards Goal     Problem: Anxiety/Agitation  Goal: Verbalize and demonstrate ability to manage anxiety  Description  The patient/family/caregiver will verbalize and demonstrate ability to manage the patient's anxiety throughout hospice care. Outcome: Progressing Towards Goal     Problem: Coping and Emotional Distress  Goal: Demonstrate acceptance of terminal illness and understanding of disease progression  Description  Patient/family/caregiver will demonstrate acceptance of terminal disease and understanding of disease progression while employing appropriate coping mechanisms.   Outcome: Progressing Towards Goal

## 2019-10-03 NOTE — PROGRESS NOTES
1900 - Bedside Report taken from [facility]. Pt identified. Pt awake, in bed, watching TV. Pt had no signs or symptoms of  dyspnea, agitation,seizures, nausea, or vomiting. Reported some pain to right knee. Bed locked and low, side rails up, tabs/bed alarm in place for pt. safety. Call light with in reach, and door opened for continued monitoring. 1955 - PRN roxanol administered @ 1942 for knee pain of 5/10. Scheduled duo-neb treatment given. Assessment complete. Pt resting comfortably in bed with no other S&S of distress or needs voiced at this time. 2015 - Pt asked for a sandwich. Pt informed that we have no sandwiches, but have soup, applesauce or pudding which he refused. When asked about ice cream he said yes and ate both a vanilla and a strawberry. 2146 - Scheduled meds administered. Pt pulled up in bed,  Repositioned, and linens straightened. O2 reapplied. Pt stated no pain or further needs at this time. 2300 - Pt resting quietly--eyes closed, resp present. O2 oximizer remains in place. No S&S of distress at this time. 0227 - Pt resting quietly--eyes closed, resp present. O2 oximizer remains in place. No S&S of distress at this time. 0949 - Pt woke up a bit restless. When asked if he wanted his breathing treatment, he said no, he'd rather sleep. He c/o constant pain to his right knee of 5/10. PRN roxanol administered. Pt assisted with repositioning to his right side with pillow supports. VS taken. Oxymizer remains in place. No other needs voiced at this time. 8421 - Pt resting quietly--eyes closed, resp present with no S&S of distress at this time. 0700 - Scheduled meds administered at 2921. Pt premedicated with roxanol for wound care to right knee. Knee cleaned with normal saline. Hydrogel applied and wound covered with tefla adhesive pad. Ice water provided. Castillo care provided and collection bag emptied of 1175 clear yellow urine. Pt repositioned for comfort, and oxymizer reapplied. No other needs voiced at this time. Report given to Fadi Early [place].

## 2019-10-03 NOTE — PROGRESS NOTES
The patient report and walking rounds completed with the off going RN at 8361. The patient was identified by name and . He is awake and alert to person and place. He states that he is in no pain, has no SOB, no anxiety and no nausea. The bed is low and locked and the call light is within his reach. He calls with his needs. 7198- The scheduled AM medications were administered as ordered. Patient took all medications whole with water without any problems. He continues to deny any pain or other distress at this time. His neb treatment was given and he rinsed out his mouth. He is alert to person and place. 1010- The patient is very restless and anxious. He is complaining of pain in his right knee of 6/10. Medicated po with Lorazepam for anxiety and po with Morphine for pain. The patient was repositioned for comfort. 1040- The patient states no pain now and less anxiety. The patient is alert and oriented to person and place. 1332-Scheduled medications administered. Patient states no pain now. 1550- The patient continues to rest quietly. He continues to state no pain or anxiety at this time. He is alert to person and place. 1745- The patient is resting quietly and denies any distress at this time. Will report off to the on coming RN and complete walking rounds.

## 2019-10-03 NOTE — PROGRESS NOTES
Problem: Risk for Spread of Infection  Goal: Prevent transmission of infectious organism to others  Description  Prevent the transmission of infectious organisms to other patients, staff members, and visitors. Outcome: Progressing Towards Goal     Problem: Falls - Risk of  Goal: *Absence of Falls  Description  Pt will remain free from falls aeb no injuries while at [facility]. Outcome: Progressing Towards Goal  Note:   Fall Risk Interventions:  Mobility Interventions: Bed/chair exit alarm    Mentation Interventions: Adequate sleep, hydration, pain control, Bed/chair exit alarm, Door open when patient unattended, Room close to nurse's station, Toileting rounds, More frequent rounding, Evaluate medications/consider consulting pharmacy, Reorient patient    Medication Interventions: Bed/chair exit alarm, Evaluate medications/consider consulting pharmacy    Elimination Interventions: Bed/chair exit alarm, Call light in reach, Toileting schedule/hourly rounds, Patient to call for help with toileting needs    History of Falls Interventions: Bed/chair exit alarm, Door open when patient unattended, Room close to nurse's station         Problem: Pressure Injury - Risk of  Goal: *Prevention of pressure injury  Description  Pt will remain free from/ any further pressure injuries aeb no/progression pressure sores while at [facility].      Outcome: Progressing Towards Goal  Note:   Pressure Injury Interventions:  Sensory Interventions: Assess changes in LOC, Check visual cues for pain, Float heels, Assess need for specialty bed    Moisture Interventions: Absorbent underpads, Maintain skin hydration (lotion/cream), Moisture barrier, Apply protective barrier, creams and emollients, Check for incontinence Q2 hours and as needed, Limit adult briefs    Activity Interventions: Assess need for specialty bed    Mobility Interventions: Assess need for specialty bed, Float heels, HOB 30 degrees or less    Nutrition Interventions: Document food/fluid/supplement intake, Offer support with meals,snacks and hydration    Friction and Shear Interventions: Apply protective barrier, creams and emollients, Lift sheet, Foam dressings/transparent film/skin sealants, Lift team/patient mobility team, Minimize layers                Problem: Anticipatory Grief  Goal: Explore reactions to and verbalize acceptance of impending loss  Description  Patient/family/caregiver will explore reactions to and verbalize acceptance of impending loss. Outcome: Progressing Towards Goal     Problem: Anxiety/Agitation  Goal: Verbalize and demonstrate ability to manage anxiety  Description  The patient/family/caregiver will verbalize and demonstrate ability to manage the patient's anxiety throughout hospice care. Outcome: Progressing Towards Goal     Problem: Coping and Emotional Distress  Goal: Demonstrate acceptance of terminal illness and understanding of disease progression  Description  Patient/family/caregiver will demonstrate acceptance of terminal disease and understanding of disease progression while employing appropriate coping mechanisms. Outcome: Progressing Towards Goal     Problem: Spiritual Distress  Goal: Distress heard, acknowledged, and addressed  Description  Patient/family/caregiver distress will be heard, acknowledged, and addressed throughout hospice care. Outcome: Progressing Towards Goal     Problem: End of Life Process  Goal: Demonstrate understanding of end of life processes  Description  Patient/caregiver will understand end of life processes. Outcome: Progressing Towards Goal     Problem: Pain  Goal: *Control of Pain  Description  Lupis Greco will have pain control AEB a numeric score of 2 or less on a scale of 1-10.   Outcome: Progressing Towards Goal

## 2019-10-03 NOTE — PROGRESS NOTES
I called the sister of Mr. Bailee Max. She would like him discharged on Monday morning. She is aware of the $256.00 a day fees.   Transport is set at 9:30am    [facility] [place] notifications to in home and providers of D/ C Plan

## 2019-10-03 NOTE — PROGRESS NOTES
190:  Report from [facility]. Patient ID by name and . Patient in bed with eyes closed. No s/sx of pain, dyspnea, or agitation. FLACC 0. STAT lock applied to left arm to place TAB alert as patient will not leave gown on.     :  Scheduled medications given at this time. Bath in progress. :  PRN Roxanol given per patient request for dyspnea. :  Patient states his breathing is more relaxed now. 5637:  Patient complaining of having trouble breathing and is anxious. Medicated with PRN Roxanol for dyspnea and PRN Lorazepam PO for anxiety. Will reassess. 0140:  Patient resting comfortably with no s/sx of dyspnea or anxiety. 0400:  Patient resting with no s/sx of pain, agitation, or dyspnea. FLACC 0.    0522:  Patient continues to rest with no s/sx of pain, agitation, or dyspnea. FLACC 0.    0554:  Scheduled Lorazepam PO given. Patient has required PRN medication for dyspnea and anxiety this shift. Currently no symptoms to be managed. Report to Gene Negro RN.

## 2019-10-03 NOTE — ADVANCED PRACTICE NURSE
Alert and oriented. Eating well. Taking all meds orally. Has not required med changes in 48 hours. Will change to routine level of care. Case discussed with Dr. Roibn Gutiérrez.

## 2019-10-04 NOTE — PROGRESS NOTES
Problem: Risk for Spread of Infection  Goal: Prevent transmission of infectious organism to others  Description  Prevent the transmission of infectious organisms to other patients, staff members, and visitors. Outcome: Progressing Towards Goal     Problem: Falls - Risk of  Goal: *Absence of Falls  Description  Pt will remain free from falls aeb no injuries while at [facility]. Outcome: Progressing Towards Goal  Note:   Fall Risk Interventions:  Mobility Interventions: Bed/chair exit alarm, Communicate number of staff needed for ambulation/transfer    Mentation Interventions: Adequate sleep, hydration, pain control, Bed/chair exit alarm, Door open when patient unattended, Evaluate medications/consider consulting pharmacy, Eyeglasses and hearing aids, Reorient patient, Room close to nurse's station, Update white board    Medication Interventions: Bed/chair exit alarm, Evaluate medications/consider consulting pharmacy    Elimination Interventions: Bed/chair exit alarm, Call light in reach, Patient to call for help with toileting needs    History of Falls Interventions: Bed/chair exit alarm, Door open when patient unattended, Evaluate medications/consider consulting pharmacy, Room close to nurse's station         Problem: Pressure Injury - Risk of  Goal: *Prevention of pressure injury  Description  Pt will remain free from/ any further pressure injuries aeb no/progression pressure sores while at [facility].      Outcome: Progressing Towards Goal  Note:   Pressure Injury Interventions:  Sensory Interventions: Assess changes in LOC, Check visual cues for pain, Float heels, Keep linens dry and wrinkle-free, Maintain/enhance activity level, Minimize linen layers, Pressure redistribution bed/mattress (bed type)    Moisture Interventions: Absorbent underpads, Apply protective barrier, creams and emollients, Internal/External urinary devices, Maintain skin hydration (lotion/cream), Minimize layers, Moisture barrier    Activity Interventions: Pressure redistribution bed/mattress(bed type)    Mobility Interventions: Float heels, HOB 30 degrees or less, Pressure redistribution bed/mattress (bed type)    Nutrition Interventions: Document food/fluid/supplement intake, Offer support with meals,snacks and hydration    Friction and Shear Interventions: Apply protective barrier, creams and emollients, Feet elevated on foot rest, Foam dressings/transparent film/skin sealants, HOB 30 degrees or less, Lift sheet, Minimize layers                Problem: Pain  Goal: *Control of Pain  Description  Darío Diss will have pain control AEB a numeric score of 2 or less on a scale of 1-10.   Outcome: Progressing Towards Goal

## 2019-10-04 NOTE — [facility] NOTES
Patient: Katherine Olmstead. Date: 10/04/19  Time: 10:59 AM    [facility]  Notes  Intervention: Assessments for spiritual and bereavement care Provided a safe space for open nonjudgmental communication. Meaningful conversation with Mr. Mike Lamar as he reflected on his regrets, wishes and his relationship with God. Separate conversation with patient's sister and caregiver. Prayer. Outcome:  Mr. Mike Lamar was very open about his life, his regrets, his achievements and his hopes. Was able to articulate his feelings including his relationship with God. Affirmation of patient's forthcoming and willingness to reflect and share. Conversation with patient's sister around her brother's care. She affirmed her brother's being thankful for the opportunity to speak with [facility]. Plan:   will continue to be available for patient and family throughout his stay with [facility]. His In Home  will follow up.   See Care Plan        Signed by: Magen Santoro

## 2019-10-04 NOTE — [facility] IDG MASTER NOTE
[facility]  Date: 10/04/19  Time: 3:00 PM    ___________________    Patient: [place] Durant. Coverage Information:     Payor: [place]ARE     Plan: [place] MEDICARE PART A AND B     Subscriber ID: [id number]     Phone Number:   MRN: [id number]    Current Benefit Period: Benefit Period 3  Start Date: 9/30/2019  End Date: 11/28/2019      Medical Director: Marie Fox MD   Hospice Attending Provider: No Hospice attending provider. Level of Care: Routine Home Care      ___________________    Diagnoses:  Diagnoses of Cellulitis of right leg, Panlobular emphysema (HCC), Debility, Acute on chronic respiratory failure with hypercapnia ([address] and Neurogenic bladder disorder were pertinent to this visit.     Current Medications:    Current Facility-Administered Medications:     predniSONE (DELTASONE) tablet 10 mg, 10 mg, Oral, DAILY WITH BREAKFAST, Lizbeth Yip MD, 10 mg at 10/04/19 1105    acetaminophen (TYLENOL) suppository 650 mg, 650 mg, Rectal, Q3H PRN, [place] Bleacher, NP    bisacodyl (DULCOLAX) suppository 10 mg, 10 mg, Rectal, PRN, [place] Bleacher, NP    morphine injection 2 mg, 2 mg, SubCUTAneous, Q20MIN PRN **OR** [DISCONTINUED] morphine injection 2 mg, 2 mg, IntraVENous, Q20MIN PRN, [place] Bleacher, NP, 2 mg at 10/02/19 0259    ciprofloxacin HCl (CIPRO) tablet 500 mg, 500 mg, Oral, Q12H, [place] Bleacher, NP, 500 mg at 10/04/19 1104    doxycycline (VIBRAMYCIN) capsule 100 mg (Patient Supplied), 100 mg, Oral, Q12H, [place] Bleacher, NP, 100 mg at 10/04/19 1104    LORazepam (ATIVAN) tablet 1 mg, 1 mg, Oral, Q8H, Maeve Pinedo, NP, 1 mg at 10/04/19 1355    senna (SENOKOT) tablet 8.6 mg, 1 Tab, Oral, BID, [place] Bleacher, NP, 8.6 mg at 10/04/19 1105    morphine (ROXANOL) concentrated oral syringe 5 mg, 5 mg, Oral, Q30MIN PRN, 5 mg at 10/04/19 1106 **OR** morphine (ROXANOL) concentrated oral syringe 5 mg, 5 mg, SubLINGual, Q30MIN PRN, CARRIE King albuterol-ipratropium (DUO-NEB) 2.5 MG-0.5 MG/3 ML, 3 mL, Nebulization, Q4H PRN, Eunice Sender, NP    albuterol-ipratropium (DUO-NEB) 2.5 MG-0.5 MG/3 ML, 3 mL, Nebulization, Q6H RT, Eunice Sender, NP, 3 mL at 10/04/19 1354    pantoprazole (PROTONIX) tablet 40 mg, 40 mg, Oral, ACB, Eunice Sender, NP, 40 mg at 10/04/19 1632    LORazepam (ATIVAN) tablet 1 mg, 1 mg, Oral, Q4H PRN, Eunice Sender, NP, 1 mg at 10/04/19 1105    acetaminophen (TYLENOL) tablet 650 mg, 650 mg, Oral, Q4H PRN, Eunice Sender, NP    fluticasone-salmeterol (ADVAIR/WIXELA) 250mcg-50mcg/puff (Patient Supplied), 1 Puff, Inhalation, BID, Eunice Sender, NP, 1 Puff at 10/04/19 1104    pramipexole (MIRAPEX) tablet 1 mg (Patient Supplied), 1 mg, Oral, QHS, Eunice Sender, NP, 1 mg at 10/03/19 2146    famotidine (PEPCID) tablet 20 mg, 20 mg, Oral, BID, Eunice Sender, NP, 20 mg at 10/04/19 1104    Saccharomyces boulardii (FLORASTOR) capsule 250 mg (Patient Supplied), 250 mg, Oral, BID, Eunice Sender, NP, 250 mg at 10/04/19 1105    sertraline (ZOLOFT) tablet 100 mg, 100 mg, Oral, DAILY, Eunice Sender, NP, 100 mg at 10/04/19 1105    traZODone (DESYREL) tablet 150 mg, 150 mg, Oral, QHS, Eunice Sender, NP, 150 mg at 10/03/19 2146    Orders:  Orders Placed This Encounter    IP CONSULT TO SPIRITUAL CARE Once on week one, then PRN. For [facility] patients only. For contracted patients, primary hospice will continue to manage spiritual care needs     Once on week one, then PRN. For [facility] patients only. For contracted patients, primary hospice will continue to manage spiritual care needs     Standing Status:   Standing     Number of Occurrences:   1     Order Specific Question:   Reason for Consult:      Answer:   Spiritual crisis intervention or per patient or caregiver request    DIET REGULAR     Standing Status:   Standing     Number of Occurrences:   1    VITAL SIGNS     Standing Status:   Standing     Number of Occurrences:   1    VITAL SIGNS     Standing Status:   Standing     Number of Occurrences:   1    NURSING-MISCELLANEOUS: comfort measures Enter comfort measures above. CONTINUOUS     Enter comfort measures above. Standing Status:   Standing     Number of Occurrences:   1     Order Specific Question:   Description of Order:     Answer:   comfort measures    SIDHU CATHETER, CARE     1. Sidhu Catheter care every shift and PRN  2. Notify Physician of Sidhu Catheter leakage, occlusion, gross adherent sediment or accidental removal  3. Change Sidhu 30 days after insertion. 4. May flush catheter prn leakage or gross adherent sediment or mucus. Standing Status:   Standing     Number of Occurrences:   1    BLADDER CHECKS     May scan bladder PRN for urinary retention and or patient discomfort     Standing Status:   Standing     Number of Occurrences:   1    NURSING ASSESSMENT:  SPECIFY Assess for GIP, routine, or respite level of care. Q SHIFT Routine     Standing Status:   Standing     Number of Occurrences:   1     Order Specific Question:   Please describe the test or procedure you would like to order. Answer:   Assess for GIP, routine, or respite level of care.  PAIN ASSESSMENT Pain and Symptoms: Assess ever 4 hours and PRN, for GIP level of care. PRN Routine     Standing Status:   Standing     Number of Occurrences:   1     Order Specific Question:   Please describe the test or procedure you would like to order. Answer:   Pain and Symptoms: Assess ever 4 hours and PRN, for GIP level of care.  BEDREST, COMPLETE     Standing Status:   Standing     Number of Occurrences:   1    NURSING-MISCELLANEOUS: DME: Please order and place air mattress for pressure reduction. CONTINUOUS     Please order and place air mattress for pressure reduction.      Standing Status:   Standing     Number of Occurrences:   1     Order Specific Question:   Description of Order:     Answer:   DME:    NURSING-MISCELLANEOUS: admit 9/30: (SFE) Admitted GIP with hypercapnic respiratory failure for management of pain, dyspnea and agitation. Hospice Dx:  Hypercapnic respiratory failure. Associated Dx:   Gram-negative bacteremia, E. Coli UTI, soft ti. .. 9/30: (SFE) Admitted GIP with hypercapnic respiratory failure for management of pain, dyspnea and agitation. Hospice Dx:  Hypercapnic respiratory failure. Associated Dx:   Gram-negative bacteremia, E. Coli UTI, soft tissue abscess, presumed MRSA, bullous emphysema, ME, respiratory acidosis, and hypoalbuminemia. Non Associated Dx:  Remote polio, myelitis, and remote alcohol abuse. Standing Status:   Standing     Number of Occurrences:   1     Order Specific Question:   Description of Order:     Answer:   admit    NURSING-MISCELLANEOUS: Ask Family to provide Florastor 250mg capsules to take twice daily. Ask family to bring in mirapex and Advair inhaler from home. CONTINUOUS     Ask family to bring in mirapex and Advair inhaler from home. Standing Status:   Standing     Number of Occurrences:   1     Order Specific Question:   Description of Order:     Answer:   Ask Family to provide Florastor 250mg capsules to take twice daily.  NURSING-MISCELLANEOUS: Level of care change: -10/2: change to routine, initiate billing on 10/3  CONTINUOUS     -10/2: change to routine, initiate billing on 10/3     Standing Status:   Standing     Number of Occurrences:   1     Order Specific Question:   Description of Order:     Answer:   Level of care change:    WOUND CARE, DRESSING CHANGE     Wound Care:  Location: left posterior thigh   Abrasion: Please leave open to air. Notify provider if wound develops drainage. Open area: right knee clean with dermal wound and place hydrogel and cover with nonstick. Wrap in gauze.  Change every 48 hours and prn if soiled     Standing Status:   Standing     Number of Occurrences:   1    DO NOT RESUSCITATE     Standing Status: Standing     Number of Occurrences:   1    CONTACT ISOLATION     E. Coli in blood and urine. Presumed MRSA in wound. Standing Status:   Standing     Number of Occurrences:   1    VENTURI MASK     50% venti-mask with 15L/min via flowmeter. May place on 7L/min via oxymizer if unable to tolerate mask. Standing Status:   Standing     Number of Occurrences:   1     Order Specific Question:   Indications for O2 therapy     Answer:   HYPOXIA     Order Specific Question:   FIO2     Answer:   50%    DISCONTD: sodium chloride (NS) flush 3 mL    DISCONTD: sodium chloride (NS) flush 3 mL    acetaminophen (TYLENOL) suppository 650 mg    bisacodyl (DULCOLAX) suppository 10 mg    DISCONTD: glycopyrrolate (ROBINUL) injection 0.2 mg    morphine injection 2 mg    DISCONTD: morphine injection 2 mg    DISCONTD: LORazepam (ATIVAN) injection 1 mg    DISCONTD: LORazepam (ATIVAN) injection 1 mg    ciprofloxacin HCl (CIPRO) tablet 500 mg     Order Specific Question:   Antibiotic Indications     Answer:   Bloodstream Infection     Order Specific Question:   Antibiotic Indications     Answer:   Urinary Tract Infection     Order Specific Question:   UTI duration of therapy     Answer: Other     Comments:   8    doxycycline (VIBRAMYCIN) capsule 100 mg (Patient Supplied)     Order Specific Question:   Antibiotic Indications     Answer:    Other     Order Specific Question:   Other Abx Indication     Answer:   wound infection    LORazepam (ATIVAN) tablet 1 mg    senna (SENOKOT) tablet 8.6 mg    OR Linked Order Group     morphine (ROXANOL) concentrated oral syringe 5 mg     morphine (ROXANOL) concentrated oral syringe 5 mg    albuterol-ipratropium (DUO-NEB) 2.5 MG-0.5 MG/3 ML     Order Specific Question:   MODE OF DELIVERY     Answer:   Nebulizer    albuterol-ipratropium (DUO-NEB) 2.5 MG-0.5 MG/3 ML     Order Specific Question:   MODE OF DELIVERY     Answer:   Nebulizer    pantoprazole (PROTONIX) tablet 40 mg Order Specific Question:   PPI INDICATION     Answer:   Symptomatic GERD    LORazepam (ATIVAN) tablet 1 mg    DISCONTD: predniSONE (DELTASONE) tablet 20 mg    acetaminophen (TYLENOL) tablet 650 mg     OP SIG:Take 650 mg by mouth every four to six (4-6) hours as needed.  fluticasone-salmeterol (ADVAIR/WIXELA) 250mcg-50mcg/puff (Patient Supplied)     OP SIG:Take 1 Puff by inhalation two (2) times a day.  pramipexole (MIRAPEX) tablet 1 mg (Patient Supplied)     OP SIG:Take 1 mg by mouth nightly.  famotidine (PEPCID) tablet 20 mg     Order Specific Question:   H2RA INDICATION     Answer:   SUP Prophylaxis    Saccharomyces boulardii (FLORASTOR) capsule 250 mg (Patient Supplied)     OP SIG:Take 2 Caps by mouth daily for 7 days.  DISCONTD: senna (SENOKOT) tablet 8.6 mg     OP SIG:Take 1 Tab by mouth daily.  sertraline (ZOLOFT) tablet 100 mg     OP SIG:Take 100 mg by mouth daily.  traZODone (DESYREL) tablet 150 mg     OP SIG:Take 150 mg by mouth nightly. [employer] sends a 150mg tablet      DISCONTD: glycopyrrolate (ROBINUL) injection 0.2 mg    DISCONTD: LORazepam (ATIVAN) injection 1 mg    predniSONE (DELTASONE) tablet 10 mg    INITIAL PHYSICIAN ORDER: HOSPICE Level Of Care: General Inpatient; Reason for Admission: 9/30: (SFE) Admitted GIP with hypercapnic respiratory failure for management of pain, dyspnea and agitation. Standing Status:   Standing     Number of Occurrences:   1     Order Specific Question:   Status     Answer:   Hospice     Order Specific Question:   Level Of Care     Answer:   General Inpatient     Order Specific Question:   Reason for Admission     Answer:   9/30: (SFE) Admitted GIP with hypercapnic respiratory failure for management of pain, dyspnea and agitation. Order Specific Question:   Inpatient Hospitalization Certified Necessary for the Following Reasons     Answer:   3.  Patient receiving treatment that can only be provided in an inpatient setting (further clarification in H&P documentation)     Order Specific Question:   Admitting Diagnosis     Answer:   Hypercapnic respiratory failure [facility]) [[id number]]     Order Specific Question:   Terminal Prognosis Diagnosis(es)     Answer:   Hypercapnic respiratory failure [facility]) [[id number]]     Order Specific Question:   Admitting Physician     Answer:   Juan Cummings     Order Specific Question:   Attending Physician     Answer:   Juan Cummings     Order Specific Question:   Discharge Plan:     Answer: Other (Specify)    IP CONSULT TO SOCIAL WORK     Once on week one, then PRN for Psychosocial crisis intervention or per patient or caregiver request.  For [facility] patients only. For contracted patients, primary hospice will continue to manage social work needs. Standing Status:   Standing     Number of Occurrences:   1     Order Specific Question:   Reason for Consult: Answer:    Once on week one, then PRN for Psychosocial crisis intervention or per patient or caregiver request.       Allergies:  No Known Allergies    Care Plan:  Multidisciplinary Problems (Active)     Problem: Advance Directive, Legal Document Needs     Dates: Start: 10/02/19       Disciplines: Interdisciplinary    Goal: Patient will fill out appropriate document(s)     Dates: Start: 10/02/19   Expected End: 10/07/19       Description: Problem:  Planning for Death    Goals:   home will be chosen / arrangements will be made    Interventions:  Assess desires regarding end of life issues  Instruct and support the patient / family through the dying process  Encourage ventilate of feelings / fears  Assist with  home arrangements  Instruct family / facility / PCG  whom to call at the time of death     Disciplines: Interdisciplinary                Problem: Anticipatory Grief     Dates: Start: 10/01/19       Description:     Disciplines: Interdisciplinary    Goal: Explore reactions to and verbalize acceptance of impending loss     Dates: Start: 10/01/19   Expected End: 10/15/19       Description: Patient/family/caregiver will explore reactions to and verbalize acceptance of impending loss. Disciplines: Interdisciplinary    Intervention: Assess grief responses     Dates: Start: 10/01/19       Description:           Intervention: Assist with grief counseling     Dates: Start: 10/01/19       Description:  to assist.          Intervention: [place] Li on stages of grief     Dates: Start: 10/01/19       Description: Instruct patient/caregiver on stages of grief. Intervention: Offer grief support group     Dates: Start: 10/01/19       Description: Patient/family/caregiver will explore reactions to and verbalize acceptance of impending loss. Problem: Anticipatory Grief     Dates: Start: 10/01/19       Description:     Disciplines: Interdisciplinary    Goal: Explore Reactions To and Verbalize Acceptance of Impending Loss     Dates: Start: 10/01/19   Expected End: 10/07/19       Description: Patient/family/caregiver will explore reactions to and verbalize acceptance of impending loss.     Disciplines: Interdisciplinary    Intervention: Assess grief responses     Dates: Start: 10/01/19       Description: Assess for feelings of grief          Intervention: Instruct on grief process and coping strategies     Dates: Start: 10/01/19       Description: Instruct patient/caregiver on the grief process and effective coping strategies            Intervention: Provide grief counseling/education     Dates: Start: 10/01/19       Description: Provide patient/caregiver grief counseling and educate on community resources          Intervention: Refer to grief support in community     Dates: Start: 10/01/19       Description: Refer patient/caregiver to community resources for grief support                      Problem: Anxiety/Agitation     Dates: Start: 10/01/19       Description:     Disciplines: Interdisciplinary    Goal: Verbalize and demonstrate ability to manage anxiety     Dates: Start: 10/01/19   Expected End: 10/15/19       Description: The patient/family/caregiver will verbalize and demonstrate ability to manage the patient's anxiety throughout hospice care. Disciplines: Interdisciplinary    Intervention: Assess for anxiety/agitation     Dates: Start: 10/01/19       Description: Assess for signs and symptoms of anxiety and agitation. Intervention: Instruction strategies to reduce anxiety/agitation     Dates: Start: 10/01/19       Description: Instruct patient/caregiver on strategies to reduce anxiety/agitation. Problem: Coping and Emotional Distress     Dates: Start: 10/01/19       Description:     Disciplines: Interdisciplinary    Goal: Demonstrate acceptance of terminal illness and understanding of disease progression     Dates: Start: 10/01/19   Expected End: 10/15/19       Description: Patient/family/caregiver will demonstrate acceptance of terminal disease and understanding of disease progression while employing appropriate coping mechanisms. Disciplines: Interdisciplinary    Intervention: Assess for alteration in coping     Dates: Start: 10/01/19       Description:           Intervention: Assess for signs/symptoms of emotional distress     Dates: Start: 10/01/19       Description:           Intervention: Instruct on effective coping skills     Dates: Start: 10/01/19       Description: Instruct patient/caregiver on effective coping skills. Intervention: Instruct on strategies to reduce emotional distress     Dates: Start: 10/01/19       Description: Instruct patient/caregiver on strategies to reduce emotional distress.                       Problem: DISCHARGE PLANNING     Dates: Start: 10/02/19       Disciplines: Interdisciplinary    Goal: *DISCHARGE TO SAFE ENVIRONMENT     Dates: Start: 10/02/19   Expected End: 10/07/19       Description: Problem: Discharge Planning      Goal:   Implement a Safe Discharge Plan to the patient or families choosing    Interventions:  Identify Available Caregivers and Support System  Assess / Coordinate for [employer] Resource Needs   Assess / Coordinate DME Needs  Assess / Refer for Informal Supports Need   Arrange Transportation       Disciplines: Interdisciplinary                Problem: Dying Process     Dates: Start: 10/01/19       Description:     Disciplines: Interdisciplinary    Goal: Increased peace with dying process, patient coping identified, patient/family expressed gratitude, spiritual distress identified and decreased with visit     Dates: Start: 10/01/19   Expected End: 10/07/19       Description:     Disciplines: Interdisciplinary    Intervention: Assess coping status     Dates: Start: 10/01/19       Description: Assess for ability to cope with death                      Problem: End of Life Process     Dates: Start: 10/01/19       Description:     Disciplines: Interdisciplinary    Goal: Demonstrate understanding of end of life processes     Dates: Start: 10/01/19   Expected End: 10/15/19       Description: Israel Fisher will understand end of life processes.     Disciplines: Interdisciplinary    Intervention: Assess for signs/symptoms of terminal restlessness     Dates: Start: 10/01/19       Description:           Intervention: Instruct on strategies to reduce terminal restlessness     Dates: Start: 10/01/19       Description:           Intervention: Instruct on the dying process     Dates: Start: 10/01/19       Description:           Intervention: Instruct: imminent death     Dates: Start: 10/01/19       Description:           Intervention: Instruct: process at end of life     Dates: Start: 10/01/19       Description:                       Problem: Falls - Risk of     Dates: Start: 09/30/19       Description:     Disciplines: Interdisciplinary    Goal: *Absence of Falls     Dates: Start: 09/30/19   Expected End: 10/08/19       Description: Pt will remain free from falls aeb no injuries while at [facility]. Disciplines: Interdisciplinary                Problem: Pain     Dates: Start: 10/01/19       Description:     Disciplines: Nurse, Interdisciplinary, RT    Goal: *Control of Pain     Dates: Start: 10/01/19   Expected End: 10/08/19       Description: Karishma Jansen will have pain control AEB a numeric score of 2 or less on a scale of 1-10.     Disciplines: Nurse, Interdisciplinary, RT    Intervention: Assess pain characteristics (eg: Intensity scale; onset; location; quality; severity; duration; frequency; radiation)     Dates: Start: 10/01/19       Description:           Intervention: Assess pain management - barriers (eg: Past pain experiences)     Dates: Start: 10/01/19       Description:           Intervention: Identify pain expectations (eg: Patient's pain goal; somatic experiences; behavioral changes; affect)     Dates: Start: 10/01/19       Description:           Intervention: Identify pain medication concerns (eg: Cultural considerations; addiction concerns)     Dates: Start: 10/01/19       Description:           Intervention: Support system identification (eg: Caregiver; community resource; family; friends; Judaism; support group)     Dates: Start: 10/01/19       Description:           Intervention: Monitor for change in patient condition (eg:  Vital signs changes; changes in level of consciousness; nausea; behavioral changes)     Dates: Start: 10/01/19       Description:           Intervention: Medication side-effect assessment     Dates: Start: 10/01/19       Description:           Intervention: Pain-relief response reassessment (eg: Frequency based on route of administration; effectiveness)     Dates: Start: 10/01/19       Description:                       Problem: Pressure Injury - Risk of     Dates: Start: 09/30/19       Description:     Disciplines: Interdisciplinary    Goal: *Prevention of pressure injury     Dates: Start: 09/30/19   Expected End: 10/15/19       Description: Pt will remain free from/ any further pressure injuries aeb no/progression pressure sores while at [facility]. Disciplines: Interdisciplinary                Problem: Pshychosocial General     Dates: Start: 10/02/19       Disciplines: Interdisciplinary    Goal: Patient/family is receiving emotional support     Dates: Start: 10/02/19   Expected End: 10/07/19       Priority: High    Description: Problem: SW: Patient Standard/ Initial Plan of Care-Social Work    Goals: 1. Patient and family will experience a smooth and safe transition to hospice care   2. Long Term Goal: To minimize pain and discomfort while maximizing quality of life for the \  patient and providing support for the family /patient caregiver. Intervention: 1. Social Work Assessment            2. Assess patient /family/ caregiver expectations from hospice. 3. Assess desires regarding end-of-life issues. 4. Assess degrees and stages of grief. 5. Assessment of pain and coping at every visit. 6. Assess the patient psycho-social, emotional cultural implications of pain. 7. Assess mental/ emotional status and need for additional resources every visit. 8. Assess PCG anxiety and fatigue in care plan delivery. Disciplines: Interdisciplinary                Problem: Risk for Spread of Infection     Dates: Start: 09/30/19       Description:     Disciplines: Interdisciplinary    Goal: Prevent transmission of infectious organism to others     Dates: Start: 09/30/19   Expected End: 10/15/19       Description: Prevent the transmission of infectious organisms to other patients, staff members, and visitors.     Disciplines: Interdisciplinary    Intervention: Verify correct isolation order has been placed for confirmed or suspected infection     Dates: Start: 09/30/19 Description:           Intervention: Proper isolation precautions placed on patient door     Dates: Start: 09/30/19       Description:           Intervention: Proper PPE utilized by all who enter patient's room     Dates: Start: 09/30/19       Description:           Intervention: Proper hand hygiene     Dates: Start: 09/30/19       Description: Use alcohol based hand  or soap and water. Intervention: Proper decontamination of surfaces and shared equipment/devices     Dates: Start: 09/30/19       Description:                       Problem: Spiritual Distress     Dates: Start: 10/01/19       Description:     Disciplines: Interdisciplinary    Goal: Distress heard, acknowledged, and addressed     Dates: Start: 10/01/19   Expected End: 10/15/19       Description: Patient/family/caregiver distress will be heard, acknowledged, and addressed throughout hospice care. Disciplines: Interdisciplinary    Intervention: Assess for signs/symptoms of spiritual distress     Dates: Start: 10/01/19       Description:           Intervention: Consult on spiritual care     Dates: Start: 10/01/19       Description: Consult to Spiritual Care          Intervention: Discuss strategies to reduce spiritual distress     Dates: Start: 10/01/19       Description: Discuss with patient/caregiver strategies to reduce spiritual distress.                         Care Plan Problems/Goals      Progressing Towards Goal (12)     Prevent transmission of infectious organism to others     Problem: Risk for Spread of Infection    Disciplines: Interdisciplinary    Expected end:  10/15/19     Progressing Towards Goal By Yoly Wade RN on 10/04/19 6028               *Absence of Falls     Problem: Falls - Risk of    Disciplines: Interdisciplinary    Expected end:  10/08/19     Progressing Towards Goal By Yoly Wade RN on 10/04/19 8240               *Prevention of pressure injury     Problem: Pressure Injury - Risk of Disciplines: Interdisciplinary    Expected end:  10/15/19     Progressing Towards Goal By Lu Green RN on 10/04/19 9626               Explore reactions to and verbalize acceptance of impending loss     Problem: Anticipatory Grief    Disciplines: Interdisciplinary    Expected end:  10/15/19     Progressing Towards Goal By Yury Thompson RN on 10/03/19 8043               Verbalize and demonstrate ability to manage anxiety     Problem: Anxiety/Agitation    Disciplines: Interdisciplinary    Expected end:  10/15/19     Progressing Towards Goal By Yury Thompson RN on 10/03/19 3029               Demonstrate acceptance of terminal illness and understanding of disease progression     Problem: Coping and Emotional Distress    Disciplines: Interdisciplinary    Expected end:  10/15/19     Progressing Towards Goal By Yury Thompson RN on 10/03/19 0509               Distress heard, acknowledged, and addressed     Problem: Spiritual Distress    Disciplines: Interdisciplinary    Expected end:  10/15/19     Progressing Towards Goal By Franck Morales RN on 10/03/19 0403               Demonstrate understanding of end of life processes     Problem: End of Life Process    Disciplines: Interdisciplinary    Expected end:  10/15/19     Progressing Towards Goal By Franck Morales RN on 10/03/19 0403               *Control of Pain     Problem: Pain    Disciplines: Nurse, Interdisciplinary, RT    Expected end:  10/08/19     Progressing Towards Goal By Lu Green RN on 10/04/19 9040               Patient/family is receiving emotional support     Problem: Pshychosocial General    Disciplines: Interdisciplinary    Expected end:  10/07/19     Progressing Towards Goal By Jatinder Kong on 10/04/19 1039               *DISCHARGE TO SAFE ENVIRONMENT     Problem: DISCHARGE PLANNING    Disciplines: Interdisciplinary    Expected end:  10/07/19     Progressing Towards Goal By Jatinder Kong on 10/04/19 [phone]               Patient will fill out appropriate document(s)     Problem: Advance Directive, Legal Document Needs    Disciplines: Interdisciplinary    Expected end:  10/07/19     Progressing Towards Goal By Sangeetha Bernard on 10/04/19 1039                      No Outcome (2)     Explore Reactions To and Verbalize Acceptance of Impending Loss     Problem: Anticipatory Grief    Disciplines:     Expected end:  10/07/19                 Increased peace with dying process, patient coping identified, patient/family expressed gratitude, spiritual distress identified and decreased with visit     Problem: Dying Process    Disciplines:     Expected end:  10/07/19                             ___________________    Care Team Notes          POC/IDG Notes      [facility] IDG Nurse Notes by Niall Cao RN at 10/04/19 1143  Version 1 of 1    Author:  Niall Cao RN Service:  Hitesh Castro Author Type:  Registered Nurse    Filed:  10/04/19 0223 Date of Service:  10/04/19 1143 Status:  Signed    :  Niall Cao RN (Registered Nurse)       Patient: Gia Castañeda. Date: 10/04/19  Time: 11:43 AM    [facility] Nurse Notes  Symptoms are currently managed. Pt is being discharged on Monday at 9:30 am for RHC. Pt will reside with sister. Prednisone decreased to 10 mg q day. Roxanol 5 mg x 5 doses PO and Ativan 1 mg x 2 doses in the last 24 hours. Wound to R knee that was lanced in the hospital with dressing intact. Cipro and Doxycycline being administered. Scheduled Nebs q 6 hours. No other changes in plan of care. Comprehensive plan of care reviewed. IDG and pt./family in agreement with plan of care. The IDG identifies through on-going assessment when a change is needed to the POC; the pt/family will receive care and services necessitated by changes in POC. Medications reviewed by the pharmacist and Medical Director.             Signed by: Alberto Dupont RN       [address] [place] ID[facility]  Notes by Salazar Unger at 10/04/19 1059  Version 1 of 1    Author:  Shellie Oneal Service:  Spiritual Care Author Type:  Pastoral Care    Filed:  10/04/19 1150 Date of Service:  10/04/19 1059 Status:  Signed    :  Shellie Oneal (Pastoral Care)       Patient: Lorenza Johnson. Date: 10/04/19  Time: 10:59 AM    [facility]  Notes  Intervention: Assessments for spiritual and bereavement care Provided a safe space for open nonjudgmental communication. Meaningful conversation with Mr. Sudheer Raya as he reflected on his regrets, wishes and his relationship with God. Separate conversation with patient's sister and caregiver. Prayer. Outcome:  Mr. Sudheer Raya was very open about his life, his regrets, his achievements and his hopes. Was able to articulate his feelings including his relationship with God. Affirmation of patient's forthcoming and willingness to reflect and share. Conversation with patient's sister around her brother's care. She affirmed her brother's being thankful for the opportunity to speak with [facility]. Plan:   will continue to be available for patient and family throughout his stay with Supriya Torres. His In Home  will follow up. See Care Plan        Signed by: Piedad MORALES [facility] ID[facility]  Notes by Fabrice Pinedo at 10/04/19 1037  Version 1 of 1    Author:  Fabrice Pinedo Service:  Licensed Clinical  Author Type:      Filed:  10/04/19 1147 Date of Service:  10/04/19 1037 Status:  Signed    :  Fabrice Pinedo ()       Patient: Lorenza Johnson. Date: 10/04/19  Time: 10:37 AM    [facility]  Notes  Pt was here [facility]. He has changed to routine level of care on 10-3-19. Sister is aware of the $256.00 a day room and board fee. Discharge plans are as follows:  9:30 am Aliyah Allison will transport pt home to his sisters home. In home program is aware of the discharge plan and time.     Pt is not receiving volunteers support due to contact precautions. Problem: Psychosocial General  Goal: Patient/family is receiving emotional support  Description  Problem: SW: Patient Standard/ Initial Plan of Care-Social Work    Goals: 1. Patient and family will experience a smooth and safe transition to hospice care   2. Long Term Goal: To minimize pain and discomfort while maximizing quality of life for the \  patient and providing support for the family /patient caregiver. Intervention: 1. Social Work Assessment            2. Assess patient /family/ caregiver expectations from hospice. 3. Assess desires regarding end-of-life issues. 4. Assess degrees and stages of grief. 5. Assessment of pain and coping at every visit. 6. Assess the patient psycho-social, emotional cultural implications of pain. 7. Assess mental/ emotional status and need for additional resources every visit. 8. Assess PCG anxiety and fatigue in care plan delivery. Outcome: Progressing Towards Goal     Problem: DISCHARGE PLANNING  Goal: *DISCHARGE TO SAFE ENVIRONMENT  Description  Problem:  Discharge Planning      Goal:   Implement a Safe Discharge Plan to the patient or families choosing    Interventions:  Identify Available Caregivers and Support System  Assess / Coordinate for [employer] Resource Needs   Assess / Coordinate DME Needs  Assess / Refer for Informal Supports Need   Arrange Transportation        Signed by: Lisa Mcconnell       [facility]  Notes by Tammy Maya at 10/01/19 1737  Version 1 of 1    Author:  Tammy Maya Service:  Spiritual Care Author Type:  Pastoral Care    Filed:  10/01/19 1739 Date of Service:  10/01/19 1737 Status:  Signed    :  Tammy Maya ([address])       Patient: Nahum Kellogg. Date: 10/01/19  Time: 5:37 PM    [facility]  Notes   has reviewed the Initial Comprehensive assessment and Plan of Care for Mr.Jessie Gamez. / Bereavement Counselor is in agreement with the assessment and plan of care thus far. [employer] / Bereavement Counselor has completed   Initial Spiritual and Bereavement Assessments and  Will provide care according to needs and desires identified. Signed by: Seferino MORALES [facility] IDG  Notes by Steven Brady at 10/01/19 0908  Version 1 of 1    Author:  Steven Brady Service:  Licensed Clinical  Author Type:      Filed:  10/01/19 0909 Date of Service:  10/01/19 0908 Status:  Signed    :  Steven Brady ()       Patient: Florida Durant. Date: 10/01/19  Time: 9:08 AM    [facility]  Notes  LMSW has reviewed the initial Rn Comprehensive assessment and plan of care. Acknowledge there is no immediate needs for SW. Signed by: Wade Arnett IDG Nurse Notes by Eunice Norris RN at 09/30/19 1141  Version 1 of 1    Author:  Eunice Norris RN Service:  NURSING Author Type:  Registered Nurse    Filed:  09/30/19 1625 Date of Service:  09/30/19 1141 Status:  Signed    :  Eunice Norris RN (Registered Nurse)       [phone]: Pt arrived via EMS. Sister, Kellie Winkler following behind. 68year old male patient admitted to room 115 with hospice diagnosis of hypercapneic respiratory failure. Level of care is general inpatient for management of pain, dyspnea, agitation, wound care. Description: Pt is on 15 L venturi mask continuous and per discharging RN, Pt de-sats when off the O2 but quickly recovers once placed back on O2. Pt removes mask at times out of confusion and becomes more confused. Needs reorientation and reassurance. Recent HPI: Pt is positive for ecoli in blood and urine. Castillo catheter containing UOP and wounds to BLE most likely with MRSA infxn.      Current symptom: pain and dyspnea and anxiety    Functional status:Pt becomes extremely anxious and SOB with any activity so activity is limited to BR. Castillo cathter in place. PO intake:Regular diet but unable to tolerate much eating d/t amount of O2 required continuously. Code status:DNR    Other PMH:Neurogenic bladder, polio, myelitis, and history of ETOH and benzo use    Family dynamics:Currently been residing with sister, Giulia Yi but the relationship is strained at times. Both have their own issues to deal with. Sister lost spouse. Pt has been  twice from same lady     plans: Unknown at this time. Wound care: 1600: Wound care completed with Sergio Leung NP and Ezio Jules student at bedside assisting. Please see media pictures. Wound to inner right knee 3 cm x 2.5 cm open with slough present and pustulant, serosanguinous drainage noted. Covered with telfa and wrapped in gauze with paper tape to secure. Wound to left upper posterior leg 1 cm x 1 cm with pustulant drainage noted. Covered with mepilex. Pt tolerated well and stated pain 4/10 post procedure. Pt requested medication for anxiety and this was relayed to [facility], Primary RN. Sister left shortly after arrival stating she was \"completely wiped out and must go home for a nap\". RN requested home meds and Sister stated she did not bring them and would have to bring them after her nap. Admission complete and initial general hospice care plan initiated which includes spiritual, psychosocial, and bereavement. Immediate needs recognized for wound care, family care including social work and possible difficulty with bereavement on sister's part when EOL nears. IDG team made aware of plan of care and immediate needs.                 Care Team Present:   Team Members Present: Physician, , Bereavement, Nurse, Vol Coordinator,   Physician Team Member: Girish Mayo MD   Nurse Team Member: Johnathon Renee RN  Social Work Team Member: Lalo Valencia [facility]  [address] Team Member: Amairani Jack   Vol Coordinator: Rayshawn Acosta

## 2019-10-04 NOTE — PROGRESS NOTES
I saw Terry Arzola and afternoon rounds. He was awake and responding to questions but was confused about recent events. His oxygen was off his nose and after replacing his nasal Oxymizer device flowing at 5 L/m, his cognition improved significantly. I discussed with him the plan for him to return home to his sister's house on October 7. This will allow him to complete 10 days of ciprofloxacin,/doxycycline under direct nursing observation with assured compliance. Inspection of the wound on his right medial lower leg below the patellar margin finds increasing sloughing and exudate versus exam 10/30/2019. I plan to change dressing material to wet to moist every 12 hours in an attempt to passively debride the shallow ulcer. There does not appear to be a cavity requiring packing  I will reduce the prednisone dose from 20 to 10 mg by mouth dailywith the name of reducing immunosuppression. to  Allow the patient to clear this deep soft tissue infection. I have spoken with LILLIAN Love regarding discharge plan. She has spoken with the patient's FDC sister and made her aware of the change in administrative status from [facility] to routine as well as the room and board charges for care at [address] [place] [address] under routine hospice care.     Signed [facility]

## 2019-10-04 NOTE — PROGRESS NOTES
Progress Note    Patient: Paige Camargo MRN: [id number]  SSN: [id number]    YOB: 1950  Age: 68 y.o. Sex: male      Admit Date: 9/30/2019    LOS: 4 days       Subjective:     Confused, eating less. Taking all meds by mouth. Denies needs. Objective:     Vitals:    10/02/19 1600 10/03/19 0337 10/03/19 1610 10/04/19 0304   BP: 127/72 126/77 135/61 131/74   Pulse: 76 78 78 80   Resp: 19 20 19 18   Temp: 97.3 °F (36.3 °C) 95.9 °F (35.5 °C) 97 °F (36.1 °C) 97.9 °F (36.6 °C)        Intake and Output:  Current Shift: 10/04 0701 - 10/04 1900  In: 120 [P.O.:120]  Out: -   Last three shifts: 10/02 1901 - 10/04 0700  In: 460 [P.O.:460]  Out: 1875 [Urine:1875]      Assessment:     Principal Problem:    Hypercapnic respiratory failure (HCC) (9/30/2019)    Active Problems:    COPD (chronic obstructive pulmonary disease) (HCC) (11/16/2018)      Overview: Last Assessment & Plan:       Severe. Patient currently not taking his Spiriva. Cost is an issue. We       will change to Trelegy. Purpose and proper use of inhaler was shown with       good return demonstration. Continue with theophylline although patient       says he does not think it helps      Debility (1/16/2019)      Overview: Last Assessment & Plan:       Supportive care, getting home care, monitor.       Depression (6/19/2019)      Post-polio syndrome (11/16/2018)      Overview: Last Assessment & Plan:       Supportive care      Cellulitis of right leg (9/25/2019)        Plan:     Current Facility-Administered Medications   Medication Dose Route Frequency    predniSONE (DELTASONE) tablet 10 mg  10 mg Oral DAILY WITH BREAKFAST    acetaminophen (TYLENOL) suppository 650 mg  650 mg Rectal Q3H PRN    bisacodyl (DULCOLAX) suppository 10 mg  10 mg Rectal PRN    morphine injection 2 mg  2 mg SubCUTAneous Q20MIN PRN    ciprofloxacin HCl (CIPRO) tablet 500 mg  500 mg Oral Q12H    doxycycline (VIBRAMYCIN) capsule 100 mg (Patient Supplied) 100 mg Oral Q12H    LORazepam (ATIVAN) tablet 1 mg  1 mg Oral Q8H    senna (SENOKOT) tablet 8.6 mg  1 Tab Oral BID    morphine (ROXANOL) concentrated oral syringe 5 mg  5 mg Oral Q30MIN PRN    Or    morphine (ROXANOL) concentrated oral syringe 5 mg  5 mg SubLINGual Q30MIN PRN    albuterol-ipratropium (DUO-NEB) 2.5 MG-0.5 MG/3 ML  3 mL Nebulization Q4H PRN    albuterol-ipratropium (DUO-NEB) 2.5 MG-0.5 MG/3 ML  3 mL Nebulization Q6H RT    pantoprazole (PROTONIX) tablet 40 mg  40 mg Oral ACB    LORazepam (ATIVAN) tablet 1 mg  1 mg Oral Q4H PRN    acetaminophen (TYLENOL) tablet 650 mg  650 mg Oral Q4H PRN    fluticasone-salmeterol (ADVAIR/WIXELA) 250mcg-50mcg/puff (Patient Supplied)  1 Puff Inhalation BID    pramipexole (MIRAPEX) tablet 1 mg (Patient Supplied)  1 mg Oral QHS    famotidine (PEPCID) tablet 20 mg  20 mg Oral BID    Saccharomyces boulardii (FLORASTOR) capsule 250 mg (Patient Supplied)  250 mg Oral BID    sertraline (ZOLOFT) tablet 100 mg  100 mg Oral DAILY    traZODone (DESYREL) tablet 150 mg  150 mg Oral QHS       Changed to routine level of care on 10/2. Remains appropriate for routine level of care. Social work assisting family with discharge plans. His sister has been the primary caregiver. Prednisone was decreased to 10mg daily yesterday. He will complete his antibiotics on Monday. Plans for discharge on Monday if no change in status.      Signed By: Hernan Morrow NP     October 4, 2019

## 2019-10-04 NOTE — PROGRESS NOTES
Problem: Psychosocial General  Goal: Patient/family is receiving emotional support  Description  Problem: SW: Patient Standard/ Initial Plan of Care-Social Work    Goals: 1. Patient and family will experience a smooth and safe transition to hospice care   2. Long Term Goal: To minimize pain and discomfort while maximizing quality of life for the \  patient and providing support for the family /patient caregiver. Intervention: 1. Social Work Assessment            2. Assess patient /family/ caregiver expectations from hospice. 3. Assess desires regarding end-of-life issues. 4. Assess degrees and stages of grief. 5. Assessment of pain and coping at every visit. 6. Assess the patient psycho-social, emotional cultural implications of pain. 7. Assess mental/ emotional status and need for additional resources every visit. 8. Assess PCG anxiety and fatigue in care plan delivery.       Outcome: Progressing Towards Goal     Problem: DISCHARGE PLANNING  Goal: *DISCHARGE TO SAFE ENVIRONMENT  Description  Problem:  Discharge Planning      Goal:   Implement a Safe Discharge Plan to the patient or families choosing    Interventions:  Identify Available Caregivers and Support System  Assess / Coordinate for [employer] Resource Needs   Assess / Coordinate DME Needs  Assess / Refer for Informal Supports Need   Arrange Transportation     Outcome: Progressing Towards Goal     Problem: Advance Directive, Legal Document Needs  Goal: Patient will fill out appropriate document(s)  Description  Problem:  Planning for Death    Goals:   home will be chosen / arrangements will be made    Interventions:  Assess desires regarding end of life issues  Instruct and support the patient / family through the dying process  Encourage ventilate of feelings / fears  Assist with  home arrangements  Instruct family / facility / PCG  whom to call at the time of death   Outcome: Progressing Towards Goal

## 2019-10-04 NOTE — [facility] ID[facility] SOCIAL WORKER NOTES
Patient: Devora Hubbard. Date: 10/04/19  Time: 10:37 AM    [facility]  Notes  Pt was here [facility]. He has changed to routine level of care on 10-3-19. Sister is aware of the $256.00 a day room and board fee. Discharge plans are as follows:  9:30 am Angie Evans will transport pt home to his sisters home. In home program is aware of the discharge plan and time. Pt is not receiving volunteers support due to contact precautions. Problem: Psychosocial General  Goal: Patient/family is receiving emotional support  Description  Problem: SW: Patient Standard/ Initial Plan of Care-Social Work    Goals: 1. Patient and family will experience a smooth and safe transition to hospice care   2. Long Term Goal: To minimize pain and discomfort while maximizing quality of life for the \  patient and providing support for the family /patient caregiver. Intervention: 1. Social Work Assessment            2. Assess patient /family/ caregiver expectations from hospice. 3. Assess desires regarding end-of-life issues. 4. Assess degrees and stages of grief. 5. Assessment of pain and coping at every visit. 6. Assess the patient psycho-social, emotional cultural implications of pain. 7. Assess mental/ emotional status and need for additional resources every visit. 8. Assess PCG anxiety and fatigue in care plan delivery.       Outcome: Progressing Towards Goal     Problem: DISCHARGE PLANNING  Goal: *DISCHARGE TO SAFE ENVIRONMENT  Description  Problem:  Discharge Planning      Goal:   Implement a Safe Discharge Plan to the patient or families choosing    Interventions:  Identify Available Caregivers and Support System  Assess / Coordinate for [employer] Resource Needs   Assess / Coordinate DME Needs  Assess / Refer for Informal Supports Need   Arrange Transportation        Signed by: Rex Essex

## 2019-10-04 NOTE — [facility] IDG NURSE NOTES
Patient: Harriet Erickson. Date: 10/04/19  Time: 11:43 AM    [facility] Nurse Notes  Symptoms are currently managed. Pt is being discharged on Monday at 9:30 am for RHC. Pt will reside with sister. Prednisone decreased to 10 mg q day. Roxanol 5 mg x 5 doses PO and Ativan 1 mg x 2 doses in the last 24 hours. Wound to R knee that was lanced in the hospital with dressing intact. Cipro and Doxycycline being administered. Scheduled Nebs q 6 hours. No other changes in plan of care. Comprehensive plan of care reviewed. IDG and pt./family in agreement with plan of care. The IDG identifies through on-going assessment when a change is needed to the POC; the pt/family will receive care and services necessitated by changes in POC. Medications reviewed by the pharmacist and Medical Director.             Signed by: Louis Reynolds RN

## 2019-10-05 NOTE — PROGRESS NOTES
1910 Received report from Russell Antonio RN. PT resting quietly with eyes closed. No s/sx of pain or respiratory distress. Castillo intact. Safety measures in place. Bed in low locked position, tab alert intact and room is close to the nursing station. Pt has no s/sx of distress. 2030 Pt coming out of bed. O2 off. Pt assisted back to bed with assist of x2 staff. Complain of pain and confusion noted. Pt uncooperative with taking his PO medication. Pt medicated with Roxanol per orders. 2100 Pt asleep, no s/sx of pain or respiratory distress. 2215 Pt cooperative and pleasant. complained of back pain FLACC 5/10. Pt medicated with Roxanol per orders alone with his scheduled medications. Pt alarm went off and he was found on the side of the bed. Pt assisted back to bed and given warm blanket and repositioned for comfort. 2250 Pt sound asleep, no s/sx of pain or respiratory distress. 0053 Pt pleasantly confused, sitting at side of bed attempting to silence alarm. Pt report knee hurting. Bandage to right knee loosely intact from patient touching it. Bandage reinforced and pt medicated with Roxanol for pain. Safety measures are intact. Pt room is close to the nursing station and his door is open and the tab alert is intact and call light in reach. 0125 Pt resting with eyes closed, no s/sx of pain or distress. 0354 Pt has slept in 2- 4 hour intervals awakening for symptom management of pain or anxiety. Pt resting with eyes closed, no s/sx of pain or distress. 0547 Pt up, alert to person with confusion. Pt is able to take medications PO and he will follow simple commands. I turned on his TV per his request. No s/sx of distress. No c/o pain at present or respiratory distress. End of shift report to Fabby Edwards RN.

## 2019-10-05 NOTE — PROGRESS NOTES
Problem: Risk for Spread of Infection  Goal: Prevent transmission of infectious organism to others  Description  Prevent the transmission of infectious organisms to other patients, staff members, and visitors. Outcome: Progressing Towards Goal     Problem: Falls - Risk of  Goal: *Absence of Falls  Description  Pt will remain free from falls aeb no injuries while at [facility]. Outcome: Progressing Towards Goal  Note:   Fall Risk Interventions:  Mobility Interventions: Bed/chair exit alarm    Mentation Interventions: Adequate sleep, hydration, pain control, Bed/chair exit alarm, Door open when patient unattended, Evaluate medications/consider consulting pharmacy, More frequent rounding, Reorient patient, Room close to nurse's station, Eyeglasses and hearing aids    Medication Interventions: Bed/chair exit alarm, Evaluate medications/consider consulting pharmacy    Elimination Interventions: Bed/chair exit alarm, Call light in reach    History of Falls Interventions: Bed/chair exit alarm, Door open when patient unattended, Evaluate medications/consider consulting pharmacy, Room close to nurse's station         Problem: Pressure Injury - Risk of  Goal: *Prevention of pressure injury  Description  Pt will remain free from/ any further pressure injuries aeb no/progression pressure sores while at [facility].      Outcome: Progressing Towards Goal  Note:   Pressure Injury Interventions:  Sensory Interventions: Assess changes in LOC, Assess need for specialty bed, Avoid rigorous massage over bony prominences, Check visual cues for pain, Float heels, Maintain/enhance activity level, Minimize linen layers, Monitor skin under medical devices, Pressure redistribution bed/mattress (bed type)    Moisture Interventions: Absorbent underpads, Apply protective barrier, creams and emollients, Check for incontinence Q2 hours and as needed, Contain wound drainage, Internal/External urinary devices, Maintain skin hydration (lotion/cream), Minimize layers, Moisture barrier    Activity Interventions: Pressure redistribution bed/mattress(bed type)    Mobility Interventions: HOB 30 degrees or less, Pressure redistribution bed/mattress (bed type), Assess need for specialty bed, Float heels    Nutrition Interventions: Document food/fluid/supplement intake    Friction and Shear Interventions: Apply protective barrier, creams and emollients, Foam dressings/transparent film/skin sealants, HOB 30 degrees or less, Lift team/patient mobility team, Minimize layers, Sit at 90-degree angle                Problem: Anticipatory Grief  Goal: Explore reactions to and verbalize acceptance of impending loss  Description  Patient/family/caregiver will explore reactions to and verbalize acceptance of impending loss. Outcome: Progressing Towards Goal     Problem: Anxiety/Agitation  Goal: Verbalize and demonstrate ability to manage anxiety  Description  The patient/family/caregiver will verbalize and demonstrate ability to manage the patient's anxiety throughout hospice care. Outcome: Progressing Towards Goal     Problem: Coping and Emotional Distress  Goal: Demonstrate acceptance of terminal illness and understanding of disease progression  Description  Patient/family/caregiver will demonstrate acceptance of terminal disease and understanding of disease progression while employing appropriate coping mechanisms. Outcome: Progressing Towards Goal     Problem: Spiritual Distress  Goal: Distress heard, acknowledged, and addressed  Description  Patient/family/caregiver distress will be heard, acknowledged, and addressed throughout hospice care. Outcome: Progressing Towards Goal     Problem: End of Life Process  Goal: Demonstrate understanding of end of life processes  Description  Patient/caregiver will understand end of life processes.   Outcome: Progressing Towards Goal     Problem: Pain  Goal: *Control of Pain  Description  Mishel Balderas will have pain control AEB a numeric score of 2 or less on a scale of 1-10. Outcome: Progressing Towards Goal     Problem: Anticipatory Grief  Goal: Explore Reactions To and Verbalize Acceptance of Impending Loss  Description  Patient/family/caregiver will explore reactions to and verbalize acceptance of impending loss. Outcome: Progressing Towards Goal     Problem: Dying Process  Goal: Increased peace with dying process, patient coping identified, patient/family expressed gratitude, spiritual distress identified and decreased with visit  Outcome: Progressing Towards Goal     Problem: Pshychosocial General  Goal: Patient/family is receiving emotional support  Description  Problem: SW: Patient Standard/ Initial Plan of Care-Social Work    Goals: 1. Patient and family will experience a smooth and safe transition to hospice care   2. Long Term Goal: To minimize pain and discomfort while maximizing quality of life for the \  patient and providing support for the family /patient caregiver. Intervention: 1. Social Work Assessment            2. Assess patient /family/ caregiver expectations from hospice. 3. Assess desires regarding end-of-life issues. 4. Assess degrees and stages of grief. 5. Assessment of pain and coping at every visit. 6. Assess the patient psycho-social, emotional cultural implications of pain. 7. Assess mental/ emotional status and need for additional resources every visit. 8. Assess PCG anxiety and fatigue in care plan delivery.       Outcome: Progressing Towards Goal     Problem: DISCHARGE PLANNING  Goal: *DISCHARGE TO SAFE ENVIRONMENT  Description  Problem:  Discharge Planning      Goal:   Implement a Safe Discharge Plan to the patient or families choosing    Interventions:  Identify Available Caregivers and Support System  Assess / Coordinate for [employer] Needs   Assess / Coordinate DME Needs  Assess / Refer for Informal Supports Need   Arrange Transportation     Outcome: Progressing Towards Goal     Problem: Advance Directive, Legal Document Needs  Goal: Patient will fill out appropriate document(s)  Description  Problem:  Planning for Death    Goals:   home will be chosen / arrangements will be made    Interventions:  Assess desires regarding end of life issues  Instruct and support the patient / family through the dying process  Encourage ventilate of feelings / fears  Assist with  home arrangements  Instruct family / facility / PCG  whom to call at the time of death   Outcome: Progressing Towards Goal

## 2019-10-05 NOTE — PROGRESS NOTES
Problem: Risk for Spread of Infection  Goal: Prevent transmission of infectious organism to others  Description  Prevent the transmission of infectious organisms to other patients, staff members, and visitors. Outcome: Progressing Towards Goal     Problem: Falls - Risk of  Goal: *Absence of Falls  Description  Pt will remain free from falls aeb no injuries while at [facility]. Outcome: Progressing Towards Goal  Note:   Fall Risk Interventions:  Mobility Interventions: Bed/chair exit alarm, Patient to call before getting OOB    Mentation Interventions: Adequate sleep, hydration, pain control, Bed/chair exit alarm, Door open when patient unattended, Reorient patient, Room close to nurse's station    Medication Interventions: Bed/chair exit alarm, Evaluate medications/consider consulting pharmacy, Patient to call before getting OOB    Elimination Interventions: Bed/chair exit alarm, Call light in reach, Patient to call for help with toileting needs    History of Falls Interventions: Bed/chair exit alarm, Door open when patient unattended, Room close to nurse's station         Problem: Pressure Injury - Risk of  Goal: *Prevention of pressure injury  Description  Pt will remain free from/ any further pressure injuries aeb no/progression pressure sores while at [facility].      Outcome: Progressing Towards Goal  Note:   Pressure Injury Interventions:  Sensory Interventions: Assess changes in LOC, Assess need for specialty bed, Check visual cues for pain, Keep linens dry and wrinkle-free, Pressure redistribution bed/mattress (bed type)    Moisture Interventions: Absorbent underpads, Apply protective barrier, creams and emollients, Assess need for specialty bed, Moisture barrier, Maintain skin hydration (lotion/cream)    Activity Interventions: Pressure redistribution bed/mattress(bed type), Assess need for specialty bed    Mobility Interventions: HOB 30 degrees or less, Float heels, Pressure redistribution bed/mattress (bed type)    Nutrition Interventions: Document food/fluid/supplement intake    Friction and Shear Interventions: Apply protective barrier, creams and emollients, HOB 30 degrees or less                Problem: Anxiety/Agitation  Goal: Verbalize and demonstrate ability to manage anxiety  Description  The patient/family/caregiver will verbalize and demonstrate ability to manage the patient's anxiety throughout hospice care. Outcome: Progressing Towards Goal     Problem: Pain  Goal: *Control of Pain  Description  Giovanny Dickens will have pain control AEB a numeric score of 2 or less on a scale of 1-10.   Outcome: Progressing Towards Goal

## 2019-10-05 NOTE — PROGRESS NOTES
The patient report was taken from the off going RN at 0360. The patient was identified by name and . He shows no signs of distress. The bed is low and locked with the side rails up times two and the tab alert in place. The patient was alert and oriented to self and place all of shift. He required two prn doses of medication for pain and for agitation. The pain was in his back and his right knee. The medications resolved the pain. Patient ate all of his meals and snacks and drinks between meals. He call out when he needs help. Patient had one incontinent bowel movement this shift. Reported off to the on coming RN at 4493.

## 2019-10-05 NOTE — PROGRESS NOTES
1850- Received bedside report from Layman Bosworth, RN. Pt identified and noted to be resting comfortably in bed. Pt's eyes are closed and he is noted to be on 7L O2. No S&S of agitation, SOB, nausea, or vomiting. No further needs at this time. No family noted at bedside. Bed low and locked, call light within reach, tab alarm system in place. Door remains open for frequent rounding and observation. 2003- Pt trying to get out of bed, O2 off and pt is hypoxic. O2 put back on. Ativan and morphine given po. Pt denies further needs. 2030- pt resting with eyes closed. No distress noted. 2122- Pt tab alert going off and pt is trying to get out of bed. O2 off again. O2 placed back on pt after getting him back into bed. Scheduled meds given. Roxanol given po for resp distress. Pt denies further needs at this time. Tab alert on.     2255- Tab alert going off. Pt wanting to sleep on the very edge of the bed, which pulls the tab alert off. Pt moved to center of bed and O2 put back on. Roxanol given for resp distress. Tab alert placed on each side of pt with cord stat locked onto arm. 2317- Tab alert going off again and pt has taken O2 off again. Pt is back to laying on the right side of the bed up against the rail. Morphine 2mg given SQ in right arm. Pt tolerated well. O2 placed back on and pt repositioned with a pillow in between him and the rail. 0005- Pt tab alert going off because pt is lying on very edge of bed. After repositioning numerous times with no success, tab alert on left side removed. Pt door open while unattended. Room right in front of nurses station. 0200- Pt is resting quietly on very edge of the bed with pillow in between him and the rail. No distress noted. 7036- Pt beginning to get restless and agitated. Roxanol and ativan given. Denies further needs. 4907- Report given to Layman Bosworth, RN.

## 2019-10-06 NOTE — PROGRESS NOTES
1333: Patient with increased anxiety and agitation, keeps removing oxygen resulting in shortness of breath and more confusion. Scheduled medications administered which included Lorazepam 1mg. Morphine Sulfate 5mg PO also administered at this time to decrease dyspnea and promote comfort. 1402: removed breathing treatment and reapplied oxygen at 7L/min via oxymizer. Patient with regular and even respirations. No further agitation or anxiety observed at this time. Side rails up X 2, call bell within reach Tab alert intact and functioning. Bed in low locked position.

## 2019-10-06 NOTE — PROGRESS NOTES
0719:    Received bedside report from Gabriella Rose RN. Pt identified and noted to be resting comfortably in bed. Pt's eyes are closed and noted with 7 L of oxygen Oximizer. No S&S of agitation, SOB, nausea, or vomiting. No facial grimacing or moaning noted. FLACC score of 0. No further needs at this time. No family noted at bedside. Bed low and locked, call light within reach, tab alarm system in place. Door remains open for frequent rounding and observation. 0830:  Pt is resting comfortably in bed at this time. No S&S of agitation, SOB, or pain. FLACC score of 0. Respirations are even and non labored. No family noted at bedside. 4613:  Pt refused breakfast tray. Pt very confused. Dressing change to RLE completed per order. Pt tolerated fairly well. PRN Roxanol given. Patient took all scheduled medicines whole. Duo Neb given. Bed bath completed. No family noted at bedside. 1002:  Pt is resting comfortably in bed with eyes closed. No S&S of pain, agitation, or SOB. FLACC score of 0. Respirations are even and no labored. No further needs at this time. 1231: Pt is resting comfortably in bed. No S&S of pain, agitation, or SOB. FLACC score of 0. No needs at his time. Pt ate 50% of lunch tray. 1330:  Pt is resting calmly and comfortably in bed with eyes closed. No S&S of distress or discomfort. FLACC score of 0. Patient's sister came to visit. No further needs at this time. 1427:  Pt is resting comfortably in bed. No S&S of distress or discomfort. FLACC score of 0. No S&S of agitation or SOB. Scheduled Duo Neb and Ativan given. 1603:  Pt is resting comfortably in bed with eyes closed. No S&S of pain, agitation, or SOB. Respirations are even and non labored. FLACC score of 0. No further needs at this time. 1738:  Pt is resting quietly in bed. No S&S of pain, agitation, or SOB. FLACC score of 0. Respirations are even and non labored.   No further needs a this time. 1850:  Report given to Todd Zamudio.  Pt resting comfortably in bed.  No family noted at bedside. No further needs at this time.  Bed low and locked, call light within reach, tab alarm in place. Door remains open for frequent monitoring and observations.

## 2019-10-06 NOTE — PROGRESS NOTES
Problem: Risk for Spread of Infection  Goal: Prevent transmission of infectious organism to others  Description  Prevent the transmission of infectious organisms to other patients, staff members, and visitors. Outcome: Progressing Towards Goal     Problem: Falls - Risk of  Goal: *Absence of Falls  Description  Pt will remain free from falls aeb no injuries while at [facility]. Outcome: Progressing Towards Goal  Note:   Fall Risk Interventions:  Mobility Interventions: Bed/chair exit alarm    Mentation Interventions: Adequate sleep, hydration, pain control, Bed/chair exit alarm, Door open when patient unattended, Evaluate medications/consider consulting pharmacy    Medication Interventions: Bed/chair exit alarm, Evaluate medications/consider consulting pharmacy    Elimination Interventions: Bed/chair exit alarm, Call light in reach    History of Falls Interventions: Bed/chair exit alarm, Door open when patient unattended, Evaluate medications/consider consulting pharmacy         Problem: Pressure Injury - Risk of  Goal: *Prevention of pressure injury  Description  Pt will remain free from/ any further pressure injuries aeb no/progression pressure sores while at [facility].      Outcome: Progressing Towards Goal  Note:   Pressure Injury Interventions:  Sensory Interventions: Assess changes in LOC, Assess need for specialty bed, Avoid rigorous massage over bony prominences, Keep linens dry and wrinkle-free, Maintain/enhance activity level, Monitor skin under medical devices, Pressure redistribution bed/mattress (bed type), Pad between skin to skin, Minimize linen layers, Check visual cues for pain    Moisture Interventions: Absorbent underpads, Apply protective barrier, creams and emollients, Internal/External urinary devices, Contain wound drainage, Maintain skin hydration (lotion/cream), Moisture barrier    Activity Interventions: Pressure redistribution bed/mattress(bed type)    Mobility Interventions: Pressure redistribution bed/mattress (bed type), HOB 30 degrees or less    Nutrition Interventions: Document food/fluid/supplement intake    Friction and Shear Interventions: Apply protective barrier, creams and emollients, Feet elevated on foot rest, Foam dressings/transparent film/skin sealants, HOB 30 degrees or less, Lift team/patient mobility team, Minimize layers                Problem: Anticipatory Grief  Goal: Explore reactions to and verbalize acceptance of impending loss  Description  Patient/family/caregiver will explore reactions to and verbalize acceptance of impending loss. Outcome: Progressing Towards Goal     Problem: Anxiety/Agitation  Goal: Verbalize and demonstrate ability to manage anxiety  Description  The patient/family/caregiver will verbalize and demonstrate ability to manage the patient's anxiety throughout hospice care. Outcome: Progressing Towards Goal     Problem: Coping and Emotional Distress  Goal: Demonstrate acceptance of terminal illness and understanding of disease progression  Description  Patient/family/caregiver will demonstrate acceptance of terminal disease and understanding of disease progression while employing appropriate coping mechanisms. Outcome: Progressing Towards Goal     Problem: Spiritual Distress  Goal: Distress heard, acknowledged, and addressed  Description  Patient/family/caregiver distress will be heard, acknowledged, and addressed throughout hospice care. Outcome: Progressing Towards Goal     Problem: End of Life Process  Goal: Demonstrate understanding of end of life processes  Description  Patient/caregiver will understand end of life processes. Outcome: Progressing Towards Goal     Problem: Pain  Goal: *Control of Pain  Description  Magalis Thorne will have pain control AEB a numeric score of 2 or less on a scale of 1-10.   Outcome: Progressing Towards Goal     Problem: Anticipatory Grief  Goal: Explore Reactions To and Verbalize Acceptance of Impending Loss  Description  Patient/family/caregiver will explore reactions to and verbalize acceptance of impending loss. Outcome: Progressing Towards Goal     Problem: Dying Process  Goal: Increased peace with dying process, patient coping identified, patient/family expressed gratitude, spiritual distress identified and decreased with visit  Outcome: Progressing Towards Goal     Problem: Pshychosocial General  Goal: Patient/family is receiving emotional support  Description  Problem: SW: Patient Standard/ Initial Plan of Care-Social Work    Goals: 1. Patient and family will experience a smooth and safe transition to hospice care   2. Long Term Goal: To minimize pain and discomfort while maximizing quality of life for the \  patient and providing support for the family /patient caregiver. Intervention: 1. Social Work Assessment            2. Assess patient /family/ caregiver expectations from hospice. 3. Assess desires regarding end-of-life issues. 4. Assess degrees and stages of grief. 5. Assessment of pain and coping at every visit. 6. Assess the patient psycho-social, emotional cultural implications of pain. 7. Assess mental/ emotional status and need for additional resources every visit. 8. Assess PCG anxiety and fatigue in care plan delivery.       Outcome: Progressing Towards Goal     Problem: DISCHARGE PLANNING  Goal: *DISCHARGE TO SAFE ENVIRONMENT  Description  Problem:  Discharge Planning      Goal:   Implement a Safe Discharge Plan to the patient or families choosing    Interventions:  Identify Available Caregivers and Support System  Assess / Coordinate for [employer] Resource Needs   Assess / Coordinate DME Needs  Assess / Refer for Informal Supports Need   Arrange Transportation     Outcome: Progressing Towards Goal     Problem: Advance Directive, Legal Document Needs  Goal: Patient will fill out appropriate document(s)  Description  Problem:  Planning for Death    Goals:   home will be chosen / arrangements will be made    Interventions:  Assess desires regarding end of life issues  Instruct and support the patient / family through the dying process  Encourage ventilate of feelings / fears  Assist with  home arrangements  Instruct family / facility / PCG  whom to call at the time of death   Outcome: Progressing Towards Goal

## 2019-10-06 NOTE — PROGRESS NOTES
7621:  Received bedside report from Trini Martinez RN.  Pt identified and noted to be resting comfortably in bed.  Pt's eyes are closed and noted with 7 L of oxygen Oximizer. No S&S of agitation, SOB, nausea, or vomiting.  No facial grimacing or moaning noted. FLACC score of 0.  No further needs at this time.  No family noted at bedside.  Bed low and locked, call light within reach, tab alarm system in place.  Door remains open for frequent rounding and observation. 6857: Patient's tab alarm sounded. Pt noted to be turning over in bed which called alarm to sound. Oxygen out of patient's nose which was placed back on by RN. Assisted patient with repositioning. 6239:  Pt note to bed very SOB and wheezing. Respirations are labored with use of abdominal muscles. Pt restless secondary to SOB. Pt squirming in bed and pulling off covers and clothes. PRN Roxanol given for SOB. PO Ativan given and scheduled DUO NEB given. Pt denies pain at this time. Scheduled medicines given. Bed bath started. 8587:  Assessment complete. Pt noted to be A&O x 1-2 and very confused but pleasant. Pt breathing is even and non labored at this time. Lungs sounds noted to be diminished with wheezing. Pt has hypoactive bowel sounds. Pt has Castillo catheter draining below the bladder. Pt has poor appetite and will eat 25-45% of each meal.  Pt noted to have wounds to right knee and left posterior thigh. Wound care completed per orders. Pt takes pills whole. Pt complete assist with all ADL's. Dressing to right knee is clean, dry, and intact. Pt tolerated well, minimal c/o pain. Pt declined breakfast tray. 1023:  Pt is resting in bed with eyes closed. No S&S of pain, SOB, or agitation. Respirations are even and non labored. FLACC score of 0. No needs at this time. No family at bedside. 1223:  Pt is resting quietly in bed and still eating lunch. No c/o pain at this time. No S&S of SOB or agitation.   FLACC score of 0. No further needs at this time. 1333:  Pt noted to have increase anxiety and agitation. Pt SOB due to frequent removal of oxygen by patient. Pt confused and tab alarm sounded. Scheduled Ativan and DuoNeb treatment given. PRN Roxanol given or SOB. See previous note by Linnette Musa RN.      1426:  Pt noted to be resting comfortably in bed. No S&S of pain, agitation, or SOB. Respirations are even and non labored. FLACC score of 0. No family noted at bedside. 1618:  Pt is resting comfortably in bed with eyes close. No S&S of pain, agitation, or SOB. Respirations are even and non labored. FLACC score of 0. No further needs at this time. No family noted at bedside. 1816:  Pt is resting quietly and calmly in bed with eyes closed. No S&S of agitation, pain, or SOB. Pt took off oxygen. Oxygen put back on by RN. No further needs at this time. No family noted at bedside. 1900:  Report given to Gena Florez RN.  Pt resting comfortably in bed.  No family noted at bedside.  No further needs at this time.  Bed low and locked, call light within reach, tab alarm in place.  Door remains open for frequent monitoring and observations.

## 2019-10-07 NOTE — PROGRESS NOTES
Received report from off going nurse. 1950 Patient is lying quietly in bed with eyes open. Breathing unlabored. Oximyzer @ 7Lpm on. Answer some questions appropriately. Mild confusion. Dressing to R knee clean, dry and intact. Denies any pain or discomfort at this time. 2030 Patient identified using name and date of birth. Administered medication per orders. Medicated prn for pain to the knee. 0035 Resting quietly in bed with eyes closed. 6469 Patient awake and restless. Has removed O2 and confusion is increased. Attempts to get out of bed. Request for medication to help with breathing. Medication administered per orders. Lorazepam prn for anxiety and morphine prn for dyspnea. 1950 Alert. Restless. Medication administered per orders. Breathing labored, patient had removed O2. O2 placed back om patient. Repositioned in bed. Report given to oncoming nurse.

## 2019-10-07 NOTE — DISCHARGE INSTRUCTIONS
Activity: Activity as tolerated with assist  Diet: Diet as tolerate  Wound Care: Castillo catheter care, inserted 9/18/19  Wound care to right knee wound: Clean area with wound cleanser. Apply wet to moist gauze and cover with abd pad. Secure with kerlix wrap and tape. Change twice daily and as needed.    Oxygen: 7L/min via oxymizer  Equipment: Equipment as previously ordered in the home.

## 2019-10-07 NOTE — PROGRESS NOTES
Problem: Risk for Spread of Infection  Goal: Prevent transmission of infectious organism to others  Description  Prevent the transmission of infectious organisms to other patients, staff members, and visitors. Outcome: Progressing Towards Goal    Staff will continue to use protective wear when caring for [facility]. [facility] will received antibiotic therapy per medication orders. Problem: Falls - Risk of  Goal: *Absence of Falls  Description  Pt will remain free from falls aeb no injuries while at [facility]. Outcome: Progressing Towards Goal  Note:   Fall Risk Interventions:  Mobility Interventions: Bed/chair exit alarm    Mentation Interventions: Adequate sleep, hydration, pain control    Medication Interventions: Bed/chair exit alarm    Elimination Interventions: Bed/chair exit alarm    History of Falls Interventions: Bed/chair exit alarm    [facility] will be free of falls this shift.

## 2019-10-07 NOTE — DISCHARGE SUMMARY
Discharge Summary     Patient: Ean Gutierrez MRN: [id number]  SSN: [id number]    YOB: 1950  Age: 68 y.o. Sex: male       Admit Date: 9/30/2019    Discharge Date: 10/7/2019      Admission Diagnoses: Hypercapnic respiratory failure [facility]) [J96.92]    Discharge Diagnoses:   Problem List as of 10/7/2019 Date Reviewed: 9/30/2019          Codes Class Noted - Resolved    * (Principal) Hypercapnic respiratory failure ([facility] ICD-10-CM: J96.92  ICD-9-CM: 518.81  9/30/2019 - Present        Cellulitis of right leg ICD-10-CM: L03.115  ICD-9-CM: 682.6  9/25/2019 - Present        Restless legs syndrome (RLS) ICD-10-CM: G25.81  ICD-9-CM: 333.94  6/23/2019 - Present        Neurogenic bladder disorder ICD-10-CM: N31.9  ICD-9-CM: 596.54  6/23/2019 - Present        Benzodiazepine dependence ([facility] ICD-10-CM: F13.20  ICD-9-CM: 304.10  6/23/2019 - Present        BPH (benign prostatic hyperplasia) ICD-10-CM: N40.0  ICD-9-CM: 600.00  6/19/2019 - Present        Depression ICD-10-CM: F32.9  ICD-9-CM: 197  6/19/2019 - Present        H/O blood clots ICD-10-CM: Z86.718  ICD-9-CM: V12.51  6/19/2019 - Present        History of pneumothorax ICD-10-CM: Z87.09  ICD-9-CM: V12.69  6/19/2019 - Present    Overview Signed 6/19/2019 11:31 AM by Kel Kan NP     Last Assessment & Plan:   History of pneumothorax with lobectomy in the past on the right side             Debility ICD-10-CM: R53.81  ICD-9-CM: 799.3  1/16/2019 - Present    Overview Signed 6/19/2019 11:31 AM by Kel Kan NP     Last Assessment & Plan:   Supportive care, getting home care, monitor.              Primary insomnia ICD-10-CM: F51.01  ICD-9-CM: 307.42  12/21/2018 - Present    Overview Signed 6/19/2019 11:31 AM by Kel Kan NP     Last Assessment & Plan:   Increase Trazodone to 100 mg q hs             Benign hypertension ICD-10-CM: I10  ICD-9-CM: 401.1  11/16/2018 - Present    Overview Signed 6/19/2019 11:31 AM by Kel Kan NP     Last Assessment & Plan:   Stable on Norvasc             COPD (chronic obstructive pulmonary disease) ([facility] ICD-10-CM: J44.9  ICD-9-CM: 496  11/16/2018 - Present    Overview Signed 6/19/2019 11:31 AM by Jodell Cowden, NP     Last Assessment & Plan:   Severe. Patient currently not taking his Spiriva. Cost is an issue. We will change to Trelegy. Purpose and proper use of inhaler was shown with good return demonstration. Continue with theophylline although patient says he does not think it helps             Post-polio syndrome ICD-10-CM: G14  ICD-9-CM: 138  11/16/2018 - Present    Overview Signed 6/19/2019 11:31 AM by Jodell Cowden, NP     Last Assessment & Plan:   Supportive care                    Discharge Condition: BABAK Arevalo 78 Course: Discharge from routine stay at [facility] to home with [facility]. DC 10/7/19 at 0930  via ambulance. Status at time of discharge is routine. Consults: None    Significant Diagnostic Studies: None    Disposition: home    Discharge Medications:   Current Discharge Medication List      START taking these medications    Details   LORazepam (ATIVAN) 1 mg tablet Take 1 Tab by mouth every four (4) hours as needed for Anxiety. Max Daily Amount: 6 mg. Qty: 30 Tab, Refills: 0    Associated Diagnoses: Benzodiazepine dependence (HCC)      morphine (ROXANOL) 100 mg/5 mL (20 mg/mL) concentrated solution Take 0.5 mL by mouth every two (2) hours as needed for Pain (shortness of breath) for up to 5 days. Max Daily Amount: 120 mg.  Qty: 30 mL, Refills: 0    Associated Diagnoses: Panlobular emphysema ([facility]         CONTINUE these medications which have CHANGED    Details   !! albuterol-ipratropium (DUO-NEB) 2.5 mg-0.5 mg/3 ml nebu 3 mL by Nebulization route every four (4) hours as needed for Other (FOR SOB OR WHEEZING) for up to 30 days.   Qty: 30 Nebule, Refills: 0      !! albuterol-ipratropium (DUO-NEB) 2.5 mg-0.5 mg/3 ml nebu 3 mL by Nebulization route every six (6) hours.  Qty: 30 Nebule, Refills: 0      predniSONE (DELTASONE) 10 mg tablet Take 10 mg by mouth daily. Indications: Dyspnea  Qty: 5 Tab, Refills: 0       !! - Potential duplicate medications found. Please discuss with provider. CONTINUE these medications which have NOT CHANGED    Details   guaiFENesin-dextromethorphan (ROBITUSSIN DM) 100-10 mg/5 mL syrup Take 10 mL by mouth every four (4) hours as needed for Cough for up to 10 days. Qty: 1 Bottle, Refills: 0      Saccharomyces boulardii (FLORASTOR) 250 mg capsule Take 2 Caps by mouth daily for 7 days. Qty: 14 Cap, Refills: 0      raNITIdine (ZANTAC) 150 mg tablet Take 150 mg by mouth daily. Indications: heartburn      pramipexole (MIRAPEX) 1 mg tablet Take 1 mg by mouth nightly. clonazePAM (KLONOPIN) 1 mg tablet Take 1 mg by mouth three (3) times daily. Pt takes at 0900, 1500, 2100  Indications: Anxiety, Agitation      traZODone (DESYREL) 150 mg tablet Take 150 mg by mouth nightly. [employer]clara sends a 150mg tablet      acetaminophen (TYLENOL) 325 mg tablet Take 650 mg by mouth every four to six (4-6) hours as needed. sertraline (ZOLOFT) 100 mg tablet Take 100 mg by mouth daily. fluticasone propion-salmeterol (ADVAIR/WIXELA) 250-50 mcg/dose diskus inhaler Take 1 Puff by inhalation two (2) times a day. ibuprofen (MOTRIN) 200 mg tablet Take 200 mg by mouth every four (4) hours as needed for Pain. senna (SENNA) 8.6 mg tablet Take 1 Tab by mouth daily. STOP taking these medications       OXYGEN-AIR DELIVERY SYSTEMS Comments:   Reason for Stopping:               Activity: Activity as tolerated with assist  Diet: Diet as tolerate  Wound Care: Castillo catheter care, inserted 9/18/19  Wound care to right knee wound: Clean area with wound cleanser. Apply wet to moist gauze and cover with abd pad. Secure with kerlix wrap and tape. Change twice daily and as needed.    Oxygen: 7L/min via oxymizer  Equipment: Equipment as previously ordered in the home.      Follow-up Appointments   Procedures    FOLLOW UP VISIT Appointment in: Other (Specify) Per protocol     Per protocol     Standing Status:   Standing     Number of Occurrences:   1     Order Specific Question:   Appointment in     Answer:    Other (Specify)       Signed By: Alla Spaulding NP     October 7, 2019

## 2019-10-17 ENCOUNTER — HOME CARE VISIT (OUTPATIENT)
Dept: HOSPICE | Facility: HOSPICE | Age: 69
End: 2019-10-17
Payer: MEDICARE

## 2024-03-25 NOTE — PROGRESS NOTES
Problem: Risk for Spread of Infection  Goal: Prevent transmission of infectious organism to others  Description  Prevent the transmission of infectious organisms to other patients, staff members, and visitors. Outcome: Progressing Towards Goal     Problem: Patient Education:  Go to Education Activity  Goal: Patient/Family Education  Outcome: Progressing Towards Goal     Problem: Falls - Risk of  Goal: *Absence of Falls  Description  Document Selina Shoemakerer Fall Risk and appropriate interventions in the flowsheet. Outcome: Progressing Towards Goal  Note:   Fall Risk Interventions:  Mobility Interventions: Bed/chair exit alarm    Mentation Interventions: Bed/chair exit alarm, Adequate sleep, hydration, pain control, Evaluate medications/consider consulting pharmacy, Door open when patient unattended, Eyeglasses and hearing aids, Room close to nurse's station, Toileting rounds, More frequent rounding, Familiar objects from home    Medication Interventions: Bed/chair exit alarm, Evaluate medications/consider consulting pharmacy    Elimination Interventions: Bed/chair exit alarm, Call light in reach, Toileting schedule/hourly rounds, Patient to call for help with toileting needs    History of Falls Interventions: Bed/chair exit alarm, Room close to nurse's station, Door open when patient unattended         Problem: Pressure Injury - Risk of  Goal: *Prevention of pressure injury  Description  Document Panchito Scale and appropriate interventions in the flowsheet.   Outcome: Progressing Towards Goal  Note:   Pressure Injury Interventions:  Sensory Interventions: Assess changes in LOC, Check visual cues for pain, Float heels, Assess need for specialty bed    Moisture Interventions: Absorbent underpads, Moisture barrier, Maintain skin hydration (lotion/cream), Apply protective barrier, creams and emollients, Limit adult briefs, Assess need for specialty bed    Activity Interventions: Assess need for specialty bed    Mobility Interventions: Assess need for specialty bed, Float heels, HOB 30 degrees or less    Nutrition Interventions: Document food/fluid/supplement intake, Offer support with meals,snacks and hydration    Friction and Shear Interventions: Apply protective barrier, creams and emollients, Foam dressings/transparent film/skin sealants, Lift sheet, Minimize layers, Lift team/patient mobility team                Problem: Hospice Orientation  Goal: Demonstrate understanding of hospice philosophy, plan of care, and home hospice program  Description  The patient/family/caregiver will demonstrate understanding of hospice philosophy, plan of care and the home hospice program as evidenced by participation in meeting the patient's psychosocial, spiritual, medical, and physical needs inclusive of medical supplies/equipment focusing on symptoms. Outcome: Progressing Towards Goal     Problem: Potential for Skin Breakdown  Goal: Demonstrate ability to care for skin, monitor areas of breakdown and demonstrate methods to prevent breakdown  Description  Patient/family/caregiver will demonstrate ability to care for patient's skin, monitor for areas of breakdown, and demonstrate methods to prevent breakdown during hospice care. Outcome: Progressing Towards Goal     Problem: Comfort Deficit  Goal: Reduce/control pain  Description  Patient will report that pain has been reduced or controlled through verbal and nonverbal means and that measures to promote comfort are effective. Outcome: Progressing Towards Goal     Problem: Anticipatory Grief  Goal: Explore reactions to and verbalize acceptance of impending loss  Description  Patient/family/caregiver will explore reactions to and verbalize acceptance of impending loss.   Outcome: Progressing Towards Goal     Problem: Anxiety/Agitation  Goal: Verbalize and demonstrate ability to manage anxiety  Description  The patient/family/caregiver will verbalize and demonstrate ability to manage the patient's anxiety throughout hospice care. Outcome: Progressing Towards Goal     Problem: Communication Deficit  Goal: Effectively communicate symptoms, needs, and concerns  Description  Patient/family/caregiver will effectively communicate symptoms, needs and concerns. Outcome: Progressing Towards Goal     Problem: Coping and Emotional Distress  Goal: Demonstrate acceptance of terminal illness and understanding of disease progression  Description  Patient/family/caregiver will demonstrate acceptance of terminal disease and understanding of disease progression while employing appropriate coping mechanisms. Outcome: Progressing Towards Goal     Problem: Depression  Goal: Verbalize and demonstrate ability to manage depression  Description  The patient/family/caregiver will verbalize and demonstrate ability to manage patient's depression throughout hospice care. Outcome: Progressing Towards Goal     Problem: Spiritual Distress  Goal: Distress heard, acknowledged, and addressed  Description  Patient/family/caregiver distress will be heard, acknowledged, and addressed throughout hospice care. Outcome: Progressing Towards Goal     Problem: End of Life Process  Goal: Demonstrate understanding of end of life processes  Description  Patient/caregiver will understand end of life processes. Outcome: Progressing Towards Goal     Problem: Pain  Goal: *Control of Pain  Description  Oswald Foote will have pain control AEB a numeric score of 2 or less on a scale of 1-10.   Outcome: Progressing Towards Goal No